# Patient Record
Sex: FEMALE | Race: WHITE | Employment: PART TIME
[De-identification: names, ages, dates, MRNs, and addresses within clinical notes are randomized per-mention and may not be internally consistent; named-entity substitution may affect disease eponyms.]

---

## 2017-01-09 ENCOUNTER — OFFICE VISIT (OUTPATIENT)
Dept: FAMILY MEDICINE CLINIC | Facility: CLINIC | Age: 62
End: 2017-01-09

## 2017-01-09 VITALS
TEMPERATURE: 98.1 F | HEART RATE: 97 BPM | WEIGHT: 261.25 LBS | BODY MASS INDEX: 39.59 KG/M2 | SYSTOLIC BLOOD PRESSURE: 156 MMHG | HEIGHT: 68 IN | DIASTOLIC BLOOD PRESSURE: 93 MMHG | OXYGEN SATURATION: 98 %

## 2017-01-09 DIAGNOSIS — E78.2 MIXED HYPERLIPIDEMIA: Primary | ICD-10-CM

## 2017-01-09 DIAGNOSIS — I10 ESSENTIAL HYPERTENSION: ICD-10-CM

## 2017-01-09 DIAGNOSIS — Z53.20 MEDICATION THERAPY MANAGEMENT RECOMMENDATION REFUSED BY PATIENT: ICD-10-CM

## 2017-01-09 PROCEDURE — 99214 OFFICE O/P EST MOD 30 MIN: CPT | Performed by: NURSE PRACTITIONER

## 2017-01-09 RX ORDER — SPIRONOLACTONE AND HYDROCHLOROTHIAZIDE 25; 25 MG/1; MG/1
1 TABLET ORAL DAILY
Qty: 30 TABLET | Refills: 5 | Status: SHIPPED | OUTPATIENT
Start: 2017-01-09 | End: 2017-07-10 | Stop reason: SDUPTHER

## 2017-01-09 ASSESSMENT — ENCOUNTER SYMPTOMS
DIARRHEA: 0
BLOOD IN STOOL: 0
COUGH: 0
EYE DISCHARGE: 0
SINUS PRESSURE: 0
CHEST TIGHTNESS: 0
RHINORRHEA: 0
ABDOMINAL PAIN: 0
CONSTIPATION: 0
SHORTNESS OF BREATH: 0

## 2017-01-09 ASSESSMENT — PATIENT HEALTH QUESTIONNAIRE - PHQ9
1. LITTLE INTEREST OR PLEASURE IN DOING THINGS: 0
SUM OF ALL RESPONSES TO PHQ9 QUESTIONS 1 & 2: 0
SUM OF ALL RESPONSES TO PHQ QUESTIONS 1-9: 0
2. FEELING DOWN, DEPRESSED OR HOPELESS: 0

## 2017-04-03 ENCOUNTER — OFFICE VISIT (OUTPATIENT)
Dept: FAMILY MEDICINE CLINIC | Age: 62
End: 2017-04-03

## 2017-04-03 VITALS
DIASTOLIC BLOOD PRESSURE: 78 MMHG | RESPIRATION RATE: 20 BRPM | BODY MASS INDEX: 39.56 KG/M2 | HEART RATE: 114 BPM | WEIGHT: 261 LBS | TEMPERATURE: 97.9 F | HEIGHT: 68 IN | OXYGEN SATURATION: 95 % | SYSTOLIC BLOOD PRESSURE: 140 MMHG

## 2017-04-03 DIAGNOSIS — J40 BRONCHITIS: Primary | ICD-10-CM

## 2017-04-03 PROCEDURE — 99214 OFFICE O/P EST MOD 30 MIN: CPT | Performed by: NURSE PRACTITIONER

## 2017-04-03 RX ORDER — PREDNISONE 20 MG/1
20 TABLET ORAL 2 TIMES DAILY
Qty: 10 TABLET | Refills: 0 | Status: SHIPPED | OUTPATIENT
Start: 2017-04-03 | End: 2017-04-08

## 2017-04-03 RX ORDER — AZITHROMYCIN 250 MG/1
TABLET, FILM COATED ORAL
Qty: 1 PACKET | Refills: 0 | Status: SHIPPED | OUTPATIENT
Start: 2017-04-03 | End: 2017-07-10 | Stop reason: ALTCHOICE

## 2017-04-03 ASSESSMENT — ENCOUNTER SYMPTOMS
EYE DISCHARGE: 0
SINUS PRESSURE: 0
BLOOD IN STOOL: 0
CONSTIPATION: 0
RHINORRHEA: 0
SHORTNESS OF BREATH: 0
CHEST TIGHTNESS: 0
ABDOMINAL PAIN: 0
COUGH: 1
DIARRHEA: 0

## 2017-06-12 DIAGNOSIS — M54.41 ACUTE BILATERAL LOW BACK PAIN WITH BILATERAL SCIATICA: ICD-10-CM

## 2017-06-12 DIAGNOSIS — M54.42 ACUTE BILATERAL LOW BACK PAIN WITH BILATERAL SCIATICA: ICD-10-CM

## 2017-06-13 RX ORDER — TIZANIDINE 4 MG/1
TABLET ORAL
Qty: 180 TABLET | Refills: 1 | Status: SHIPPED | OUTPATIENT
Start: 2017-06-13 | End: 2017-07-10 | Stop reason: SDUPTHER

## 2017-07-10 ENCOUNTER — OFFICE VISIT (OUTPATIENT)
Dept: FAMILY MEDICINE CLINIC | Age: 62
End: 2017-07-10

## 2017-07-10 VITALS
WEIGHT: 262 LBS | SYSTOLIC BLOOD PRESSURE: 152 MMHG | DIASTOLIC BLOOD PRESSURE: 86 MMHG | HEART RATE: 88 BPM | RESPIRATION RATE: 18 BRPM | HEIGHT: 68 IN | OXYGEN SATURATION: 97 % | TEMPERATURE: 97.4 F | BODY MASS INDEX: 39.71 KG/M2

## 2017-07-10 DIAGNOSIS — Z76.0 MEDICATION REFILL: ICD-10-CM

## 2017-07-10 DIAGNOSIS — M54.42 ACUTE BILATERAL LOW BACK PAIN WITH BILATERAL SCIATICA: ICD-10-CM

## 2017-07-10 DIAGNOSIS — M54.41 ACUTE BILATERAL LOW BACK PAIN WITH BILATERAL SCIATICA: ICD-10-CM

## 2017-07-10 DIAGNOSIS — I10 ESSENTIAL HYPERTENSION: Primary | ICD-10-CM

## 2017-07-10 DIAGNOSIS — Z53.20 MEDICATION THERAPY MANAGEMENT RECOMMENDATION REFUSED BY PATIENT: ICD-10-CM

## 2017-07-10 PROCEDURE — 99213 OFFICE O/P EST LOW 20 MIN: CPT | Performed by: NURSE PRACTITIONER

## 2017-07-10 RX ORDER — TIZANIDINE 4 MG/1
TABLET ORAL
Qty: 60 TABLET | Refills: 5 | Status: SHIPPED | OUTPATIENT
Start: 2017-07-10 | End: 2018-01-16

## 2017-07-10 RX ORDER — DICYCLOMINE HYDROCHLORIDE 10 MG/1
10 CAPSULE ORAL DAILY PRN
Qty: 60 CAPSULE | Refills: 5 | Status: SHIPPED | OUTPATIENT
Start: 2017-07-10 | End: 2018-02-22 | Stop reason: SDUPTHER

## 2017-07-10 RX ORDER — DICYCLOMINE HYDROCHLORIDE 10 MG/1
10 CAPSULE ORAL 2 TIMES DAILY PRN
COMMUNITY
End: 2017-07-10 | Stop reason: SDUPTHER

## 2017-07-10 RX ORDER — SPIRONOLACTONE AND HYDROCHLOROTHIAZIDE 25; 25 MG/1; MG/1
1 TABLET ORAL DAILY
Qty: 30 TABLET | Refills: 5 | Status: SHIPPED | OUTPATIENT
Start: 2017-07-10 | End: 2018-01-13 | Stop reason: SDUPTHER

## 2017-07-10 ASSESSMENT — ENCOUNTER SYMPTOMS
SHORTNESS OF BREATH: 0
BACK PAIN: 1

## 2018-01-13 DIAGNOSIS — I10 ESSENTIAL HYPERTENSION: ICD-10-CM

## 2018-01-13 DIAGNOSIS — M54.42 ACUTE BILATERAL LOW BACK PAIN WITH BILATERAL SCIATICA: ICD-10-CM

## 2018-01-13 DIAGNOSIS — M54.41 ACUTE BILATERAL LOW BACK PAIN WITH BILATERAL SCIATICA: ICD-10-CM

## 2018-01-16 RX ORDER — SPIRONOLACTONE AND HYDROCHLOROTHIAZIDE 25; 25 MG/1; MG/1
TABLET ORAL
Qty: 30 TABLET | Refills: 5 | Status: SHIPPED | OUTPATIENT
Start: 2018-01-16 | End: 2018-07-20 | Stop reason: SDUPTHER

## 2018-01-16 RX ORDER — TIZANIDINE 4 MG/1
TABLET ORAL
Qty: 180 TABLET | Refills: 1 | Status: SHIPPED | OUTPATIENT
Start: 2018-01-16 | End: 2018-02-22 | Stop reason: SDUPTHER

## 2018-02-22 ENCOUNTER — OFFICE VISIT (OUTPATIENT)
Dept: FAMILY MEDICINE CLINIC | Age: 63
End: 2018-02-22

## 2018-02-22 VITALS
BODY MASS INDEX: 38.49 KG/M2 | SYSTOLIC BLOOD PRESSURE: 138 MMHG | HEIGHT: 68 IN | DIASTOLIC BLOOD PRESSURE: 80 MMHG | HEART RATE: 89 BPM | RESPIRATION RATE: 20 BRPM | TEMPERATURE: 97.2 F | OXYGEN SATURATION: 99 % | WEIGHT: 254 LBS

## 2018-02-22 DIAGNOSIS — I10 ESSENTIAL HYPERTENSION: Primary | ICD-10-CM

## 2018-02-22 DIAGNOSIS — M54.42 ACUTE BILATERAL LOW BACK PAIN WITH BILATERAL SCIATICA: ICD-10-CM

## 2018-02-22 DIAGNOSIS — M54.41 ACUTE BILATERAL LOW BACK PAIN WITH BILATERAL SCIATICA: ICD-10-CM

## 2018-02-22 DIAGNOSIS — Z76.0 MEDICATION REFILL: ICD-10-CM

## 2018-02-22 PROCEDURE — 99213 OFFICE O/P EST LOW 20 MIN: CPT | Performed by: NURSE PRACTITIONER

## 2018-02-22 RX ORDER — TIZANIDINE 4 MG/1
4 TABLET ORAL EVERY 8 HOURS PRN
Qty: 180 TABLET | Refills: 1 | Status: SHIPPED | OUTPATIENT
Start: 2018-02-22 | End: 2018-08-22 | Stop reason: SDUPTHER

## 2018-02-22 RX ORDER — DICYCLOMINE HYDROCHLORIDE 10 MG/1
10 CAPSULE ORAL DAILY PRN
Qty: 60 CAPSULE | Refills: 5 | Status: SHIPPED | OUTPATIENT
Start: 2018-02-22 | End: 2018-08-22 | Stop reason: SDUPTHER

## 2018-02-22 ASSESSMENT — PATIENT HEALTH QUESTIONNAIRE - PHQ9
SUM OF ALL RESPONSES TO PHQ9 QUESTIONS 1 & 2: 0
SUM OF ALL RESPONSES TO PHQ QUESTIONS 1-9: 0
2. FEELING DOWN, DEPRESSED OR HOPELESS: 0
1. LITTLE INTEREST OR PLEASURE IN DOING THINGS: 0

## 2018-02-22 ASSESSMENT — ENCOUNTER SYMPTOMS
BACK PAIN: 1
SHORTNESS OF BREATH: 0
COUGH: 0

## 2018-02-22 NOTE — PROGRESS NOTES
Past Medical History:   Diagnosis Date    Diverticula, colon     Hyperlipidemia     Hypertension       Past Surgical History:   Procedure Laterality Date    CHOLECYSTECTOMY, OPEN  12    HIP SURGERY Right 2007    HYSTERECTOMY      JOINT REPLACEMENT Bilateral 2009    LAMINECTOMY  2013     Family History   Problem Relation Age of Onset    Diabetes Mother     Heart Failure Mother     Diabetes Father     Kidney Disease Father      Social History   Substance Use Topics    Smoking status: Never Smoker    Smokeless tobacco: Never Used    Alcohol use No      Allergies   Allergen Reactions    Morphine Other (See Comments)     Not sure of reaction just told in the hospital never to take it again       Subjective:      Review of Systems   Constitutional: Negative for chills and fever. Respiratory: Negative for cough and shortness of breath. Cardiovascular: Positive for leg swelling (minimal in feet at the end of the day, resolves by am). Negative for chest pain and palpitations. Musculoskeletal: Positive for back pain. Other pertinent ROS in HPI  Objective:     /80   Pulse 89   Temp 97.2 °F (36.2 °C) (Oral)   Resp 20   Ht 5' 8.11\" (1.73 m)   Wt 254 lb (115.2 kg)   SpO2 99%   BMI 38.50 kg/m²    Physical Exam   Constitutional: She is oriented to person, place, and time. She appears well-developed and well-nourished. No distress. HENT:   Head: Normocephalic and atraumatic. Right Ear: External ear normal.   Left Ear: External ear normal.   Nose: Nose normal.   Mouth/Throat: Oropharynx is clear and moist.   Eyes: Conjunctivae and EOM are normal. Pupils are equal, round, and reactive to light. Neck: Trachea normal, normal range of motion and full passive range of motion without pain. No thyroid mass present. Cardiovascular: Normal rate, regular rhythm, S1 normal, S2 normal and normal heart sounds. Exam reveals no distant heart sounds and no friction rub.     Pulmonary/Chest:

## 2018-07-20 DIAGNOSIS — I10 ESSENTIAL HYPERTENSION: ICD-10-CM

## 2018-07-23 RX ORDER — SPIRONOLACTONE AND HYDROCHLOROTHIAZIDE 25; 25 MG/1; MG/1
TABLET ORAL
Qty: 30 TABLET | Refills: 5 | Status: SHIPPED | OUTPATIENT
Start: 2018-07-23 | End: 2019-01-07 | Stop reason: SDUPTHER

## 2018-07-23 NOTE — TELEPHONE ENCOUNTER
Last seen 2/22/18  Next appt 8/22/18    Next Visit Date:  Future Appointments  Date Time Provider Per Mora   8/22/2018 11:00 AM Edith Cancer, APRN - CNP [facility] [address] Maintenance   Topic Date Due    Potassium monitoring  1955    Creatinine monitoring  1955    Hepatitis C screen  1955    HIV screen  09/19/1969    DTaP/Tdap/Td vaccine (1 - Tdap) 09/19/1973    Cervical cancer screen  09/19/1975    Diabetes screen  09/19/1994    Shingles Vaccine (1 of 2 - 2 Dose Series) 09/19/2004    Colon cancer screen colonoscopy  09/19/2004    Breast cancer screen  06/12/2015    Flu vaccine (1) 09/01/2018    Lipid screen  12/07/2021       No results found for: LABA1C          ( goal A1C is < 7)   No results found for: LABMICR  No results found for: LDLCHOLESTEROL, LDLCALC    (goal LDL is <100)   No results found for: AST, ALT, BUN  BP Readings from Last 3 Encounters:   02/22/18 138/80   07/10/17 (!) 152/86   04/03/17 (!) 140/78          (goal 120/80)    All Future Testing planned in [facility]  Lab Frequency Next Occurrence               Patient Active Problem List:     Essential hypertension     Acute bilateral low back pain with bilateral sciatica     Medication therapy management recommendation refused by patient     Mixed hyperlipidemia

## 2018-08-22 ENCOUNTER — OFFICE VISIT (OUTPATIENT)
Dept: FAMILY MEDICINE CLINIC | Age: 63
End: 2018-08-22

## 2018-08-22 VITALS
HEART RATE: 94 BPM | SYSTOLIC BLOOD PRESSURE: 120 MMHG | TEMPERATURE: 97.3 F | OXYGEN SATURATION: 98 % | BODY MASS INDEX: 37.37 KG/M2 | WEIGHT: 246.6 LBS | DIASTOLIC BLOOD PRESSURE: 78 MMHG

## 2018-08-22 DIAGNOSIS — G89.29 CHRONIC BILATERAL LOW BACK PAIN WITHOUT SCIATICA: ICD-10-CM

## 2018-08-22 DIAGNOSIS — M54.41 ACUTE BILATERAL LOW BACK PAIN WITH BILATERAL SCIATICA: ICD-10-CM

## 2018-08-22 DIAGNOSIS — M54.50 CHRONIC BILATERAL LOW BACK PAIN WITHOUT SCIATICA: ICD-10-CM

## 2018-08-22 DIAGNOSIS — Z13.1 SCREENING FOR DIABETES MELLITUS: ICD-10-CM

## 2018-08-22 DIAGNOSIS — M54.42 ACUTE BILATERAL LOW BACK PAIN WITH BILATERAL SCIATICA: ICD-10-CM

## 2018-08-22 DIAGNOSIS — Z76.0 MEDICATION REFILL: ICD-10-CM

## 2018-08-22 DIAGNOSIS — Z13.220 SCREENING, LIPID: ICD-10-CM

## 2018-08-22 DIAGNOSIS — I10 ESSENTIAL HYPERTENSION: Primary | ICD-10-CM

## 2018-08-22 LAB — HBA1C MFR BLD: 5.4 %

## 2018-08-22 PROCEDURE — 83036 HEMOGLOBIN GLYCOSYLATED A1C: CPT | Performed by: NURSE PRACTITIONER

## 2018-08-22 PROCEDURE — 99213 OFFICE O/P EST LOW 20 MIN: CPT | Performed by: NURSE PRACTITIONER

## 2018-08-22 RX ORDER — DICYCLOMINE HYDROCHLORIDE 10 MG/1
10 CAPSULE ORAL DAILY PRN
Qty: 60 CAPSULE | Refills: 5 | Status: SHIPPED | OUTPATIENT
Start: 2018-08-22 | End: 2021-04-23 | Stop reason: SDUPTHER

## 2018-08-22 RX ORDER — AMLODIPINE BESYLATE 5 MG/1
5 TABLET ORAL DAILY
Qty: 30 TABLET | Refills: 5 | Status: SHIPPED | OUTPATIENT
Start: 2018-08-22 | End: 2019-02-07 | Stop reason: SDUPTHER

## 2018-08-22 RX ORDER — MELOXICAM 7.5 MG/1
7.5 TABLET ORAL DAILY
Qty: 30 TABLET | Refills: 3 | Status: SHIPPED | OUTPATIENT
Start: 2018-08-22 | End: 2018-12-03 | Stop reason: SDUPTHER

## 2018-08-22 RX ORDER — TIZANIDINE 4 MG/1
4 TABLET ORAL EVERY 8 HOURS PRN
Qty: 180 TABLET | Refills: 1 | Status: SHIPPED | OUTPATIENT
Start: 2018-08-22 | End: 2018-12-30 | Stop reason: SDUPTHER

## 2018-08-22 NOTE — PROGRESS NOTES
Patient is present for 6 month f/u for HTN  Patient states her BP has been running high    Patient would like to discuss taking meloxicam  Patient states she is taking Advil 600mg daily for her arthritis but would like to try meloxicam    Patient is due for labs    Patient is due for mammogram, pap, and colonoscopy    Pharmacy and medication reviewed with patient    Visit Information    Have you changed or started any medications since your last visit including any over-the-counter medicines, vitamins, or herbal medicines? no   Have you stopped taking any of your medications? Is so, why? -  no  Are you having any side effects from any of your medications? - no    Have you seen any other physician or provider since your last visit?  no   Have you had any other diagnostic tests since your last visit?  no   Have you been seen in the emergency room and/or had an admission in a hospital since we last saw you?  no   Have you had your routine dental cleaning in the past 6 months?  no     Do you have an active MyChart account? If no, what is the barrier?   Yes    Patient Care Team:  RENAN Israel - CNP as PCP - General (Certified Nurse Practitioner)    Medical History Review  Past Medical, Family, and Social History reviewed and does contribute to the patient presenting condition    Health Maintenance   Topic Date Due    Potassium monitoring  1955    Creatinine monitoring  1955    Hepatitis C screen  1955    HIV screen  09/19/1969    DTaP/Tdap/Td vaccine (1 - Tdap) 09/19/1973    Cervical cancer screen  09/19/1975    Diabetes screen  09/19/1994    Shingles Vaccine (1 of 2 - 2 Dose Series) 09/19/2004    Colon cancer screen colonoscopy  09/19/2004    Breast cancer screen  06/12/2015    Flu vaccine (1) 09/01/2018    Lipid screen  12/07/2021

## 2018-08-22 NOTE — PROGRESS NOTES
[address] [place]  [phone]    Ricardo Reeves is a 58 y.o. female who presents today for her  medical conditions/complaints as noted below. Ricardo Reeves is c/o of 6 Month Follow-Up and Hypertension  . HPI:     HPI   Hypertension: Patient here for follow-up of elevated blood pressure. She is exercising (as much as her back pain will let her) and is not adherent to low salt diet. Blood pressure is well controlled at home she reports. Cardiac symptoms none. Patient denies chest pain, chest pressure/discomfort, exertional chest pressure/discomfort, irregular heart beat, lower extremity edema and palpitations. Cardiovascular risk factors: hypertension, obesity (BMI >= 30 kg/m2) and sedentary lifestyle. Use of agents associated with hypertension: none. History of target organ damage: none. Discussed with her that I am concerned about her blood pressure. She states that it is well controlled at home and she is not concerned. She will not get lab work done, she states she will when she has insurance in December. Would like to try meloxicam. She took celebrex for many years but could not afford when she lost her insurance. She is taking ibuprofen regularly. Nursing note reviewed and discussed with patient. Patient's medications, allergies, past medical, surgical, social and family histories were reviewed and updated as appropriate. Current Outpatient Prescriptions on File Prior to Visit   Medication Sig Dispense Refill    spironolactone-hydrochlorothiazide (ALDACTAZIDE) 25-25 MG per tablet take 1 tablet by mouth once daily 30 tablet 5     No current facility-administered medications on file prior to visit.       Past Medical History:   Diagnosis Date    Diverticula, colon     Hyperlipidemia     Hypertension       Past Surgical History:   Procedure Laterality Date    CHOLECYSTECTOMY, OPEN  [place]    HIP SURGERY Right 2007    HYSTERECTOMY      JOINT REPLACEMENT Bilateral 2009    LAMINECTOMY  2013     Family History   Problem Relation Age of Onset    Diabetes Mother     Heart Failure Mother     Diabetes Father     Kidney Disease Father      Social History   Substance Use Topics    Smoking status: Never Smoker    Smokeless tobacco: Never Used    Alcohol use No      Allergies   Allergen Reactions    Morphine Other (See Comments)     Not sure of reaction just told in the hospital never to take it again       Subjective:      Review of Systems   Constitutional: Negative for chills and fever. Respiratory: Negative for cough and shortness of breath. Cardiovascular: Negative for chest pain, palpitations and leg swelling. Other pertinent ROS in HPI  Objective:     /78   Pulse 94   Temp 97.3 °F (36.3 °C) (Oral)   Wt 246 lb 9.6 oz (111.9 kg)   SpO2 98%   BMI 37.37 kg/m²      Wt Readings from Last 3 Encounters:   09/05/18 244 lb (110.7 kg)   08/22/18 246 lb 9.6 oz (111.9 kg)   02/22/18 254 lb (115.2 kg)       Physical Exam   Constitutional: She is oriented to person, place, and time. She appears well-developed and well-nourished. Neck: Normal range of motion. Cardiovascular: Normal rate, regular rhythm and normal heart sounds. Pulmonary/Chest: Effort normal and breath sounds normal.   Neurological: She is alert and oriented to person, place, and time. Psychiatric: She has a normal mood and affect. Assessment/PLAN     1. Essential hypertension  *stable  Continue meds  - Comprehensive Metabolic Panel; Future  - CBC; Future    2. Screening for diabetes mellitus  Discussed diet and exercise  - POCT glycosylated hemoglobin (Hb A1C)    3. Chronic bilateral low back pain without sciatica    - meloxicam (MOBIC) 7.5 MG tablet; Take 1 tablet by mouth daily  Dispense: 30 tablet; Refill: 3    4. Screening, lipid    - Lipid Panel; Future    5.  Acute bilateral low back pain with bilateral sciatica    - tiZANidine (ZANAFLEX) 4 MG tablet; Take 1 tablet by mouth every 8 hours as needed (back spasms)  Dispense: 180 tablet; Refill: 1    6. Medication refill    - dicyclomine (BENTYL) 10 MG capsule; Take 1 capsule by mouth daily as needed (spasms)  Dispense: 60 capsule; Refill: 5      RTO if symptoms worsen or fail to improve  Pt agreeable with plan      Patient given educational materials - see patient instructions. Discussed use, benefit, and side effects of prescribed medications. All patient questions answered. Pt voiced understanding. Reviewed health maintenance. Instructed to continue current medications, diet and exercise. 1.  Nat Long received counseling on the following healthy behaviors: nutrition, exercise and medication adherence  2. Patient given educational materials when available - see patient instructions when applicable  3. Discussed use, benefit, and side effects of prescribed medications. Barriers to medication compliance addressed. All patient questions answered. Pt voiced understanding. 4.  Reviewed prior labs and health maintenance  5. Continue current medications, diet and exercise.     Completed Refills   Requested Prescriptions     Signed Prescriptions Disp Refills    meloxicam (MOBIC) 7.5 MG tablet 30 tablet 3     Sig: Take 1 tablet by mouth daily    tiZANidine (ZANAFLEX) 4 MG tablet 180 tablet 1     Sig: Take 1 tablet by mouth every 8 hours as needed (back spasms)    dicyclomine (BENTYL) 10 MG capsule 60 capsule 5     Sig: Take 1 capsule by mouth daily as needed (spasms)    amLODIPine (NORVASC) 5 MG tablet 30 tablet 5     Sig: Take 1 tablet by mouth daily         Electronically signed by Marrion Runner, CNP on 9/21/2018 at 10:40 AM

## 2018-08-23 ASSESSMENT — ENCOUNTER SYMPTOMS
COUGH: 0
SHORTNESS OF BREATH: 0

## 2018-09-05 ENCOUNTER — OFFICE VISIT (OUTPATIENT)
Dept: FAMILY MEDICINE CLINIC | Age: 63
End: 2018-09-05

## 2018-09-05 VITALS
RESPIRATION RATE: 16 BRPM | DIASTOLIC BLOOD PRESSURE: 74 MMHG | SYSTOLIC BLOOD PRESSURE: 124 MMHG | TEMPERATURE: 98.4 F | HEART RATE: 83 BPM | WEIGHT: 244 LBS | OXYGEN SATURATION: 96 % | BODY MASS INDEX: 36.98 KG/M2

## 2018-09-05 DIAGNOSIS — B96.89 ACUTE BACTERIAL SINUSITIS: Primary | ICD-10-CM

## 2018-09-05 DIAGNOSIS — J01.90 ACUTE BACTERIAL SINUSITIS: Primary | ICD-10-CM

## 2018-09-05 PROCEDURE — 99213 OFFICE O/P EST LOW 20 MIN: CPT | Performed by: INTERNAL MEDICINE

## 2018-09-05 RX ORDER — AMOXICILLIN AND CLAVULANATE POTASSIUM 875; 125 MG/1; MG/1
1 TABLET, FILM COATED ORAL 2 TIMES DAILY
Qty: 14 TABLET | Refills: 0 | Status: SHIPPED | OUTPATIENT
Start: 2018-09-05 | End: 2018-09-12

## 2018-09-05 RX ORDER — TRIAMCINOLONE ACETONIDE 55 UG/1
2 SPRAY, METERED NASAL DAILY
Qty: 1 INHALER | Refills: 3 | Status: SHIPPED | OUTPATIENT
Start: 2018-09-05 | End: 2019-05-20

## 2018-09-05 NOTE — PROGRESS NOTES
Patient is present for a possible sinus infection that onset about 3-4 days ago. When she woke up this AM she felt like \"her head was going to pop off\". She feels drainage down the back of her throat. Pt took tylenol this AM. Patient has pressure in her face. Visit Information    Have you changed or started any medications since your last visit including any over-the-counter medicines, vitamins, or herbal medicines? no   Have you stopped taking any of your medications? Is so, why? -  no  Are you having any side effects from any of your medications? - no    Have you seen any other physician or provider since your last visit?  no   Have you had any other diagnostic tests since your last visit?  no   Have you been seen in the emergency room and/or had an admission in a hospital since we last saw you?  no   Have you had your routine dental cleaning in the past 6 months?  no     Do you have an active MyChart account? If no, what is the barrier?   Yes    Patient Care Team:  RENAN Marte - CNP as PCP - General (Certified Nurse Practitioner)    Medical History Review  Past Medical, Family, and Social History reviewed and does not contribute to the patient presenting condition    Health Maintenance   Topic Date Due    Flu vaccine (1) 09/01/2018    Breast cancer screen  02/22/2019 (Originally 6/12/2015)    Cervical cancer screen  02/22/2019 (Originally 9/19/1975)    Potassium monitoring  02/22/2019 (Originally 1955)    Creatinine monitoring  02/22/2019 (Originally 1955)    Hepatitis C screen  02/22/2019 (Originally 1955)    HIV screen  02/22/2019 (Originally 9/19/1969)    Colon cancer screen colonoscopy  02/22/2019 (Originally 9/19/2004)    DTaP/Tdap/Td vaccine (1 - Tdap) 08/22/2019 (Originally 9/19/1973)    Shingles Vaccine (1 of 2 - 2 Dose Series) 08/22/2019 (Originally 9/19/2004)    Diabetes screen  08/22/2021    Lipid screen  12/07/2021

## 2018-09-17 ENCOUNTER — TELEPHONE (OUTPATIENT)
Dept: FAMILY MEDICINE CLINIC | Age: 63
End: 2018-09-17

## 2018-09-17 RX ORDER — LOPERAMIDE HYDROCHLORIDE 2 MG/1
2 CAPSULE ORAL 3 TIMES DAILY PRN
Qty: 60 CAPSULE | Refills: 0 | Status: SHIPPED | OUTPATIENT
Start: 2018-09-17 | End: 2018-09-27

## 2018-09-17 NOTE — TELEPHONE ENCOUNTER
Pt called again concerned about having diarrhea. Pt was hoping to have some resolution before tomorrow when she has to work. Please advise.

## 2018-09-17 NOTE — TELEPHONE ENCOUNTER
5-10-15 times/day  since last week. was on antibiotic 9/5/18 for sinus, sinus are better. stools are totally liquid. asking for anti diarrhea med to try.

## 2018-12-03 DIAGNOSIS — M54.50 CHRONIC BILATERAL LOW BACK PAIN WITHOUT SCIATICA: ICD-10-CM

## 2018-12-03 DIAGNOSIS — G89.29 CHRONIC BILATERAL LOW BACK PAIN WITHOUT SCIATICA: ICD-10-CM

## 2018-12-03 RX ORDER — MELOXICAM 7.5 MG/1
TABLET ORAL
Qty: 90 TABLET | Refills: 1 | Status: SHIPPED | OUTPATIENT
Start: 2018-12-03 | End: 2019-05-28 | Stop reason: SDUPTHER

## 2018-12-04 ENCOUNTER — TELEPHONE (OUTPATIENT)
Dept: FAMILY MEDICINE CLINIC | Age: 63
End: 2018-12-04

## 2018-12-04 DIAGNOSIS — M54.42 ACUTE BILATERAL LOW BACK PAIN WITH BILATERAL SCIATICA: Primary | ICD-10-CM

## 2018-12-04 DIAGNOSIS — M54.41 ACUTE BILATERAL LOW BACK PAIN WITH BILATERAL SCIATICA: Primary | ICD-10-CM

## 2018-12-04 NOTE — TELEPHONE ENCOUNTER
Patient called and is requesting a new referral for pain management. It's been over two years since she's been seen and the office of Dr. Stephen Ogden at [facility] would like a new referral. Please contact patient at earliest convenience. Thank you.

## 2018-12-30 DIAGNOSIS — M54.41 ACUTE BILATERAL LOW BACK PAIN WITH BILATERAL SCIATICA: ICD-10-CM

## 2018-12-30 DIAGNOSIS — M54.42 ACUTE BILATERAL LOW BACK PAIN WITH BILATERAL SCIATICA: ICD-10-CM

## 2019-01-02 RX ORDER — TIZANIDINE 4 MG/1
TABLET ORAL
Qty: 180 TABLET | Refills: 1 | Status: SHIPPED | OUTPATIENT
Start: 2019-01-02 | End: 2019-05-01 | Stop reason: SDUPTHER

## 2019-01-07 DIAGNOSIS — I10 ESSENTIAL HYPERTENSION: ICD-10-CM

## 2019-01-09 RX ORDER — SPIRONOLACTONE AND HYDROCHLOROTHIAZIDE 25; 25 MG/1; MG/1
TABLET ORAL
Qty: 30 TABLET | Refills: 3 | Status: SHIPPED | OUTPATIENT
Start: 2019-01-09 | End: 2019-05-12 | Stop reason: SDUPTHER

## 2019-02-07 RX ORDER — AMLODIPINE BESYLATE 5 MG/1
TABLET ORAL
Qty: 30 TABLET | Refills: 5 | Status: SHIPPED | OUTPATIENT
Start: 2019-02-07 | End: 2019-05-20

## 2019-04-03 LAB
ALBUMIN SERPL-MCNC: 4.2 G/DL
ALP BLD-CCNC: 67 U/L
ALT SERPL-CCNC: NORMAL U/L
ANION GAP SERPL CALCULATED.3IONS-SCNC: NORMAL MMOL/L
AST SERPL-CCNC: NORMAL U/L
BASOPHILS ABSOLUTE: 39 /ΜL
BASOPHILS RELATIVE PERCENT: 0.6 %
BILIRUB SERPL-MCNC: 0.6 MG/DL (ref 0.1–1.4)
BUN BLDV-MCNC: 15 MG/DL
CALCIUM SERPL-MCNC: 10 MG/DL
CHLORIDE BLD-SCNC: 104 MMOL/L
CHOLESTEROL, TOTAL: 280 MG/DL
CHOLESTEROL/HDL RATIO: 5.4
CO2: 28 MMOL/L
CREAT SERPL-MCNC: 0.7 MG/DL
EOSINOPHILS ABSOLUTE: 91 /ΜL
EOSINOPHILS RELATIVE PERCENT: 1.4 %
GFR CALCULATED: 92
GLUCOSE BLD-MCNC: 90 MG/DL
HCT VFR BLD CALC: 42.8 % (ref 36–46)
HDLC SERPL-MCNC: 52 MG/DL (ref 35–70)
HEMOGLOBIN: 14.7 G/DL (ref 12–16)
LDL CHOLESTEROL CALCULATED: 212 MG/DL (ref 0–160)
LYMPHOCYTES ABSOLUTE: 1372 /ΜL
LYMPHOCYTES RELATIVE PERCENT: 21.1 %
MCH RBC QN AUTO: 29.8 PG
MCHC RBC AUTO-ENTMCNC: 34.3 G/DL
MCV RBC AUTO: 86.6 FL
MONOCYTES ABSOLUTE: 520 /ΜL
MONOCYTES RELATIVE PERCENT: 8 %
NEUTROPHILS ABSOLUTE: 4479 /ΜL
NEUTROPHILS RELATIVE PERCENT: 68.9 %
PLATELET # BLD: 317 K/ΜL
PMV BLD AUTO: 12.5 FL
POTASSIUM SERPL-SCNC: 3.9 MMOL/L
RBC # BLD: 4.94 10^6/ΜL
SODIUM BLD-SCNC: 142 MMOL/L
TOTAL PROTEIN: 7
TRIGL SERPL-MCNC: 62 MG/DL
VLDLC SERPL CALC-MCNC: ABNORMAL MG/DL
WBC # BLD: 6.5 10^3/ML

## 2019-04-08 DIAGNOSIS — Z13.220 SCREENING, LIPID: ICD-10-CM

## 2019-04-08 DIAGNOSIS — I10 ESSENTIAL HYPERTENSION: ICD-10-CM

## 2019-05-01 DIAGNOSIS — M54.41 ACUTE BILATERAL LOW BACK PAIN WITH BILATERAL SCIATICA: ICD-10-CM

## 2019-05-01 DIAGNOSIS — M54.42 ACUTE BILATERAL LOW BACK PAIN WITH BILATERAL SCIATICA: ICD-10-CM

## 2019-05-01 NOTE — TELEPHONE ENCOUNTER
Last visit 8/22/19  Next Visit Date:  Future Appointments   Date Time Provider Per Mora   5/13/2019  1:30 PM RENAN Shelby - CNP [facility] [address] Maintenance   Topic Date Due    Hepatitis C screen  1955    HIV screen  09/19/1969    Cervical cancer screen  09/19/1975    Colon cancer screen colonoscopy  09/19/2004    Breast cancer screen  06/12/2015    DTaP/Tdap/Td vaccine (1 - Tdap) 08/22/2019 (Originally 9/19/1973)    Shingles Vaccine (1 of 2) 08/22/2019 (Originally 9/19/2004)    Flu vaccine (Season Ended) 09/01/2019    Potassium monitoring  04/03/2020    Creatinine monitoring  04/03/2020    Lipid screen  04/03/2024    Pneumococcal 0-64 years Vaccine  Aged Out       Hemoglobin A1C (%)   Date Value   08/22/2018 5.4             ( goal A1C is < 7)   No results found for: LABMICR  LDL Calculated (mg/dL)   Date Value   04/03/2019 212 (A)       (goal LDL is <100)   BUN (mg/dL)   Date Value   04/03/2019 15     BP Readings from Last 3 Encounters:   09/05/18 124/74   08/22/18 120/78   02/22/18 138/80          (goal 120/80)    All Future Testing planned in [facility]  Lab Frequency Next Occurrence               Patient Active Problem List:     Essential hypertension     Acute bilateral low back pain with bilateral sciatica     Medication therapy management recommendation refused by patient     Mixed hyperlipidemia

## 2019-05-03 RX ORDER — TIZANIDINE 4 MG/1
TABLET ORAL
Qty: 180 TABLET | Refills: 1 | Status: SHIPPED | OUTPATIENT
Start: 2019-05-03 | End: 2020-03-16 | Stop reason: SDUPTHER

## 2019-05-12 DIAGNOSIS — I10 ESSENTIAL HYPERTENSION: ICD-10-CM

## 2019-05-13 NOTE — TELEPHONE ENCOUNTER
Last visit: 9/5/2018  Last Med refill: 1/9/2019  Does patient have enough medication for 72 hours: No:     Patient needs appt     Next Visit Date:  Future Appointments   Date Time Provider Per Mora   5/20/2019  1:30 PM Jasper Mires, APRN - CNP [facility] [address] Maintenance   Topic Date Due    Hepatitis C screen  1955    HIV screen  09/19/1969    Cervical cancer screen  09/19/1975    Colon cancer screen colonoscopy  09/19/2004    Breast cancer screen  06/12/2015    DTaP/Tdap/Td vaccine (1 - Tdap) 08/22/2019 (Originally 9/19/1973)    Shingles Vaccine (1 of 2) 08/22/2019 (Originally 9/19/2004)    Flu vaccine (Season Ended) 09/01/2019    Potassium monitoring  04/03/2020    Creatinine monitoring  04/03/2020    Lipid screen  04/03/2024    Pneumococcal 0-64 years Vaccine  Aged Out       Hemoglobin A1C (%)   Date Value   08/22/2018 5.4             ( goal A1C is < 7)   No results found for: LABMICR  LDL Calculated (mg/dL)   Date Value   04/03/2019 212 (A)       (goal LDL is <100)   BUN (mg/dL)   Date Value   04/03/2019 15     BP Readings from Last 3 Encounters:   09/05/18 124/74   08/22/18 120/78   02/22/18 138/80          (goal 120/80)    All Future Testing planned in [facility]  Lab Frequency Next Occurrence               Patient Active Problem List:     Essential hypertension     Acute bilateral low back pain with bilateral sciatica     Medication therapy management recommendation refused by patient     Mixed hyperlipidemia

## 2019-05-14 RX ORDER — SPIRONOLACTONE AND HYDROCHLOROTHIAZIDE 25; 25 MG/1; MG/1
TABLET ORAL
Qty: 30 TABLET | Refills: 3 | Status: SHIPPED | OUTPATIENT
Start: 2019-05-14 | End: 2019-09-15 | Stop reason: SDUPTHER

## 2019-05-20 ENCOUNTER — OFFICE VISIT (OUTPATIENT)
Dept: FAMILY MEDICINE CLINIC | Age: 64
End: 2019-05-20

## 2019-05-20 VITALS
SYSTOLIC BLOOD PRESSURE: 160 MMHG | WEIGHT: 226.6 LBS | HEIGHT: 68 IN | DIASTOLIC BLOOD PRESSURE: 92 MMHG | HEART RATE: 67 BPM | BODY MASS INDEX: 34.34 KG/M2 | OXYGEN SATURATION: 97 %

## 2019-05-20 DIAGNOSIS — I10 ESSENTIAL HYPERTENSION: Primary | ICD-10-CM

## 2019-05-20 PROCEDURE — 99213 OFFICE O/P EST LOW 20 MIN: CPT | Performed by: NURSE PRACTITIONER

## 2019-05-20 RX ORDER — FLUTICASONE PROPIONATE 50 MCG
1 SPRAY, SUSPENSION (ML) NASAL DAILY
COMMUNITY
End: 2019-12-04

## 2019-05-20 ASSESSMENT — ENCOUNTER SYMPTOMS
SHORTNESS OF BREATH: 0
COUGH: 0

## 2019-05-20 ASSESSMENT — PATIENT HEALTH QUESTIONNAIRE - PHQ9
SUM OF ALL RESPONSES TO PHQ QUESTIONS 1-9: 0
2. FEELING DOWN, DEPRESSED OR HOPELESS: 0
SUM OF ALL RESPONSES TO PHQ QUESTIONS 1-9: 0
SUM OF ALL RESPONSES TO PHQ9 QUESTIONS 1 & 2: 0
1. LITTLE INTEREST OR PLEASURE IN DOING THINGS: 0

## 2019-05-20 NOTE — PROGRESS NOTES
[address] [place]  [phone]    Jostin Ziegler is a 58 y.o. female who presents today for her  medical conditions/complaints as noted below. Jostin Ziegler is c/o of Medication Refill and Hypertension    HPI:     HPI   Hypertension: Patient here for follow-up of elevated blood pressure. She is exercising and is adherent to low salt diet. Blood pressure is well controlled at home. Cardiac symptoms none. Patient denies chest pain, chest pressure/discomfort, exertional chest pressure/discomfort, irregular heart beat and lower extremity edema. Cardiovascular risk factors: hypertension and obesity (BMI >= 30 kg/m2). Use of agents associated with hypertension: none. History of target organ damage: none. Pt states she stopped taking her pills. She states her bp is fine at home. She states the amlodipine did nothing for her bp, her last bp in the office (on the amlodipine) was 124/74- today she is 160/92. We have discussed the side effects of uncontrolled htn many times. Wt Readings from Last 3 Encounters:   05/20/19 226 lb 9.6 oz (102.8 kg)   09/05/18 244 lb (110.7 kg)   08/22/18 246 lb 9.6 oz (111.9 kg)     Nursing note reviewed and discussed with patient. Patient's medications, allergies, past medical, surgical, social and family histories were reviewed and updated asappropriate.     Current Outpatient Medications   Medication Sig Dispense Refill    fluticasone (FLONASE) 50 MCG/ACT nasal spray 1 spray by Each Nare route daily      spironolactone-hydrochlorothiazide (ALDACTAZIDE) 25-25 MG per tablet take 1 tablet by mouth once daily 30 tablet 3    tiZANidine (ZANAFLEX) 4 MG tablet take 1 tablet by mouth every 8 hours if needed for BACK SPASMS 180 tablet 1    meloxicam (MOBIC) 7.5 MG tablet take 1 tablet by mouth once daily 90 tablet 1    dicyclomine (BENTYL) 10 MG capsule Take 1 capsule by mouth daily as needed (spasms) 60 capsule 5     No current facility-administered medications for this visit. Past Medical History:   Diagnosis Date    Diverticula, colon     Hyperlipidemia     Hypertension       Past Surgical History:   Procedure Laterality Date    CHOLECYSTECTOMY, OPEN  12    HIP SURGERY Right 2007    HYSTERECTOMY      JOINT REPLACEMENT Bilateral 2009    LAMINECTOMY  2013     Family History   Problem Relation Age of Onset    Diabetes Mother     Heart Failure Mother     Diabetes Father     Kidney Disease Father      Social History     Tobacco Use    Smoking status: Never Smoker    Smokeless tobacco: Never Used   Substance Use Topics    Alcohol use: No      Allergies   Allergen Reactions    Morphine Other (See Comments)     Not sure of reaction just told in the hospital never to take it again       Subjective:      Review of Systems   Constitutional: Negative for chills and fever. Respiratory: Negative for cough and shortness of breath. Cardiovascular: Negative for chest pain, palpitations and leg swelling. Other pertinent ROS in HPI  Objective:     BP (!) 160/92 (Site: Right Upper Arm, Position: Sitting)   Pulse 67   Ht 5' 8\" (1.727 m)   Wt 226 lb 9.6 oz (102.8 kg)   SpO2 97%   BMI 34.45 kg/m²    Physical Exam   Constitutional: She is oriented to person, place, and time. She appears well-developed and well-nourished. She is active. HENT:   Right Ear: External ear normal.   Left Ear: External ear normal.   Nose: Nose normal.   Eyes: EOM are normal.   Neck: Normal range of motion and full passive range of motion without pain. Cardiovascular: Normal rate, regular rhythm, S1 normal, S2 normal and normal heart sounds. Pulmonary/Chest: Effort normal and breath sounds normal. No respiratory distress. Musculoskeletal: Normal range of motion. She exhibits no deformity. Neurological: She is alert and oriented to person, place, and time. Skin: Skin is warm and dry.    Psychiatric: She has a normal mood and affect. Assessment/PLAN     1. Essential hypertension    discussed with jessica that it is ultimately her choice if she wants to take her bp meds or not, when she comes here and her bp is elevated I suggest that she takes them. RTO if symptoms worsen or fail to improve  Pt agreeable with plan      Patient given educational materials - see patientinstructions. Discussed use, benefit, and side effects of prescribed medications. All patient questions answered. Pt voiced understanding. Reviewed health maintenance. Instructed to continue current medications, diet andexercise. 1.  Juwan Reyes received counseling on the following healthy behaviors: nutrition, exercise and medication adherence  2. Patient given educationalmaterials when available - see patient instructions when applicable  3. Discussed use, benefit, and side effects of prescribed medications. Barriersto medication compliance addressed. All patient questions answered. Pt voiced understanding. 4.  Reviewed prior labs and health maintenance  5. Continue current medications, diet and exercise.     CompletedRefills   Requested Prescriptions      No prescriptions requested or ordered in this encounter         Electronically signed by Jeffery Krishnan CNP on 5/20/2019 at 2:00 PM

## 2019-05-28 DIAGNOSIS — G89.29 CHRONIC BILATERAL LOW BACK PAIN WITHOUT SCIATICA: ICD-10-CM

## 2019-05-28 DIAGNOSIS — M54.50 CHRONIC BILATERAL LOW BACK PAIN WITHOUT SCIATICA: ICD-10-CM

## 2019-05-28 RX ORDER — MELOXICAM 7.5 MG/1
TABLET ORAL
Qty: 90 TABLET | Refills: 1 | Status: SHIPPED | OUTPATIENT
Start: 2019-05-28 | End: 2019-12-04

## 2019-07-18 ENCOUNTER — OFFICE VISIT (OUTPATIENT)
Dept: FAMILY MEDICINE CLINIC | Age: 64
End: 2019-07-18

## 2019-07-18 VITALS
OXYGEN SATURATION: 98 % | WEIGHT: 230 LBS | HEART RATE: 86 BPM | SYSTOLIC BLOOD PRESSURE: 140 MMHG | DIASTOLIC BLOOD PRESSURE: 84 MMHG | HEIGHT: 68 IN | BODY MASS INDEX: 34.86 KG/M2

## 2019-07-18 DIAGNOSIS — I10 ESSENTIAL HYPERTENSION: ICD-10-CM

## 2019-07-18 DIAGNOSIS — L03.213 PRESEPTAL CELLULITIS OF LEFT UPPER EYELID: Primary | ICD-10-CM

## 2019-07-18 PROCEDURE — 99213 OFFICE O/P EST LOW 20 MIN: CPT | Performed by: INTERNAL MEDICINE

## 2019-07-18 RX ORDER — CEPHALEXIN 500 MG/1
500 CAPSULE ORAL 3 TIMES DAILY
Qty: 21 CAPSULE | Refills: 0 | Status: SHIPPED | OUTPATIENT
Start: 2019-07-18 | End: 2019-07-25

## 2019-07-18 ASSESSMENT — ENCOUNTER SYMPTOMS
VOMITING: 0
EYE REDNESS: 0
NAUSEA: 0
EYE ITCHING: 0
FOREIGN BODY SENSATION: 0
PHOTOPHOBIA: 0
DOUBLE VISION: 0
EYE DISCHARGE: 1
BLURRED VISION: 0

## 2019-07-18 NOTE — PATIENT INSTRUCTIONS
Patient Education        Cellulitis of the Eye: Care Instructions  Your Care Instructions    Cellulitis of the eye is an infection of the skin and tissues around the eye. It is also called preseptal cellulitis or periorbital cellulitis. It is usually caused by bacteria. This type of infection may happen after a sinus infection or a dental infection. It could also happen after an insect bite or an injury to the face. It most often occurs where there is a break in the skin. Cellulitis of the eye can be very serious. It's important to treat it right away. If you do, it usually goes away without lasting problems. Medicine and home treatment can help you get better. Follow-up care is a key part of your treatment and safety. Be sure to make and go to all appointments, and call your doctor if you are having problems. It's also a good idea to know your test results and keep a list of the medicines you take. How can you care for yourself at home? · Take your antibiotics as directed. Do not stop taking them just because you feel better. You need to take the full course of antibiotics. · Do not wear contact lenses unless your doctor tells you it is okay. · Put your head on pillows, and put a cool, damp cloth on your eye. This can reduce swelling and pain. · If your doctor recommends it, use a warm pack on your eye. · Keep the skin around your eye clean and dry. · Be safe with medicines. Read and follow all instructions on the label. ? If the doctor gave you a prescription medicine for pain, take it as prescribed. ? If you are not taking a prescription pain medicine, ask your doctor if you can take an over-the-counter medicine. To prevent cellulitis  · Wash your hands well after you use the bathroom and before and after you eat. · Do not rub or pick at the skin around your eyes. Cellulitis occurs most often where there is a break in the skin.   · If you get a cut, pimple, or insect bite near your eye, clean the area as soon as you can. This can help prevent an infection. ? Wash the area with cool water and a mild soap, such as [place]. ? Do not use rubbing alcohol, hydrogen peroxide, iodine, or Mercurochrome. These can harm the tissues and slow healing. · Call your doctor if you have a sinus infection with redness or swelling of your eyes. When should you call for help? Call your doctor now or seek immediate medical care if:    · You have new or worse signs of an eye infection, such as:  ? Pus or thick discharge coming from the eye.  ? Redness or swelling around the eye.  ? A fever.     · Your eye seems to be bulging out.     · You seem to be getting sicker.     · You have vision changes.    Watch closely for changes in your health, and be sure to contact your doctor if:    · You do not get better as expected. Where can you learn more? Go to https://[email or web address] [email or web address].[email or web address]. org and sign in to your [employer] account. Enter [phone] in the [employer] box to learn more about \"Cellulitis of the Eye: Care Instructions. \"     If you do not have an account, please click on the \"Sign Up Now\" link. Current as of: July 17, 2018  Content Version: 12.0  © 3617-9462 Healthwise, Incorporated. Care instructions adapted under license by [facility] ([facility]). If you have questions about a medical condition or this instruction, always ask your healthcare professional. [place] [address] any warranty or liability for your use of this information.

## 2019-09-15 DIAGNOSIS — I10 ESSENTIAL HYPERTENSION: ICD-10-CM

## 2019-09-19 RX ORDER — SPIRONOLACTONE AND HYDROCHLOROTHIAZIDE 25; 25 MG/1; MG/1
TABLET ORAL
Qty: 90 TABLET | Refills: 1 | Status: SHIPPED | OUTPATIENT
Start: 2019-09-19 | End: 2019-12-04 | Stop reason: SDUPTHER

## 2019-11-20 ENCOUNTER — OFFICE VISIT (OUTPATIENT)
Dept: ORTHOPEDIC SURGERY | Age: 64
End: 2019-11-20
Payer: MEDICARE

## 2019-11-20 VITALS — BODY MASS INDEX: 33.34 KG/M2 | WEIGHT: 220 LBS | HEIGHT: 68 IN

## 2019-11-20 DIAGNOSIS — M19.019 ARTHRITIS, SHOULDER REGION: Primary | ICD-10-CM

## 2019-11-20 PROCEDURE — 1090F PRES/ABSN URINE INCON ASSESS: CPT | Performed by: ORTHOPAEDIC SURGERY

## 2019-11-20 PROCEDURE — G8427 DOCREV CUR MEDS BY ELIG CLIN: HCPCS | Performed by: ORTHOPAEDIC SURGERY

## 2019-11-20 PROCEDURE — 99213 OFFICE O/P EST LOW 20 MIN: CPT | Performed by: ORTHOPAEDIC SURGERY

## 2019-11-20 PROCEDURE — 3017F COLORECTAL CA SCREEN DOC REV: CPT | Performed by: ORTHOPAEDIC SURGERY

## 2019-11-20 PROCEDURE — G8400 PT W/DXA NO RESULTS DOC: HCPCS | Performed by: ORTHOPAEDIC SURGERY

## 2019-11-20 PROCEDURE — 1036F TOBACCO NON-USER: CPT | Performed by: ORTHOPAEDIC SURGERY

## 2019-11-20 PROCEDURE — G8484 FLU IMMUNIZE NO ADMIN: HCPCS | Performed by: ORTHOPAEDIC SURGERY

## 2019-11-20 PROCEDURE — G8417 CALC BMI ABV UP PARAM F/U: HCPCS | Performed by: ORTHOPAEDIC SURGERY

## 2019-11-20 PROCEDURE — 1123F ACP DISCUSS/DSCN MKR DOCD: CPT | Performed by: ORTHOPAEDIC SURGERY

## 2019-11-20 PROCEDURE — 4040F PNEUMOC VAC/ADMIN/RCVD: CPT | Performed by: ORTHOPAEDIC SURGERY

## 2019-11-20 RX ORDER — CELECOXIB 100 MG/1
100 CAPSULE ORAL DAILY
COMMUNITY
End: 2019-12-04 | Stop reason: SDUPTHER

## 2019-11-20 RX ORDER — ACETAMINOPHEN 325 MG/1
325 TABLET ORAL EVERY 6 HOURS PRN
COMMUNITY

## 2019-11-22 ENCOUNTER — HOSPITAL ENCOUNTER (OUTPATIENT)
Dept: MRI IMAGING | Facility: CLINIC | Age: 64
Discharge: HOME | End: 2019-11-24
Payer: MEDICARE

## 2019-11-22 DIAGNOSIS — M47.817 LUMBOSACRAL SPONDYLOSIS WITHOUT MYELOPATHY: ICD-10-CM

## 2019-11-22 PROCEDURE — 72148 MRI LUMBAR SPINE W/O DYE: CPT

## 2019-12-04 ENCOUNTER — OFFICE VISIT (OUTPATIENT)
Dept: FAMILY MEDICINE CLINIC | Age: 64
End: 2019-12-04
Payer: MEDICARE

## 2019-12-04 ENCOUNTER — PATIENT MESSAGE (OUTPATIENT)
Dept: FAMILY MEDICINE CLINIC | Age: 64
End: 2019-12-04

## 2019-12-04 VITALS
TEMPERATURE: 96.5 F | OXYGEN SATURATION: 96 % | BODY MASS INDEX: 34.3 KG/M2 | SYSTOLIC BLOOD PRESSURE: 142 MMHG | HEART RATE: 76 BPM | WEIGHT: 226.3 LBS | DIASTOLIC BLOOD PRESSURE: 72 MMHG | HEIGHT: 68 IN | RESPIRATION RATE: 20 BRPM

## 2019-12-04 DIAGNOSIS — M48.061 SPINAL STENOSIS OF LUMBAR REGION WITHOUT NEUROGENIC CLAUDICATION: ICD-10-CM

## 2019-12-04 DIAGNOSIS — M48.061 NEURAL FORAMINAL STENOSIS OF LUMBAR SPINE: ICD-10-CM

## 2019-12-04 DIAGNOSIS — Z76.89 ESTABLISHING CARE WITH NEW DOCTOR, ENCOUNTER FOR: Primary | ICD-10-CM

## 2019-12-04 DIAGNOSIS — M19.019 SHOULDER ARTHRITIS: ICD-10-CM

## 2019-12-04 DIAGNOSIS — M48.061 FORAMINAL STENOSIS OF LUMBAR REGION: ICD-10-CM

## 2019-12-04 DIAGNOSIS — M54.50 LUMBAR BACK PAIN: ICD-10-CM

## 2019-12-04 DIAGNOSIS — J30.89 NON-SEASONAL ALLERGIC RHINITIS DUE TO OTHER ALLERGIC TRIGGER: ICD-10-CM

## 2019-12-04 DIAGNOSIS — I10 ESSENTIAL HYPERTENSION: Chronic | ICD-10-CM

## 2019-12-04 DIAGNOSIS — E78.2 MIXED HYPERLIPIDEMIA: ICD-10-CM

## 2019-12-04 DIAGNOSIS — Z53.20 MEDICATION THERAPY MANAGEMENT RECOMMENDATION REFUSED BY PATIENT: ICD-10-CM

## 2019-12-04 DIAGNOSIS — Z12.11 SCREEN FOR COLON CANCER: ICD-10-CM

## 2019-12-04 PROCEDURE — 1123F ACP DISCUSS/DSCN MKR DOCD: CPT | Performed by: NURSE PRACTITIONER

## 2019-12-04 PROCEDURE — G8417 CALC BMI ABV UP PARAM F/U: HCPCS | Performed by: NURSE PRACTITIONER

## 2019-12-04 PROCEDURE — 3017F COLORECTAL CA SCREEN DOC REV: CPT | Performed by: NURSE PRACTITIONER

## 2019-12-04 PROCEDURE — G8400 PT W/DXA NO RESULTS DOC: HCPCS | Performed by: NURSE PRACTITIONER

## 2019-12-04 PROCEDURE — 4040F PNEUMOC VAC/ADMIN/RCVD: CPT | Performed by: NURSE PRACTITIONER

## 2019-12-04 PROCEDURE — G8427 DOCREV CUR MEDS BY ELIG CLIN: HCPCS | Performed by: NURSE PRACTITIONER

## 2019-12-04 PROCEDURE — 1090F PRES/ABSN URINE INCON ASSESS: CPT | Performed by: NURSE PRACTITIONER

## 2019-12-04 PROCEDURE — G8482 FLU IMMUNIZE ORDER/ADMIN: HCPCS | Performed by: NURSE PRACTITIONER

## 2019-12-04 PROCEDURE — 1036F TOBACCO NON-USER: CPT | Performed by: NURSE PRACTITIONER

## 2019-12-04 PROCEDURE — 99204 OFFICE O/P NEW MOD 45 MIN: CPT | Performed by: NURSE PRACTITIONER

## 2019-12-04 RX ORDER — TRIAMCINOLONE ACETONIDE 55 UG/1
2 SPRAY, METERED NASAL DAILY
Qty: 1 INHALER | Refills: 3 | Status: SHIPPED
Start: 2019-12-04

## 2019-12-04 RX ORDER — SPIRONOLACTONE AND HYDROCHLOROTHIAZIDE 25; 25 MG/1; MG/1
1 TABLET ORAL DAILY
Qty: 90 TABLET | Refills: 3 | Status: SHIPPED | OUTPATIENT
Start: 2019-12-04 | End: 2020-07-02 | Stop reason: SDUPTHER

## 2019-12-04 RX ORDER — CELECOXIB 100 MG/1
100 CAPSULE ORAL 2 TIMES DAILY
Qty: 180 CAPSULE | Refills: 1 | Status: SHIPPED | OUTPATIENT
Start: 2019-12-04 | End: 2020-05-11

## 2019-12-04 ASSESSMENT — ENCOUNTER SYMPTOMS
COUGH: 0
EYE REDNESS: 0
ABDOMINAL PAIN: 1
RHINORRHEA: 0
CONSTIPATION: 0
EYE DISCHARGE: 0
DIARRHEA: 0
NAUSEA: 0
EYE ITCHING: 0
SHORTNESS OF BREATH: 0
SORE THROAT: 0

## 2020-01-20 ENCOUNTER — OFFICE VISIT (OUTPATIENT)
Dept: ORTHOPEDIC SURGERY | Age: 65
End: 2020-01-20
Payer: MEDICARE

## 2020-01-20 VITALS — BODY MASS INDEX: 33.34 KG/M2 | HEIGHT: 68 IN | WEIGHT: 220 LBS

## 2020-01-20 PROCEDURE — G8427 DOCREV CUR MEDS BY ELIG CLIN: HCPCS | Performed by: ORTHOPAEDIC SURGERY

## 2020-01-20 PROCEDURE — 1123F ACP DISCUSS/DSCN MKR DOCD: CPT | Performed by: ORTHOPAEDIC SURGERY

## 2020-01-20 PROCEDURE — G8400 PT W/DXA NO RESULTS DOC: HCPCS | Performed by: ORTHOPAEDIC SURGERY

## 2020-01-20 PROCEDURE — G8417 CALC BMI ABV UP PARAM F/U: HCPCS | Performed by: ORTHOPAEDIC SURGERY

## 2020-01-20 PROCEDURE — 1090F PRES/ABSN URINE INCON ASSESS: CPT | Performed by: ORTHOPAEDIC SURGERY

## 2020-01-20 PROCEDURE — 4040F PNEUMOC VAC/ADMIN/RCVD: CPT | Performed by: ORTHOPAEDIC SURGERY

## 2020-01-20 PROCEDURE — G8482 FLU IMMUNIZE ORDER/ADMIN: HCPCS | Performed by: ORTHOPAEDIC SURGERY

## 2020-01-20 PROCEDURE — 3017F COLORECTAL CA SCREEN DOC REV: CPT | Performed by: ORTHOPAEDIC SURGERY

## 2020-01-20 PROCEDURE — 1036F TOBACCO NON-USER: CPT | Performed by: ORTHOPAEDIC SURGERY

## 2020-01-20 PROCEDURE — 99213 OFFICE O/P EST LOW 20 MIN: CPT | Performed by: ORTHOPAEDIC SURGERY

## 2020-01-20 RX ORDER — LIDOCAINE HYDROCHLORIDE 10 MG/ML
4 INJECTION, SOLUTION INFILTRATION; PERINEURAL ONCE
Status: CANCELLED | OUTPATIENT
Start: 2020-01-20 | End: 2020-01-20

## 2020-01-20 RX ORDER — OXYCODONE HYDROCHLORIDE AND ACETAMINOPHEN 5; 325 MG/1; MG/1
1 TABLET ORAL 2 TIMES DAILY
COMMUNITY
Start: 2020-01-19 | End: 2021-11-17 | Stop reason: ALTCHOICE

## 2020-01-20 RX ORDER — TRIAMCINOLONE ACETONIDE 40 MG/ML
40 INJECTION, SUSPENSION INTRA-ARTICULAR; INTRAMUSCULAR ONCE
Status: CANCELLED | OUTPATIENT
Start: 2020-01-20 | End: 2020-01-20

## 2020-01-23 PROBLEM — M19.011 OSTEOARTHRITIS OF RIGHT GLENOHUMERAL JOINT: Status: ACTIVE | Noted: 2020-01-23

## 2020-01-23 NOTE — PROGRESS NOTES
Orthopedic Shoulder Encounter Note     Chief complaint: Right shoulder pain    HPI: Debra Pepe is a 71 y.o.  right-hand dominant female who presents for evaluation of her right shoulder. She has been having pain for several years now. She denies any precipitating trauma or injury. Her pain is progressively gotten worse to the point where she is unable to raise her arm due to combination of pain and stiffness. She is also quite painful at nighttime finding it difficult to sleep. Her pain is worse with any movement or activity. She denies having any associated weakness. Of note she has a history of bilateral total knee arthroplasties and a right total hip arthroplasty. Previous treatment:    NSAIDs: Celebrex    Physical Therapy:  No    Injections: None    Surgeries: None    Review of Systems:     Constitution: no fever or chills   Pain level: 10/10  Musculoskeletal: As noted in the HPI   Neurologic: no neurologic symptoms    Past Medical History  Roberto Dunn  has a past medical history of Arthritis, Diverticula, colon, Hyperlipidemia, Hypertension, and Spinal stenosis. Past Surgical History  Roberto Dunn  has a past surgical history that includes Hysterectomy; hip surgery (Right, 2007); laminectomy (2013); Cholecystectomy, open (1976); and joint replacement (Bilateral, 2009). Current Medications  Current Outpatient Medications   Medication Sig Dispense Refill    celecoxib (CELEBREX) 100 MG capsule Take 1 capsule by mouth 2 times daily 180 capsule 1    triamcinolone (NASACORT ALLERGY 24HR) 55 MCG/ACT nasal inhaler 2 sprays by Each Nostril route daily 1 Inhaler 3    spironolactone-hydrochlorothiazide (ALDACTAZIDE) 25-25 MG per tablet Take 1 tablet by mouth daily 90 tablet 3    Handicap Placard MISC by Does not apply route Expires in 5 years.  12/4/2024 1 each 0    acetaminophen (TYLENOL) 325 MG tablet Take 325 mg by mouth every 6 hours as needed for Pain      tiZANidine (ZANAFLEX) 4 MG tablet adduction  n       Labrum/biceps    y (equivocal)  Erie's    n   n   Biceps sheer    n      y   Speed's/Yergason's   n    y   Tenderness Biceps Groove  n    n   Kelvin's    n         Instability  n   Ant Apprehension   n    n   Post Apprehension   n    n   Ant Load shift    n    n   Post Load shift   n   n   Sulcus     n  n   Generalized Laxity   n  n   Relocation test   n  n   Crank test     n  n   Gray-superior escape  n       Imaging:  X-rays of the right shoulder from 11/20/2019 were made available for review demonstrating complete glenohumeral joint space loss associated with significant osteophytic change and subchondral sclerosis consistent with severe osteoarthritis. Impression/Plan:     Birdie Kiran is a 71 y.o. old female with right shoulder pain and dysfunction due to severe glenohumeral osteoarthritis. I had a discussion with the patient today with regards to the nature and extent of her problem. We did discuss treatment options available to her including conservative and surgical intervention. As noted above she has been treated with prescription anti-inflammatories without significant improvement. We discussed the possibility of a cortisone injection which she declined because of the fact that she states that she has had this in other joints and does not believe it will be successful here. As a result we did discuss the possibility of a shoulder replacement discussing the details of the procedure which she can expect in the postoperative recovery course. Following our discussion she is going to take some time to think things over and will call with regards to her decision and how she would like to proceed.         NA = Not assessed  RTC = Rotator cuff  RCT = Rotator cuff tear  ER = External rotation  IR = Internal rotation  AC = Acromioclavicular  GH = Glenohumeral  n = No  y = Yes

## 2020-02-04 ENCOUNTER — OFFICE VISIT (OUTPATIENT)
Dept: NEUROSURGERY | Age: 65
End: 2020-02-04
Payer: MEDICARE

## 2020-02-04 VITALS
HEIGHT: 68 IN | BODY MASS INDEX: 34.56 KG/M2 | OXYGEN SATURATION: 97 % | HEART RATE: 74 BPM | DIASTOLIC BLOOD PRESSURE: 85 MMHG | WEIGHT: 228 LBS | SYSTOLIC BLOOD PRESSURE: 153 MMHG

## 2020-02-04 PROCEDURE — G8482 FLU IMMUNIZE ORDER/ADMIN: HCPCS | Performed by: NURSE PRACTITIONER

## 2020-02-04 PROCEDURE — 4040F PNEUMOC VAC/ADMIN/RCVD: CPT | Performed by: NURSE PRACTITIONER

## 2020-02-04 PROCEDURE — G8427 DOCREV CUR MEDS BY ELIG CLIN: HCPCS | Performed by: NURSE PRACTITIONER

## 2020-02-04 PROCEDURE — G8417 CALC BMI ABV UP PARAM F/U: HCPCS | Performed by: NURSE PRACTITIONER

## 2020-02-04 PROCEDURE — G8400 PT W/DXA NO RESULTS DOC: HCPCS | Performed by: NURSE PRACTITIONER

## 2020-02-04 PROCEDURE — 3017F COLORECTAL CA SCREEN DOC REV: CPT | Performed by: NURSE PRACTITIONER

## 2020-02-04 PROCEDURE — 1123F ACP DISCUSS/DSCN MKR DOCD: CPT | Performed by: NURSE PRACTITIONER

## 2020-02-04 PROCEDURE — 99214 OFFICE O/P EST MOD 30 MIN: CPT | Performed by: NURSE PRACTITIONER

## 2020-02-04 PROCEDURE — 1036F TOBACCO NON-USER: CPT | Performed by: NURSE PRACTITIONER

## 2020-02-04 PROCEDURE — 1090F PRES/ABSN URINE INCON ASSESS: CPT | Performed by: NURSE PRACTITIONER

## 2020-02-04 RX ORDER — BLACK COHOSH ROOT EXTRACT 80 MG
2 CAPSULE ORAL 2 TIMES DAILY
COMMUNITY

## 2020-02-04 RX ORDER — VITAMIN B COMPLEX
1 TABLET ORAL 2 TIMES DAILY
COMMUNITY

## 2020-02-04 NOTE — PROGRESS NOTES
urgency  Constipation or Urinary retention: No    LIMITATIONS    Pain significantly limiting from a daily standpoint. Assistive devices: walker after surgery  Daily pharmacologic pain control include: narcotics; NSAIDs  Back versus le/20    MANAGEMENT     Prior Surgery: Yes-4-5 years ago, laminectomy  Prior to 1yr ago: In the last year:    Injections: Yes  Improvement: no-made it worse   Physical Therapy: No   Chiropractic Interventions: No    Unclear what injections she has had, she describes what sounds caudal epidurals in the past that were somewhat helpful, most recently lumbar epidural that she states made symptoms worse. History:     Past Medical History:   Diagnosis Date    Arthritis     Diverticula, colon     Hyperlipidemia     Hypertension     Spinal stenosis      Past Surgical History:   Procedure Laterality Date    CHOLECYSTECTOMY, OPEN  1976    HIP SURGERY Right 2007    HYSTERECTOMY      JOINT REPLACEMENT Bilateral 2009    LAMINECTOMY       Family History   Problem Relation Age of Onset    Diabetes Mother     Heart Failure Mother     Diabetes Father     Kidney Disease Father      Current Outpatient Medications on File Prior to Visit   Medication Sig Dispense Refill    diphenhydrAMINE-APAP, sleep, (TYLENOL PM EXTRA STRENGTH PO) Take by mouth nightly as needed      Multiple Vitamins-Minerals (CENTRUM SILVER 50+WOMEN) TABS Take 1 tablet by mouth daily      Specialty Vitamins Products (HAIR NOURISHING SUPPLEMENT) TABS Take 1 tablet by mouth daily      Coenzyme Q10 (COQ10) 100 MG CAPS Take 1 tablet by mouth 2 times daily      Omega 3-6-9 Fatty Acids (OMEGA 3-6-9 COMPLEX) CAPS Take 2 tablets by mouth 2 times daily      oxyCODONE-acetaminophen (PERCOCET) 5-325 MG per tablet Take 1 tablet by mouth as needed.       celecoxib (CELEBREX) 100 MG capsule Take 1 capsule by mouth 2 times daily 180 capsule 1    triamcinolone (NASACORT ALLERGY 24HR) 55 MCG/ACT nasal inhaler 2 sprays by (103.4 kg)   SpO2 97%   BMI 34.67 kg/m²   Estimated body mass index is 34.67 kg/m² as calculated from the following:    Height as of this encounter: 5' 8\" (1.727 m). Weight as of this encounter: 228 lb (103.4 kg). General:  Duane Nunez is a 71y.o. year old female who appears her stated age. HEENT: Normocephalic atraumatic. Neck supple. Chest: regular rate; pulses equal  Abdomen: Soft nontender nondistended. Ext: DP and PT pulses 2+, good cap refill  Neuro    Mentation  Appropriate affect  Registration intact  Orientation intact    Cranial Nerves:   Pupils equal and reactive to light  Extraocular motion intact  Face and shrug symmetric  Tongue midline  No dysarthria  v1-3 sensation symmetric, masseter tone symmetric  Hearing symmetric and intact    Sensation: Mild decreased to distal bilateral lower extremities    Motor  L deltoid 5/5; R deltoid 5/5  L biceps 5/5; R biceps 5/5  L triceps 5/5; R triceps 5/5  L wrist extension 5/5; R wrist extension 5/5  L intrinsics 5/5; R intrinsics 5/5     L iliopsoas 4-/5 , R iliopsoas 4-/5  L quadriceps 4/5; R quadriceps 4/5  L Dorsiflexion 4/5; R dorsiflexion 4/5  L Plantarflexion 4/5; R plantarflexion 4/5  L EHL 4/5; R EHL 4/5    Reflexes  L Brachioradialis 2+/4; R brachioradialis 2+/4  L Biceps 2+/4; R Biceps 2+/4  L Triceps 2+/4; R Triceps 2+/4  L Patellar 2/4: R Patellar 2/4  L Achilles 2/4; R Achilles 2/4    hoffmans L: neg  hoffmans R: neg  Clonus L: neg  Clonus R: neg  Babinski L: neg  Babinski R: neg    JAKOB: Negative  Straight leg raise: Elicits only low back pain    Studies Review:     MRI lumbar spine 11/22/2019:  1.  Degenerative changes in the lumbar spine lead to moderate to severe right   and moderate left neural foraminal narrowing at L2-L3.       2.  Moderate to severe bilateral neural foraminal narrowing at L3-L4 and   L4-L5.       3.  Severe left and moderate to severe right neural foraminal narrowing at   L5-S1.      Assessment and Plan:

## 2020-02-17 ENCOUNTER — OFFICE VISIT (OUTPATIENT)
Dept: ORTHOPEDIC SURGERY | Age: 65
End: 2020-02-17
Payer: MEDICARE

## 2020-02-17 VITALS — BODY MASS INDEX: 34.86 KG/M2 | WEIGHT: 230 LBS | HEIGHT: 68 IN

## 2020-02-17 PROCEDURE — 20610 DRAIN/INJ JOINT/BURSA W/O US: CPT | Performed by: ORTHOPAEDIC SURGERY

## 2020-02-17 RX ORDER — LIDOCAINE HYDROCHLORIDE 10 MG/ML
4 INJECTION, SOLUTION INFILTRATION; PERINEURAL ONCE
Status: COMPLETED | OUTPATIENT
Start: 2020-02-17 | End: 2020-02-17

## 2020-02-17 RX ORDER — TRIAMCINOLONE ACETONIDE 40 MG/ML
40 INJECTION, SUSPENSION INTRA-ARTICULAR; INTRAMUSCULAR ONCE
Status: COMPLETED | OUTPATIENT
Start: 2020-02-17 | End: 2020-02-17

## 2020-02-17 RX ADMIN — LIDOCAINE HYDROCHLORIDE 4 ML: 10 INJECTION, SOLUTION INFILTRATION; PERINEURAL at 12:10

## 2020-02-17 RX ADMIN — TRIAMCINOLONE ACETONIDE 40 MG: 40 INJECTION, SUSPENSION INTRA-ARTICULAR; INTRAMUSCULAR at 12:10

## 2020-03-17 RX ORDER — TIZANIDINE 4 MG/1
TABLET ORAL
Qty: 180 TABLET | Refills: 1 | Status: SHIPPED | OUTPATIENT
Start: 2020-03-17 | End: 2020-07-02 | Stop reason: SDUPTHER

## 2020-04-03 ENCOUNTER — E-VISIT (OUTPATIENT)
Dept: FAMILY MEDICINE CLINIC | Age: 65
End: 2020-04-03
Payer: MEDICARE

## 2020-04-03 PROCEDURE — 99422 OL DIG E/M SVC 11-20 MIN: CPT | Performed by: INTERNAL MEDICINE

## 2020-04-03 RX ORDER — AMOXICILLIN 875 MG/1
875 TABLET, COATED ORAL 2 TIMES DAILY
Qty: 14 TABLET | Refills: 0 | Status: SHIPPED | OUTPATIENT
Start: 2020-04-03 | End: 2020-04-10

## 2020-04-03 ASSESSMENT — LIFESTYLE VARIABLES: SMOKING_STATUS: NO, I'VE NEVER SMOKED

## 2020-07-02 ENCOUNTER — OFFICE VISIT (OUTPATIENT)
Dept: FAMILY MEDICINE CLINIC | Age: 65
End: 2020-07-02
Payer: MEDICARE

## 2020-07-02 VITALS
OXYGEN SATURATION: 97 % | DIASTOLIC BLOOD PRESSURE: 84 MMHG | WEIGHT: 237.8 LBS | TEMPERATURE: 96.9 F | RESPIRATION RATE: 18 BRPM | HEART RATE: 47 BPM | SYSTOLIC BLOOD PRESSURE: 132 MMHG | BODY MASS INDEX: 36.16 KG/M2

## 2020-07-02 PROCEDURE — 4040F PNEUMOC VAC/ADMIN/RCVD: CPT | Performed by: NURSE PRACTITIONER

## 2020-07-02 PROCEDURE — 1036F TOBACCO NON-USER: CPT | Performed by: NURSE PRACTITIONER

## 2020-07-02 PROCEDURE — 3017F COLORECTAL CA SCREEN DOC REV: CPT | Performed by: NURSE PRACTITIONER

## 2020-07-02 PROCEDURE — G8417 CALC BMI ABV UP PARAM F/U: HCPCS | Performed by: NURSE PRACTITIONER

## 2020-07-02 PROCEDURE — G8400 PT W/DXA NO RESULTS DOC: HCPCS | Performed by: NURSE PRACTITIONER

## 2020-07-02 PROCEDURE — 1090F PRES/ABSN URINE INCON ASSESS: CPT | Performed by: NURSE PRACTITIONER

## 2020-07-02 PROCEDURE — G8427 DOCREV CUR MEDS BY ELIG CLIN: HCPCS | Performed by: NURSE PRACTITIONER

## 2020-07-02 PROCEDURE — 1123F ACP DISCUSS/DSCN MKR DOCD: CPT | Performed by: NURSE PRACTITIONER

## 2020-07-02 PROCEDURE — 99215 OFFICE O/P EST HI 40 MIN: CPT | Performed by: NURSE PRACTITIONER

## 2020-07-02 RX ORDER — CELECOXIB 100 MG/1
100 CAPSULE ORAL 2 TIMES DAILY
Qty: 180 CAPSULE | Refills: 1 | Status: SHIPPED | OUTPATIENT
Start: 2020-07-02 | End: 2021-02-10

## 2020-07-02 RX ORDER — TIZANIDINE 4 MG/1
TABLET ORAL
Qty: 180 TABLET | Refills: 1 | Status: SHIPPED | OUTPATIENT
Start: 2020-07-02 | End: 2021-04-23 | Stop reason: SDUPTHER

## 2020-07-02 RX ORDER — SPIRONOLACTONE AND HYDROCHLOROTHIAZIDE 25; 25 MG/1; MG/1
1 TABLET ORAL DAILY
Qty: 90 TABLET | Refills: 1 | Status: SHIPPED | OUTPATIENT
Start: 2020-07-02 | End: 2021-05-20

## 2020-07-02 ASSESSMENT — ENCOUNTER SYMPTOMS
SHORTNESS OF BREATH: 0
DIARRHEA: 0
CHEST TIGHTNESS: 0
TROUBLE SWALLOWING: 0
SINUS PRESSURE: 0
BACK PAIN: 1
NAUSEA: 0
VOMITING: 0
SORE THROAT: 0
ABDOMINAL PAIN: 0
WHEEZING: 0
RHINORRHEA: 0
COUGH: 0
CONSTIPATION: 0

## 2020-07-05 PROBLEM — E66.01 MORBIDLY OBESE (HCC): Status: ACTIVE | Noted: 2020-07-05

## 2020-08-04 ENCOUNTER — NURSE TRIAGE (OUTPATIENT)
Dept: OTHER | Facility: CLINIC | Age: 65
End: 2020-08-04

## 2020-08-04 NOTE — TELEPHONE ENCOUNTER
Reason for Disposition   Shingles rash and spots start appearing other places on body    Answer Assessment - Initial Assessment Questions  1. APPEARANCE of RASH: \"Describe the rash. \"       Blisters in left groin and up the left hip  2. LOCATION: \"Where is the rash located? \"       See item 1  3. ONSET: \"When did the rash start? \"       Started on Saturday  4. ITCHING: \"Does the rash itch? \" If so, ask: \"How bad is the itch? \"  (Scale 1-10; or mild, moderate, severe)      No itching  5. PAIN: \"Does the rash hurt? \" If so, ask: \"How bad is the pain? \"  (Scale 1-10; or mild, moderate, severe)      Burns like fire (5/10)  6. OTHER SYMPTOMS: \"Do you have any other symptoms? \" (e.g., fever)      No other symptoms  7. PREGNANCY: \"Is there any chance you are pregnant? \" \"When was your last menstrual period? \"      no    Protocols used: SHINGLES-ADULT-OH    Received call from [address]. Call soft transferred to [address] to schedule appointment. Please do not reply to the triage nurse through this encounter. Any subsequent communication should be directly with the patient. Patient reports blisters in her left groin that are spreading to her left hip. Patient states that the rash burns \"like fire\". Recommended patient be seen today at a walk in clinic as her provider has no availability. Care advice provided. Warm transfer to service center [facility]) for walk in clinic information.

## 2020-08-05 ENCOUNTER — OFFICE VISIT (OUTPATIENT)
Dept: FAMILY MEDICINE CLINIC | Age: 65
End: 2020-08-05
Payer: MEDICARE

## 2020-08-05 VITALS
OXYGEN SATURATION: 97 % | SYSTOLIC BLOOD PRESSURE: 134 MMHG | HEART RATE: 99 BPM | TEMPERATURE: 97.8 F | HEIGHT: 68 IN | BODY MASS INDEX: 35.61 KG/M2 | DIASTOLIC BLOOD PRESSURE: 84 MMHG | WEIGHT: 235 LBS

## 2020-08-05 PROCEDURE — 1090F PRES/ABSN URINE INCON ASSESS: CPT | Performed by: PHYSICIAN ASSISTANT

## 2020-08-05 PROCEDURE — G8400 PT W/DXA NO RESULTS DOC: HCPCS | Performed by: PHYSICIAN ASSISTANT

## 2020-08-05 PROCEDURE — 1123F ACP DISCUSS/DSCN MKR DOCD: CPT | Performed by: PHYSICIAN ASSISTANT

## 2020-08-05 PROCEDURE — 4040F PNEUMOC VAC/ADMIN/RCVD: CPT | Performed by: PHYSICIAN ASSISTANT

## 2020-08-05 PROCEDURE — 3017F COLORECTAL CA SCREEN DOC REV: CPT | Performed by: PHYSICIAN ASSISTANT

## 2020-08-05 PROCEDURE — G8417 CALC BMI ABV UP PARAM F/U: HCPCS | Performed by: PHYSICIAN ASSISTANT

## 2020-08-05 PROCEDURE — 99213 OFFICE O/P EST LOW 20 MIN: CPT | Performed by: PHYSICIAN ASSISTANT

## 2020-08-05 PROCEDURE — G8427 DOCREV CUR MEDS BY ELIG CLIN: HCPCS | Performed by: PHYSICIAN ASSISTANT

## 2020-08-05 PROCEDURE — 1036F TOBACCO NON-USER: CPT | Performed by: PHYSICIAN ASSISTANT

## 2020-08-05 RX ORDER — GREEN TEA/HOODIA GORDONII 315-12.5MG
1 CAPSULE ORAL DAILY
Qty: 30 TABLET | Refills: 0 | Status: SHIPPED | OUTPATIENT
Start: 2020-08-05 | End: 2020-09-04

## 2020-08-05 RX ORDER — IBUPROFEN 800 MG/1
800 TABLET ORAL EVERY 6 HOURS PRN
Qty: 28 TABLET | Refills: 0 | Status: SHIPPED | OUTPATIENT
Start: 2020-08-05 | End: 2021-04-23 | Stop reason: ALTCHOICE

## 2020-08-05 RX ORDER — ONDANSETRON 4 MG/1
4 TABLET, ORALLY DISINTEGRATING ORAL EVERY 8 HOURS PRN
Qty: 20 TABLET | Refills: 0 | Status: SHIPPED | OUTPATIENT
Start: 2020-08-05 | End: 2021-04-23 | Stop reason: ALTCHOICE

## 2020-08-05 RX ORDER — PREDNISONE 20 MG/1
20 TABLET ORAL 2 TIMES DAILY
Qty: 10 TABLET | Refills: 0 | Status: SHIPPED | OUTPATIENT
Start: 2020-08-05 | End: 2020-08-10

## 2020-08-05 RX ORDER — VALACYCLOVIR HYDROCHLORIDE 1 G/1
1000 TABLET, FILM COATED ORAL 3 TIMES DAILY
Qty: 21 TABLET | Refills: 0 | Status: SHIPPED | OUTPATIENT
Start: 2020-08-05 | End: 2020-08-12

## 2020-08-05 ASSESSMENT — ENCOUNTER SYMPTOMS
DIARRHEA: 0
SHORTNESS OF BREATH: 0
COUGH: 0
VOMITING: 0
SORE THROAT: 0
EYE PAIN: 0
RHINORRHEA: 0
NAIL CHANGES: 0

## 2020-08-05 NOTE — PROGRESS NOTES
Hyperlipidemia     Hypertension     Spinal stenosis          SURGICAL HISTORY     History reviewed.     Past Surgical History:   Procedure Laterality Date    CHOLECYSTECTOMY, OPEN  12    HIP SURGERY Right 2007    HYSTERECTOMY      JOINT REPLACEMENT Bilateral 2009    LAMINECTOMY  2013         CURRENT MEDICATIONS       Current Outpatient Medications   Medication Sig Dispense Refill    Probiotic Acidophilus (FLORANEX) TABS Take 1 tablet by mouth daily 30 tablet 0    ondansetron (ZOFRAN ODT) 4 MG disintegrating tablet Take 1 tablet by mouth every 8 hours as needed for Nausea or Vomiting 20 tablet 0    valACYclovir (VALTREX) 1 g tablet Take 1 tablet by mouth 3 times daily for 7 days 21 tablet 0    predniSONE (DELTASONE) 20 MG tablet Take 1 tablet by mouth 2 times daily for 5 days 10 tablet 0    Capsaicin (ZOSTRIX) 0.035 % CREA cream Apply topically 3 times daily 1 Tube 0    ibuprofen (ADVIL;MOTRIN) 800 MG tablet Take 1 tablet by mouth every 6 hours as needed for Pain 28 tablet 0    NONFORMULARY Take 1 tablet by mouth 2 times daily Viviscal      Tens Unit MISC by Does not apply route      tiZANidine (ZANAFLEX) 4 MG tablet take 1 tablet by mouth every 8 hours if needed for BACK SPASMS 180 tablet 1    celecoxib (CELEBREX) 100 MG capsule Take 1 capsule by mouth 2 times daily 180 capsule 1    spironolactone-hydroCHLOROthiazide (ALDACTAZIDE) 25-25 MG per tablet Take 1 tablet by mouth daily 90 tablet 1    diphenhydrAMINE-APAP, sleep, (TYLENOL PM EXTRA STRENGTH PO) Take by mouth nightly as needed      Multiple Vitamins-Minerals (CENTRUM SILVER 50+WOMEN) TABS Take 1 tablet by mouth daily      Specialty Vitamins Products (HAIR NOURISHING SUPPLEMENT) TABS Take 1 tablet by mouth daily      Coenzyme Q10 (COQ10) 100 MG CAPS Take 1 tablet by mouth 2 times daily      Omega 3-6-9 Fatty Acids (OMEGA 3-6-9 COMPLEX) CAPS Take 2 tablets by mouth 2 times daily      oxyCODONE-acetaminophen (PERCOCET) 5-325 MG per tablet Take 1 tablet by mouth as needed.  triamcinolone (NASACORT ALLERGY 24HR) 55 MCG/ACT nasal inhaler 2 sprays by Each Nostril route daily 1 Inhaler 3    Handicap Placard MISC by Does not apply route Expires in 5 years. 2024 1 each 0    acetaminophen (TYLENOL) 325 MG tablet Take 325 mg by mouth every 6 hours as needed for Pain      dicyclomine (BENTYL) 10 MG capsule Take 1 capsule by mouth daily as needed (spasms) 60 capsule 5     No current facility-administered medications for this visit. ALLERGIES     Adhesive tape and Morphine    FAMILY HISTORY           Problem Relation Age of Onset    Diabetes Mother     Heart Failure Mother     Diabetes Father     Kidney Disease Father      Family Status   Relation Name Status    Mother      Father            SOCIAL HISTORY      reports that she has never smoked. She has never used smokeless tobacco. She reports that she does not drink alcohol or use drugs. PHYSICAL EXAM    (up to 7 for level 4, 8 or more for level 5)     Vitals:    20 1015   BP: 134/84   Site: Left Upper Arm   Position: Sitting   Cuff Size: Medium Adult   Pulse: 99   Temp: 97.8 °F (36.6 °C)   TempSrc: Temporal   SpO2: 97%   Weight: 235 lb (106.6 kg)   Height: 5' 8\" (1.727 m)         Physical Exam  Vitals signs and nursing note reviewed. Constitutional:       Appearance: Normal appearance. HENT:      Head: Normocephalic and atraumatic. Right Ear: External ear normal.      Left Ear: External ear normal.      Nose: Nose normal.      Mouth/Throat:      Mouth: Mucous membranes are moist.   Eyes:      Conjunctiva/sclera: Conjunctivae normal.      Pupils: Pupils are equal, round, and reactive to light. Pulmonary:      Effort: Pulmonary effort is normal.   Abdominal:      Palpations: Abdomen is soft. Skin:     General: Skin is warm and dry. Findings: Rash present.           Neurological:      Mental Status: She is alert and oriented to person, place, and time. DIFFERENTIAL DIAGNOSIS:       Ian Sorensen reviewed the disposition diagnosis with the patient and or their family/guardian. I have answered their questions and given discharge instructions. They voiced understanding of these instructions and did not have anyfurther questions or complaints. PROCEDURES:  No orders of the defined types were placed in this encounter. No results found for this visit on 08/05/20. FINALIMPRESSION      Visit Diagnoses and Associated Orders     Herpes zoster without complication    -  Primary         ORDERS WITHOUT AN ASSOCIATED DIAGNOSIS    Probiotic Acidophilus (FLORANEX) TABS [050281]      ondansetron (ZOFRAN ODT) 4 MG disintegrating tablet [00882]      valACYclovir (VALTREX) 1 g tablet [60297]      predniSONE (DELTASONE) 20 MG tablet [6496]      Capsaicin (ZOSTRIX) 0.035 % CREA cream [88077]      ibuprofen (ADVIL;MOTRIN) 800 MG tablet [3845]              PLAN     Return if symptoms worsen or fail to improve.       DISCHARGEMEDICATIONS:  Orders Placed This Encounter   Medications    Probiotic Acidophilus (FLORANEX) TABS     Sig: Take 1 tablet by mouth daily     Dispense:  30 tablet     Refill:  0    ondansetron (ZOFRAN ODT) 4 MG disintegrating tablet     Sig: Take 1 tablet by mouth every 8 hours as needed for Nausea or Vomiting     Dispense:  20 tablet     Refill:  0    valACYclovir (VALTREX) 1 g tablet     Sig: Take 1 tablet by mouth 3 times daily for 7 days     Dispense:  21 tablet     Refill:  0    predniSONE (DELTASONE) 20 MG tablet     Sig: Take 1 tablet by mouth 2 times daily for 5 days     Dispense:  10 tablet     Refill:  0    Capsaicin (ZOSTRIX) 0.035 % CREA cream     Sig: Apply topically 3 times daily     Dispense:  1 Tube     Refill:  0    ibuprofen (ADVIL;MOTRIN) 800 MG tablet     Sig: Take 1 tablet by mouth every 6 hours as needed for Pain     Dispense:  28 tablet     Refill:  0         Plan:  Based on the clinical exam

## 2020-08-05 NOTE — PATIENT INSTRUCTIONS
Patient Education        Shingles: Care Instructions  Your Care Instructions     Shingles (herpes zoster) causes pain and a blistered rash. The rash can appear anywhere on the body but will be on only one side of the body, the left or right. It will be in a band, a strip, or a small area. The pain can be very severe. Shingles can also cause tingling or itching in the area of the rash. The blisters scab over after a few days and heal in 2 to 4 weeks. Medicines can help you feel better and may help prevent more serious problems caused by shingles. Shingles is caused by the same virus that causes chickenpox. When you have chickenpox, the virus gets into your nerve roots and stays there (becomes dormant) long after you get over the chickenpox. If the virus becomes active again, it can cause shingles. Follow-up care is a key part of your treatment and safety. Be sure to make and go to all appointments, and call your doctor if you are having problems. It's also a good idea to know your test results and keep a list of the medicines you take. How can you care for yourself at home? · Be safe with medicines. Take your medicines exactly as prescribed. Call your doctor if you think you are having a problem with your medicine. Antiviral medicine helps you get better faster. · Try not to scratch or pick at the blisters. They will crust over and fall off on their own if you leave them alone. · Put cool, wet cloths on the area to relieve pain and itching. You can also use calamine lotion. Try not to use so much lotion that it cakes and is hard to get off. · Put cornstarch or baking soda on the sores to help dry them out so they heal faster. · Do not use thick ointment, such as petroleum jelly, on the sores. This will keep them from drying and healing. · To help remove loose crusts, soak them in tap water. This can help decrease oozing, and dry and soothe the skin.   · Take an over-the-counter pain medicine, such as acetaminophen (Tylenol), ibuprofen (Advil, Motrin), or naproxen (Aleve). Read and follow all instructions on the label. · Avoid close contact with people until the blisters have healed. It is very important for you to avoid contact with anyone who has never had chickenpox or the chickenpox vaccine. Pregnant women, young babies, and anyone else who has a hard time fighting infection (such as someone with HIV, diabetes, or cancer) is especially at risk. When should you call for help? Call your doctor now or seek immediate medical care if:  · You have a new or higher fever. · You have a severe headache and a stiff neck. · You lose the ability to think clearly. · The rash spreads to your forehead, nose, eyes, or eyelids. · You have eye pain, or your vision gets worse. · You have new pain in your face, or you cannot move the muscles in your face. · Blisters spread to new parts of your body. Watch closely for changes in your health, and be sure to contact your doctor if:  · The rash has not healed after 2 to 4 weeks. · You still have pain after the rash has healed. Where can you learn more? Go to [email or web address] org and sign in to your [employer] account. Renu Meraz in the [facility] box to learn more about \"Shingles: Care Instructions. \"     If you do not have an account, please click on the \"Sign Up Now\" link. Current as of: February 11, 2020               Content Version: 12.5  © 2006-2020 Healthwise, Incorporated. Care instructions adapted under license by [facility] ([facility]). If you have questions about a medical condition or this instruction, always ask your healthcare professional. [place] [address] any warranty or liability for your use of this information.

## 2020-11-13 ENCOUNTER — E-VISIT (OUTPATIENT)
Dept: PRIMARY CARE CLINIC | Age: 65
End: 2020-11-13
Payer: MEDICARE

## 2020-11-13 PROCEDURE — 99422 OL DIG E/M SVC 11-20 MIN: CPT | Performed by: NURSE PRACTITIONER

## 2020-11-13 RX ORDER — AMOXICILLIN 500 MG/1
500 CAPSULE ORAL 3 TIMES DAILY
Qty: 30 CAPSULE | Refills: 0 | Status: SHIPPED | OUTPATIENT
Start: 2020-11-13 | End: 2020-11-23

## 2020-11-13 ASSESSMENT — LIFESTYLE VARIABLES: SMOKING_STATUS: NO, I'VE NEVER SMOKED

## 2020-11-13 NOTE — PROGRESS NOTES
HPI: per questionnaire. ROS: per questionnaire  PE: n/a  Dx:    Diagnosis Orders   1. Acute bacterial sinusitis       No orders of the defined types were placed in this encounter. 11-20 minutes were spent on the digital evaluation and management of this patient.

## 2020-12-04 ENCOUNTER — NURSE TRIAGE (OUTPATIENT)
Dept: OTHER | Facility: CLINIC | Age: 65
End: 2020-12-04

## 2020-12-04 ENCOUNTER — TELEPHONE (OUTPATIENT)
Dept: FAMILY MEDICINE CLINIC | Age: 65
End: 2020-12-04

## 2020-12-04 NOTE — TELEPHONE ENCOUNTER
I personally think ever given the patient's medication for situation like this. Normally if someone has severe diarrhea we avoid taking anything like Imodium as we wants whatever virus is causing it to exit the body. I personally always prescribe probiotics when I give antibiotics to ensure that there is still good bacteria. Will be there is good bacteria that is also being removed from the antibiotic, then it can have diarrhea. With that being said I strongly encourage patient to increase her water intake. If she is having that severe diarrhea she probably should go to the emergency room and have IV replacement of fluids and have her electrolytes checked because she can be depleted in potassium.

## 2020-12-04 NOTE — TELEPHONE ENCOUNTER
Patient has had severe diarrhea about 40 episodes in the past 24 hours per PT. She says this happens every time she takes antibiotics (which she is currently taking)    Pt declined urg care/walkin and is asking if you can rx what you sent her last time this happened. [facility] in [place] is current pharmacy. Pt did also speak with [facility] Nurse triage whom advised her to be seen today and pt still req that a message be sent to PCP.         Please call pt with direction  Thank you

## 2020-12-04 NOTE — TELEPHONE ENCOUNTER
Patient was given the recommendations. Patient was upset and states that Qing Hawkins will just stay home and die\".

## 2020-12-04 NOTE — TELEPHONE ENCOUNTER
Patient called pre-service center [facility]) [place] with red flag complaint. Brief description of triage: diarrhea    Triage indicates for patient to go to [address] or office with PCP approval. Patient is requesting call back from PCP only. Care advice provided, patient verbalizes understanding; denies any other questions or concerns; instructed to call back for any new or worsening symptoms. Writer provided warm transfer to [facility] for appointment scheduling. Attention Provider: Thank you for allowing me to participate in the care of your patient. The patient was connected to triage in response to information provided to the [facility]. Please do not respond through this encounter as the response is not directed to a shared pool. Reason for Disposition   SEVERE diarrhea (e.g., 7 or more times / day more than normal) and age > 60 years    Answer Assessment - Initial Assessment Questions  1. DIARRHEA SEVERITY: \"How bad is the diarrhea? \" \"How many extra stools have you had in the past 24 hours than normal?\"     - NO DIARRHEA (SCALE 0)    - MILD (SCALE 1-3): Few loose or mushy BMs; increase of 1-3 stools over normal daily number of stools; mild increase in ostomy output. -  MODERATE (SCALE 4-7): Increase of 4-6 stools daily over normal; moderate increase in ostomy output. * SEVERE (SCALE 8-10; OR 'WORST POSSIBLE'): Increase of 7 or more stools daily over normal; moderate increase in ostomy output; incontinence. 40 and watery stool  2. ONSET: \"When did the diarrhea begin? \"       About a week, but last 24 hours is worse    3. BM CONSISTENCY: \"How loose or watery is the diarrhea? \"       Watery    4. VOMITING: \"Are you also vomiting? \" If so, ask: \"How many times in the past 24 hours? \"       No    5. ABDOMINAL PAIN: Cayden Toni you having any abdominal pain? \" If yes: \"What does it feel like? \" (e.g., crampy, dull, intermittent, constant)       Cramping    6.  ABDOMINAL PAIN SEVERITY: If present, ask: \"How bad is the pain? \"  (e.g., Scale 1-10; mild, moderate, or severe)    - MILD (1-3): doesn't interfere with normal activities, abdomen soft and not tender to touch     - MODERATE (4-7): interferes with normal activities or awakens from sleep, tender to touch     - SEVERE (8-10): excruciating pain, doubled over, unable to do any normal activities        Moderate    7. ORAL INTAKE: If vomiting, \"Have you been able to drink liquids? \" \"How much fluids have you had in the past 24 hours? \"      Yes    8. HYDRATION: \"Any signs of dehydration? \" (e.g., dry mouth [not just dry lips], too weak to stand, dizziness, new weight loss) \"When did you last urinate? \"      No    9. EXPOSURE: \"Have you traveled to a foreign country recently? \" \"Have you been exposed to anyone with diarrhea? \" \"Could you have eaten any food that was spoiled? \"      No    10. ANTIBIOTIC USE: \"Are you taking antibiotics now or have you taken antibiotics in the past 2 months? \"        Yes, amoxicillin    11. OTHER SYMPTOMS: \"Do you have any other symptoms? \" (e.g., fever, blood in stool)        No    12. PREGNANCY: \"Is there any chance you are pregnant? \" \"When was your last menstrual period? \"        N/A    Protocols used: [id number]

## 2021-02-07 DIAGNOSIS — M48.061 NEURAL FORAMINAL STENOSIS OF LUMBAR SPINE: ICD-10-CM

## 2021-02-07 DIAGNOSIS — M48.061 FORAMINAL STENOSIS OF LUMBAR REGION: ICD-10-CM

## 2021-02-07 DIAGNOSIS — M48.061 SPINAL STENOSIS OF LUMBAR REGION WITHOUT NEUROGENIC CLAUDICATION: ICD-10-CM

## 2021-02-10 RX ORDER — CELECOXIB 100 MG/1
100 CAPSULE ORAL 2 TIMES DAILY
Qty: 180 CAPSULE | Refills: 1 | Status: SHIPPED | OUTPATIENT
Start: 2021-02-10 | End: 2021-04-23 | Stop reason: SDUPTHER

## 2021-02-10 NOTE — TELEPHONE ENCOUNTER
LOV 7/2/20  RTO 6 months; Given to scheduling for F/U  [facility] 7/2/20    Health Maintenance   Topic Date Due    COVID-19 Vaccine (1 of 2) [birth year]19/1970    Shingles Vaccine (1 of 2) 09/19/2004    Colon cancer screen colonoscopy  09/19/2004    DEXA (modify frequency per FRAX score)  09/19/2009    Breast cancer screen  06/12/2015    Annual Wellness Visit (AWV)  11/10/2019    Potassium monitoring  04/03/2020    Creatinine monitoring  04/03/2020    Flu vaccine (1) 09/01/2020    Pneumococcal 65+ years Vaccine (1 of 1 - PPSV23) 04/13/2021 (Originally 9/19/2019)    Lipid screen  04/03/2024    DTaP/Tdap/Td vaccine (2 - Td) 11/12/2029    Hepatitis A vaccine  Aged Out    Hepatitis B vaccine  Aged Out    Hib vaccine  Aged Out    Meningococcal (ACWY) vaccine  Aged Out    Hepatitis C screen  Discontinued             (applicable per patient's age: Cancer Screenings, Depression Screening, Fall Risk Screening, Immunizations)    Hemoglobin A1C (%)   Date Value   08/22/2018 5.4     LDL Calculated (mg/dL)   Date Value   04/03/2019 212 (A)     BUN (mg/dL)   Date Value   04/03/2019 15      (goal A1C is < 7)   (goal LDL is <100) need 30-50% reduction from baseline     BP Readings from Last 3 Encounters:   08/05/20 134/84   07/02/20 132/84   02/04/20 (!) 153/85    (goal /80)      All Future Testing planned in [facility]:  Lab Frequency Next Occurrence   EMG Once 07/03/2020   Comprehensive Metabolic Panel, Fasting Once 07/02/2020   CBC Once 07/02/2020   Lipid Panel Once 07/02/2020   TSH with Reflex Once 07/02/2020   Urinalysis Once 07/02/2020       Next Visit Date:  No future appointments.          Patient Active Problem List:     Essential hypertension     Medication therapy management recommendation refused by patient     Mixed hyperlipidemia     Spinal stenosis of lumbar region without neurogenic claudication     Shoulder arthritis     Lumbar back pain     Foraminal stenosis of lumbar region     Neural foraminal stenosis of lumbar spine     Osteoarthritis of right glenohumeral joint     Morbidly obese ([address]

## 2021-04-02 ENCOUNTER — TELEPHONE (OUTPATIENT)
Dept: FAMILY MEDICINE CLINIC | Age: 66
End: 2021-04-02

## 2021-04-23 ENCOUNTER — OFFICE VISIT (OUTPATIENT)
Dept: FAMILY MEDICINE CLINIC | Age: 66
End: 2021-04-23
Payer: MEDICARE

## 2021-04-23 ENCOUNTER — HOSPITAL ENCOUNTER (OUTPATIENT)
Age: 66
Setting detail: SPECIMEN
Discharge: HOME | End: 2021-04-23
Payer: MEDICARE

## 2021-04-23 VITALS
TEMPERATURE: 98.1 F | HEART RATE: 81 BPM | RESPIRATION RATE: 18 BRPM | WEIGHT: 242 LBS | OXYGEN SATURATION: 98 % | BODY MASS INDEX: 36.8 KG/M2 | DIASTOLIC BLOOD PRESSURE: 82 MMHG | SYSTOLIC BLOOD PRESSURE: 124 MMHG

## 2021-04-23 DIAGNOSIS — M54.42 ACUTE BILATERAL LOW BACK PAIN WITH BILATERAL SCIATICA: Primary | ICD-10-CM

## 2021-04-23 DIAGNOSIS — E66.01 CLASS 2 SEVERE OBESITY DUE TO EXCESS CALORIES WITH SERIOUS COMORBIDITY AND BODY MASS INDEX (BMI) OF 36.0 TO 36.9 IN ADULT (HCC): ICD-10-CM

## 2021-04-23 DIAGNOSIS — I10 ESSENTIAL HYPERTENSION: ICD-10-CM

## 2021-04-23 DIAGNOSIS — R53.83 FATIGUE, UNSPECIFIED TYPE: ICD-10-CM

## 2021-04-23 DIAGNOSIS — E78.2 MIXED HYPERLIPIDEMIA: ICD-10-CM

## 2021-04-23 DIAGNOSIS — M48.061 SPINAL STENOSIS OF LUMBAR REGION WITHOUT NEUROGENIC CLAUDICATION: ICD-10-CM

## 2021-04-23 DIAGNOSIS — M54.41 ACUTE BILATERAL LOW BACK PAIN WITH BILATERAL SCIATICA: Primary | ICD-10-CM

## 2021-04-23 LAB
ABSOLUTE EOS #: 0.1 K/UL (ref 0–0.44)
ABSOLUTE IMMATURE GRANULOCYTE: 0.05 K/UL (ref 0–0.3)
ABSOLUTE LYMPH #: 1.7 K/UL (ref 1.1–3.7)
ABSOLUTE MONO #: 0.63 K/UL (ref 0.1–1.2)
ALBUMIN SERPL-MCNC: 4.5 G/DL (ref 3.5–5.2)
ALBUMIN/GLOBULIN RATIO: 1.1 (ref 1–2.5)
ALP BLD-CCNC: 77 U/L (ref 35–104)
ALT SERPL-CCNC: 22 U/L (ref 5–33)
ANION GAP SERPL CALCULATED.3IONS-SCNC: 15 MMOL/L (ref 9–17)
AST SERPL-CCNC: 35 U/L
BASOPHILS # BLD: 0 % (ref 0–2)
BASOPHILS ABSOLUTE: 0.03 K/UL (ref 0–0.2)
BILIRUB SERPL-MCNC: 0.59 MG/DL (ref 0.3–1.2)
BILIRUBIN URINE: NEGATIVE
BUN BLDV-MCNC: 19 MG/DL (ref 8–23)
BUN/CREAT BLD: ABNORMAL (ref 9–20)
C-REACTIVE PROTEIN: 7.8 MG/L (ref 0–5)
CALCIUM SERPL-MCNC: 9.8 MG/DL (ref 8.6–10.4)
CHLORIDE BLD-SCNC: 102 MMOL/L (ref 98–107)
CHOLESTEROL/HDL RATIO: 4.4
CHOLESTEROL: 288 MG/DL
CO2: 24 MMOL/L (ref 20–31)
COLOR: YELLOW
COMMENT UA: NORMAL
CREAT SERPL-MCNC: 0.49 MG/DL (ref 0.5–0.9)
DIFFERENTIAL TYPE: ABNORMAL
EOSINOPHILS RELATIVE PERCENT: 1 % (ref 1–4)
FOLATE: >20 NG/ML
GFR AFRICAN AMERICAN: >60 ML/MIN
GFR NON-AFRICAN AMERICAN: >60 ML/MIN
GFR SERPL CREATININE-BSD FRML MDRD: ABNORMAL ML/MIN/{1.73_M2}
GFR SERPL CREATININE-BSD FRML MDRD: ABNORMAL ML/MIN/{1.73_M2}
GLUCOSE FASTING: 96 MG/DL (ref 70–99)
GLUCOSE URINE: NEGATIVE
HCT VFR BLD CALC: 48.3 % (ref 36.3–47.1)
HDLC SERPL-MCNC: 66 MG/DL
HEMOGLOBIN: 15.6 G/DL (ref 11.9–15.1)
IMMATURE GRANULOCYTES: 1 %
KETONES, URINE: NEGATIVE
LDL CHOLESTEROL: 203 MG/DL (ref 0–130)
LEUKOCYTE ESTERASE, URINE: NEGATIVE
LYMPHOCYTES # BLD: 19 % (ref 24–43)
MCH RBC QN AUTO: 30.2 PG (ref 25.2–33.5)
MCHC RBC AUTO-ENTMCNC: 32.3 G/DL (ref 28.4–34.8)
MCV RBC AUTO: 93.6 FL (ref 82.6–102.9)
MONOCYTES # BLD: 7 % (ref 3–12)
NITRITE, URINE: NEGATIVE
NRBC AUTOMATED: 0 PER 100 WBC
PDW BLD-RTO: 13.2 % (ref 11.8–14.4)
PH UA: 6 (ref 5–8)
PLATELET # BLD: 369 K/UL (ref 138–453)
PLATELET ESTIMATE: ABNORMAL
PMV BLD AUTO: 12.7 FL (ref 8.1–13.5)
POTASSIUM SERPL-SCNC: 3.6 MMOL/L (ref 3.7–5.3)
PROTEIN UA: NEGATIVE
RBC # BLD: 5.16 M/UL (ref 3.95–5.11)
RBC # BLD: ABNORMAL 10*6/UL
RHEUMATOID FACTOR: <10 IU/ML
SEDIMENTATION RATE, ERYTHROCYTE: 12 MM (ref 0–30)
SEG NEUTROPHILS: 72 % (ref 36–65)
SEGMENTED NEUTROPHILS ABSOLUTE COUNT: 6.32 K/UL (ref 1.5–8.1)
SODIUM BLD-SCNC: 141 MMOL/L (ref 135–144)
SPECIFIC GRAVITY UA: 1.02 (ref 1–1.03)
TOTAL PROTEIN: 8.5 G/DL (ref 6.4–8.3)
TRIGL SERPL-MCNC: 94 MG/DL
TURBIDITY: CLEAR
URINE HGB: NEGATIVE
UROBILINOGEN, URINE: NORMAL
VITAMIN B-12: 1504 PG/ML (ref 232–1245)
VITAMIN D 25-HYDROXY: 43.8 NG/ML (ref 30–100)
VLDLC SERPL CALC-MCNC: ABNORMAL MG/DL (ref 1–30)
WBC # BLD: 8.8 K/UL (ref 3.5–11.3)
WBC # BLD: ABNORMAL 10*3/UL

## 2021-04-23 PROCEDURE — G8427 DOCREV CUR MEDS BY ELIG CLIN: HCPCS | Performed by: NURSE PRACTITIONER

## 2021-04-23 PROCEDURE — 3017F COLORECTAL CA SCREEN DOC REV: CPT | Performed by: NURSE PRACTITIONER

## 2021-04-23 PROCEDURE — 1090F PRES/ABSN URINE INCON ASSESS: CPT | Performed by: NURSE PRACTITIONER

## 2021-04-23 PROCEDURE — G8400 PT W/DXA NO RESULTS DOC: HCPCS | Performed by: NURSE PRACTITIONER

## 2021-04-23 PROCEDURE — G8417 CALC BMI ABV UP PARAM F/U: HCPCS | Performed by: NURSE PRACTITIONER

## 2021-04-23 PROCEDURE — 4040F PNEUMOC VAC/ADMIN/RCVD: CPT | Performed by: NURSE PRACTITIONER

## 2021-04-23 PROCEDURE — 1036F TOBACCO NON-USER: CPT | Performed by: NURSE PRACTITIONER

## 2021-04-23 PROCEDURE — 99215 OFFICE O/P EST HI 40 MIN: CPT | Performed by: NURSE PRACTITIONER

## 2021-04-23 PROCEDURE — 1123F ACP DISCUSS/DSCN MKR DOCD: CPT | Performed by: NURSE PRACTITIONER

## 2021-04-23 RX ORDER — DICYCLOMINE HYDROCHLORIDE 10 MG/1
10 CAPSULE ORAL DAILY PRN
Qty: 60 CAPSULE | Refills: 5 | Status: SHIPPED | OUTPATIENT
Start: 2021-04-23 | End: 2022-10-10

## 2021-04-23 RX ORDER — TIZANIDINE 4 MG/1
TABLET ORAL
Qty: 180 TABLET | Refills: 1 | Status: SHIPPED | OUTPATIENT
Start: 2021-04-23 | End: 2022-10-10

## 2021-04-23 RX ORDER — CELECOXIB 100 MG/1
100 CAPSULE ORAL 2 TIMES DAILY
Qty: 180 CAPSULE | Refills: 1 | Status: SHIPPED | OUTPATIENT
Start: 2021-04-23 | End: 2021-08-11 | Stop reason: ALTCHOICE

## 2021-04-23 ASSESSMENT — ENCOUNTER SYMPTOMS
VOMITING: 0
SORE THROAT: 0
ABDOMINAL PAIN: 0
SHORTNESS OF BREATH: 0
CHEST TIGHTNESS: 0
CONSTIPATION: 0
DIARRHEA: 0
BACK PAIN: 1
NAUSEA: 0
WHEEZING: 0
SINUS PRESSURE: 0
RHINORRHEA: 0
COUGH: 0
TROUBLE SWALLOWING: 0

## 2021-04-23 ASSESSMENT — PATIENT HEALTH QUESTIONNAIRE - PHQ9
SUM OF ALL RESPONSES TO PHQ QUESTIONS 1-9: 0
1. LITTLE INTEREST OR PLEASURE IN DOING THINGS: 0

## 2021-04-23 NOTE — PATIENT INSTRUCTIONS
Patient Education        pregabalin  Pronunciation:  pre SHAUN a monika  Brand:  Lyrica, Lyrica CR  What is the most important information I should know about pregabalin? Pregabalin can cause a severe allergic reaction. Stop taking this medicine and seek emergency medical help if you have hives or blisters on your skin, trouble breathing, or swelling in your face, mouth, or throat. Some people have thoughts about suicide while taking pregabalin. Stay alert to changes in your mood or symptoms. Report any new or worsening symptoms to your doctor. If you have diabetes or heart problems, call your doctor if you have weight gain or swelling in your hands or feet while taking pregabalin. Do not stop using pregabalin suddenly, even if you feel fine. Stopping suddenly may cause withdrawal symptoms. What is pregabalin? Pregabalin is an anti-epileptic drug, also called an anticonvulsant. It works by slowing down impulses in the brain that cause seizures. Pregabalin also affects chemicals in the brain that send pain signals across the nervous system. Pregabalin is used to treat pain caused by fibromyalgia, or nerve pain in people with diabetes (diabetic neuropathy), herpes zoster (post-herpetic neuralgia), or spinal cord injury. Pregabalin is also used with other medications to treat partial onset seizures in adults and children who are at least 2 month old. Pregabalin may also be used for purposes not listed in this medication guide. What should I discuss with my healthcare provider before taking pregabalin? You should not use pregabalin if you are allergic to it. Tell your doctor if you have ever had:  · lung disease, such as chronic obstructive pulmonary disease (COPD);   · a mood disorder, depression, or suicidal thoughts;  · heart problems (especially congestive heart failure);  · a bleeding disorder, or low levels of platelets in your blood;  · kidney disease (or if you are on dialysis);  · diabetes (unless you worse. Do not stop using pregabalin suddenly, even if you feel fine. Stopping suddenly may cause increased seizures or unpleasant withdrawal symptoms. Follow your doctor's instructions about tapering your dose for at least 1 week before stopping completely. In case of emergency, wear or carry medical identification to let others know you take seizure medication. Store at room temperature away from moisture, heat, and light. What happens if I miss a dose? Take the medicine as soon as you can, but skip the missed dose if it is almost time for your next dose. Do not take two doses at one time. What happens if I overdose? Seek emergency medical attention or call the Poison Help line at 1-916.840.2132. What should I avoid while taking pregabalin? Avoid drinking alcohol. It may increase certain side effects of pregabalin. Avoid driving or hazardous activity until you know how this medicine will affect you. Your reactions could be impaired. What are the possible side effects of pregabalin? Pregabalin can cause a severe allergic reaction. Stop taking this medicine and get emergency medical help if you have: hives or blisters on your skin; difficult breathing; swelling of your face, lips, tongue, or throat. Report any new or worsening symptoms to your doctor, such as: mood or behavior changes, depression, anxiety, panic attacks, trouble sleeping, or if you feel impulsive, irritable, agitated, hostile, aggressive, restless, hyperactive (mentally or physically), or have thoughts about suicide or hurting yourself.   Call your doctor at once if you have:  · weak or shallow breathing;  · blue-colored skin, lips, fingers, and toes;  · confusion, extreme drowsiness or weakness;  · vision problems;  · skin sores (if you have diabetes);  · easy bruising, unusual bleeding;  · swelling in your hands or feet, rapid weight gain (especially if you have diabetes or heart problems); or  · unexplained muscle pain, tenderness, or weakness (especially if you also have fever or don't feel well). Pregabalin can cause life-threatening breathing problems. A person caring for you should seek emergency medical attention if you have slow breathing with long pauses, blue colored lips, or if you are hard to wake up. Breathing problems may be more likely in older adults or in people with COPD. If you have diabetes,  tell your doctor right away if you have any new sores or other skin problems. Common side effects may include:  · dizziness, drowsiness;  · swelling in your hands and feet;  · trouble concentrating;  · increased appetite;  · weight gain;  · dry mouth; or  · blurred vision. This is not a complete list of side effects and others may occur. Call your doctor for medical advice about side effects. You may report side effects to FDA at 5-740-FDA-5452. What other drugs will affect pregabalin? Using pregabalin with other drugs that slow your breathing can cause dangerous side effects or death. Ask your doctor before using opioid medication, a sleeping pill, cold or allergy medicine, a muscle relaxer, or medicine for anxiety or seizures. Tell your doctor about all your other medicines, especially:  · oral diabetes medicine --pioglitazone, rosiglitazone; or  · an ACE inhibitor --benazepril, captopril, enalapril, fosinopril, lisinopril, moexipril, perindopril, quinapril, ramipril, or trandolapril. This list is not complete. Other drugs may affect pregabalin, including prescription and over-the-counter medicines, vitamins, and herbal products. Not all possible drug interactions are listed here. Where can I get more information? Your pharmacist can provide more information about pregabalin. Remember, keep this and all other medicines out of the reach of children, never share your medicines with others, and use this medication only for the indication prescribed.    Every effort has been made to ensure that the information provided by [employer] [facility] is accurate, up-to-date, and complete, but no guarantee is made to that effect. Drug information contained herein may be time sensitive. [facility] information has been compiled for use by healthcare practitioners and consumers in the [place] and therefore [facility] does not warrant that uses outside of the [place] are appropriate, unless specifically indicated otherwise. [facility]'s drug information does not endorse drugs, diagnose patients or recommend therapy. [facility] drug information is an informational resource designed to assist licensed healthcare practitioners in caring for their patients and/or to serve consumers viewing this service as a supplement to, and not a substitute for, the expertise, skill, knowledge and judgment of healthcare practitioners. The absence of a warning for a given drug or drug combination in no way should be construed to indicate that the drug or drug combination is safe, effective or appropriate for any given patient. [facility] does not assume any responsibility for any aspect of healthcare administered with the aid of information [facility] provides. The information contained herein is not intended to cover all possible uses, directions, precautions, warnings, drug interactions, allergic reactions, or adverse effects. If you have questions about the drugs you are taking, check with your doctor, nurse or pharmacist.  Copyright 6440-3151 [address]. Version: 9.01. Revision date: 12/26/2019. Care instructions adapted under license by [facility] ([facility]). If you have questions about a medical condition or this instruction, always ask your healthcare professional. [place] [address] any warranty or liability for your use of this information. Patient Education        duloxetine  Pronunciation:  ketty PENNY 25 e teen  Brand:  Caro Jones  What is the most important information I should know about duloxetine?   Do not take duloxetine within 5 days before or 14 days after you have used an MAO inhibitor, such as isocarboxazid, linezolid, methylene blue injection, phenelzine, rasagiline, selegiline, or tranylcypromine. Some young people have thoughts about suicide when first taking an antidepressant. Stay alert to changes in your mood or symptoms. Report any new or worsening symptoms to your doctor. Do not stop using duloxetine without first talking to your doctor. What is duloxetine? Duloxetine is a selective serotonin and norepinephrine reuptake inhibitor antidepressant (SSNRI). Duloxetine is used to treat major depressive disorder in adults. Duloxetine is also used to treat general anxiety disorder in adults and children at least 9years old. Duloxetine is also used in adults to treat nerve pain caused by diabetes (diabetic neuropathy), or chronic muscle or joint pain (such as low back pain and osteoarthritis pain). Duloxetine is also used to treat fibromyalgia (a chronic pain disorder) in adults and children at least 15years old. Duloxetine may also be used for purposes not listed in this medication guide. What should I discuss with my healthcare provider before taking duloxetine? You should not use duloxetine if you are allergic to it. Do not take duloxetine within 5 days before or 14 days after you have used an MAO inhibitor, such as isocarboxazid, linezolid, methylene blue injection, phenelzine, rasagiline, selegiline, or tranylcypromine. A dangerous drug interaction could occur. Be sure your doctor knows if you also take stimulant medicine, opioid medicine, herbal products, or medicine for depression, mental illness, Parkinson's disease, migraine headaches, serious infections, or prevention of nausea and vomiting. These medicines may interact with duloxetine and cause a serious condition called serotonin syndrome. Duloxetine is not approved for use by anyone younger than 9years old.   Tell your doctor if you have ever had:  · heart problems, high blood pressure;  · liver or kidney disease;  · slow digestion;  · a seizure;  · bleeding problems;  · narrow-angle glaucoma;  · bipolar disorder (manic depression);  · drug addiction or suicidal thoughts; or  · if you drink large amounts of alcohol. Some young people have thoughts about suicide when first taking an antidepressant. Your doctor should check your progress at regular visits. Your family or other caregivers should also be alert to changes in your mood or symptoms. Ask your doctor about taking duloxetine if you are pregnant. Taking this medicine during late pregnancy may cause excessive bleeding during childbirth, or serious medical complications in the baby. However, if not treated during pregnancy, conditions such as depression or fibromyalgia may cause complications in the baby or the mother. Tell your doctor right away if you become pregnant while taking duloxetine. Do not start or stop taking this medicine without your doctor's advice. Your name may be listed on a pregnancy registry to track the effects of duloxetine on the baby. If you are breastfeeding, tell your doctor if you notice drowsiness, feeding problems, and slow weight gain in the nursing baby. How should I take duloxetine? Follow all directions on your prescription label and read all medication guides or instruction sheets. Your doctor may occasionally change your dose. Use the medicine exactly as directed. Taking duloxetine in higher doses or more often than prescribed will not make it more effective, and may increase side effects. Swallow the capsule whole and do not crush, chew, break, or open it. You may take duloxetine with or without food. It may take up to 4 weeks before your symptoms improve. Keep using the medication as directed and tell your doctor if your symptoms do not improve. Your blood pressure will need to be checked often.   Do not stop using duloxetine suddenly, or you could have unpleasant symptoms (such as dizziness, vomiting, agitation, sweating, confusion, numbness, tingling, or electric shock feelings). Ask your doctor how to safely stop using this medicine. Store at room temperature away from moisture and heat. What happens if I miss a dose? Take the medicine as soon as you can, but skip the missed dose if it is almost time for your next dose. Do not take two doses at one time. What happens if I overdose? Seek emergency medical attention or call the Poison Help line at 1-476.852.4238. Overdose symptoms may include vomiting, dizziness or drowsiness, seizures, fast heartbeats, fainting, or coma. What should I avoid while taking duloxetine? Avoid driving or hazardous activity until you know how this medicine will affect you. Your reactions could be impaired. Dizziness or fainting can cause falls, accidents, or severe injuries. Avoid getting up too fast from a sitting or lying position, or you may feel dizzy. Avoid drinking alcohol. It may increase your risk of liver damage, especially if you take Drizalma. What are the possible side effects of duloxetine? Get emergency medical help if you have signs of an allergic reaction (hives, difficult breathing, swelling in your face or throat) or a severe skin reaction (fever, sore throat, burning eyes, skin pain, red or purple skin rash with blistering and peeling). Report any new or worsening symptoms to your doctor, such as: mood or behavior changes, anxiety, panic attacks, trouble sleeping, or if you feel impulsive, irritable, agitated, hostile, aggressive, restless, hyperactive (mentally or physically), more depressed, or have thoughts about suicide or hurting yourself.   Call your doctor at once if you have:  · pounding heartbeats or fluttering in your chest;  · a light-headed feeling, like you might pass out;  · easy bruising, unusual bleeding;  · vision changes;  · painful or difficult urination;  · impotence, sexual problems;  · liver problems --right-sided upper stomach pain, itching, dark urine, jaundice (yellowing of the skin or eyes);  · low levels of sodium in the body --headache, confusion, slurred speech, severe weakness, vomiting, loss of coordination, feeling unsteady; or  · manic episodes --racing thoughts, increased energy, decreased need for sleep, risk-taking behavior, being agitated or talkative. Seek medical attention right away if you have symptoms of serotonin syndrome, such as: agitation, hallucinations, fever, sweating, shivering, fast heart rate, muscle stiffness, twitching, loss of coordination, nausea, vomiting, or diarrhea. Common side effects may include:  · drowsiness;  · nausea, constipation, loss of appetite;  · dry mouth; or  · increased sweating. This is not a complete list of side effects and others may occur. Call your doctor for medical advice about side effects. You may report side effects to FDA at 3-624-FDA-3604. What other drugs will affect duloxetine? Sometimes it is not safe to use certain medications at the same time. Some drugs can affect your blood levels of other drugs you take, which may increase side effects or make the medications less effective. Ask your doctor before taking a nonsteroidal anti-inflammatory drug (NSAID) such as aspirin, ibuprofen (Advil, Motrin), naproxen (Aleve), celecoxib (Celebrex), diclofenac, indomethacin, meloxicam, and others. Using an NSAID with duloxetine may cause you to bruise or bleed easily. Many drugs can affect duloxetine. This includes prescription and over-the-counter medicines, vitamins, and herbal products. Not all possible interactions are listed here. Tell your doctor about all your current medicines and any medicine you start or stop using. Where can I get more information? Your pharmacist can provide more information about duloxetine.   Remember, keep this and all other medicines out of the reach of children, never share your medicines

## 2021-04-23 NOTE — PROGRESS NOTES
female. She presents the office today to establish care with new provider within the office moving forward. Patient utilizes our office every 6 months for routine checkups in regards to her comorbidities. Patient takes her medications as prescribed with no side effects. Patient denies any chest pain, shortness of breath, recent upper respiratory infections, or fevers.     Care team includes:  Pain management. She follows on a monthly basis and is prescribed her Percocet. We provide patient her prescription for her muscle relaxer. Pain management is aware.     Patient has significant history of chronic lumbar lower back pain. Approximately 10 years ago she had a laminectomy. Immediately following surgery she had severe weakness and nerve pain to her bilateral lower extremities. Patient refused to have any additional surgery done to repair any type of damage because during surgery. Patient just recently agreed to do an MRI of the lumbar spine. This was done November 2018. This imaging was reviewed and discussed in further detail with patient. Discussed with patient potentially ordering an EMG, to assess muscle strength and nerve damage. Patient says she will think about it, as it was brought to her attention in the past, however she has never completed due to fear of worsening of her symptoms.     Patient refuses any type of health maintenance in regards to screening for cancers. She states she had a colonoscopy once. At that time she was told that she had a significant area of stricturing with multiple diverticuli noted. Patient is never returned. Patient also has a history of her gallbladder being removed. Since that time patient has Bentyl at home to take as needed for gas pain. Discussed with patient that she easily could have ongoing issues with diverticulitis due to the information given today.   Patient refusing any additional colonoscopies, FIT testing, or Cologuard's to assess for blood in her stool. Review of Systems  Review of Systems   Constitutional: Positive for activity change and fatigue. Negative for appetite change, chills and fever. HENT: Negative for congestion, postnasal drip, rhinorrhea, sinus pressure, sore throat and trouble swallowing. Respiratory: Negative for cough, chest tightness, shortness of breath and wheezing. Cardiovascular: Negative for chest pain, palpitations and leg swelling. Gastrointestinal: Negative for abdominal pain, constipation, diarrhea, nausea and vomiting. Genitourinary: Positive for frequency and urgency. Negative for difficulty urinating, dysuria and hematuria. Mixed incontinence  Wears pad    Musculoskeletal: Positive for arthralgias, back pain, gait problem and myalgias. Chronic lumbar back pain   Skin: Negative for rash. Neurological: Positive for weakness and numbness (Lateral lower extremities). Negative for dizziness, syncope, light-headedness and headaches. Psychiatric/Behavioral: Positive for sleep disturbance. Negative for agitation. The patient is not nervous/anxious. Physical exam   Physical Exam  Vitals signs and nursing note reviewed. Constitutional:       Appearance: Normal appearance. She is well-developed and well-groomed. She is morbidly obese. She is not ill-appearing or toxic-appearing. Cardiovascular:      Rate and Rhythm: Normal rate and regular rhythm. Heart sounds: Normal heart sounds, S1 normal and S2 normal. No murmur. Pulmonary:      Effort: Pulmonary effort is normal. No respiratory distress. Breath sounds: Normal breath sounds and air entry. No wheezing or rales. Musculoskeletal:      Lumbar back: She exhibits decreased range of motion, tenderness, pain and spasm. She exhibits no swelling. Right lower le+ Edema present. Left lower le+ Edema present. Skin:     General: Skin is warm and dry.       Capillary Refill: Capillary refill takes less

## 2021-04-26 LAB — ANTI-NUCLEAR ANTIBODY (ANA): NEGATIVE

## 2021-04-27 ENCOUNTER — PATIENT MESSAGE (OUTPATIENT)
Dept: FAMILY MEDICINE CLINIC | Age: 66
End: 2021-04-27

## 2021-04-27 DIAGNOSIS — M54.42 ACUTE BILATERAL LOW BACK PAIN WITH BILATERAL SCIATICA: Primary | ICD-10-CM

## 2021-04-27 DIAGNOSIS — R79.82 ELEVATED C-REACTIVE PROTEIN (CRP): ICD-10-CM

## 2021-04-27 DIAGNOSIS — M19.011 OSTEOARTHRITIS OF RIGHT GLENOHUMERAL JOINT: Primary | ICD-10-CM

## 2021-04-27 DIAGNOSIS — M54.41 ACUTE BILATERAL LOW BACK PAIN WITH BILATERAL SCIATICA: Primary | ICD-10-CM

## 2021-04-27 LAB
ANTICARDIOLIPIN IGA ANTIBODY: 6.5 APL (ref 0–14)
ANTICARDIOLIPIN IGG ANTIBODY: 4.6 GPL (ref 0–10)
CARDIOLIPIN AB IGM: 1.1 MPL (ref 0–10)
DILUTE RUSSELL VIPER VENOM TIME: NORMAL
INR BLD: 0.9
LUPUS ANTICOAG: NORMAL
PARTIAL THROMBOPLASTIN TIME: 25.8 SEC (ref 20.5–30.5)
PROTHROMBIN TIME: 9.9 SEC (ref 9.1–12.3)

## 2021-04-27 RX ORDER — DULOXETIN HYDROCHLORIDE 30 MG/1
30 CAPSULE, DELAYED RELEASE ORAL DAILY
Qty: 30 CAPSULE | Refills: 2 | Status: SHIPPED | OUTPATIENT
Start: 2021-04-27 | End: 2021-07-07 | Stop reason: SDUPTHER

## 2021-04-27 NOTE — TELEPHONE ENCOUNTER
From: Mickie Coyle  To: RENAN Birch CNP  Sent: 4/27/2021 4:14 PM EDT  Subject: Prescription Question    I've decided to try the nerve pain medicine your grandmother is on. If you would fax over the script to [facility]?

## 2021-05-19 DIAGNOSIS — I10 ESSENTIAL HYPERTENSION: Chronic | ICD-10-CM

## 2021-05-20 NOTE — TELEPHONE ENCOUNTER
hyperlipidemia     Spinal stenosis of lumbar region without neurogenic claudication     Shoulder arthritis     Lumbar back pain     Foraminal stenosis of lumbar region     Neural foraminal stenosis of lumbar spine     Osteoarthritis of right glenohumeral joint     Morbidly obese ([address]

## 2021-05-21 RX ORDER — SPIRONOLACTONE AND HYDROCHLOROTHIAZIDE 25; 25 MG/1; MG/1
1 TABLET ORAL DAILY
Qty: 90 TABLET | Refills: 1 | Status: SHIPPED | OUTPATIENT
Start: 2021-05-21 | End: 2021-11-05

## 2021-07-07 DIAGNOSIS — M54.42 ACUTE BILATERAL LOW BACK PAIN WITH BILATERAL SCIATICA: ICD-10-CM

## 2021-07-07 DIAGNOSIS — M54.41 ACUTE BILATERAL LOW BACK PAIN WITH BILATERAL SCIATICA: ICD-10-CM

## 2021-07-07 RX ORDER — DULOXETIN HYDROCHLORIDE 30 MG/1
30 CAPSULE, DELAYED RELEASE ORAL DAILY
Qty: 30 CAPSULE | Refills: 2 | Status: SHIPPED | OUTPATIENT
Start: 2021-07-07 | End: 2021-07-23 | Stop reason: DRUGHIGH

## 2021-07-07 NOTE — TELEPHONE ENCOUNTER
LOV 4-23-21  LRF 4-27-21    Health Maintenance   Topic Date Due    Colon cancer screen colonoscopy  Never done    Shingles Vaccine (1 of 2) Never done    DEXA (modify frequency per FRAX score)  Never done    Breast cancer screen  06/12/2015    Pneumococcal 65+ years Vaccine (1 of 1 - PPSV23) Never done   [employer] Visit (AWV)  Never done    Flu vaccine (1) 09/01/2021    Potassium monitoring  04/23/2022    Creatinine monitoring  04/23/2022    Lipid screen  04/23/2026    DTaP/Tdap/Td vaccine (2 - Td or Tdap) 11/12/2029    COVID-19 Vaccine  Completed    Hepatitis A vaccine  Aged Out    Hepatitis B vaccine  Aged Out    Hib vaccine  Aged Out    Meningococcal (ACWY) vaccine  Aged Out    Hepatitis C screen  Discontinued             (applicable per patient's age: Cancer Screenings, Depression Screening, Fall Risk Screening, Immunizations)    Hemoglobin A1C (%)   Date Value   08/22/2018 5.4     LDL Cholesterol (mg/dL)   Date Value   04/23/2021 203 (H)     LDL Calculated (mg/dL)   Date Value   04/03/2019 212 (A)     AST (U/L)   Date Value   04/23/2021 35 (H)     ALT (U/L)   Date Value   04/23/2021 22     BUN (mg/dL)   Date Value   04/23/2021 19      (goal A1C is < 7)   (goal LDL is <100) need 30-50% reduction from baseline     BP Readings from Last 3 Encounters:   04/23/21 124/82   08/05/20 134/84   07/02/20 132/84    (goal /80)      All Future Testing planned in [facility]:  Lab Frequency Next Occurrence   CBC With Auto Differential Once 07/27/2021   C-Reactive Protein Once 07/27/2021       Next Visit Date:  Future Appointments   Date Time Provider Per Mora   7/23/2021 10:30 AM RENAN Davies - [facility]   9/1/2021  1:15 PM Clay Lutz MD [facility]            Patient Active Problem List:     Essential hypertension     Medication therapy management recommendation refused by patient     Mixed hyperlipidemia     Spinal stenosis of lumbar region without neurogenic claudication     Shoulder arthritis     Lumbar back pain     Foraminal stenosis of lumbar region     Neural foraminal stenosis of lumbar spine     Osteoarthritis of right glenohumeral joint     Morbidly obese ([address]

## 2021-07-23 ENCOUNTER — OFFICE VISIT (OUTPATIENT)
Dept: FAMILY MEDICINE CLINIC | Age: 66
End: 2021-07-23
Payer: MEDICARE

## 2021-07-23 VITALS
HEART RATE: 74 BPM | TEMPERATURE: 98.1 F | OXYGEN SATURATION: 96 % | SYSTOLIC BLOOD PRESSURE: 138 MMHG | WEIGHT: 249.4 LBS | BODY MASS INDEX: 37.92 KG/M2 | DIASTOLIC BLOOD PRESSURE: 84 MMHG | RESPIRATION RATE: 20 BRPM

## 2021-07-23 DIAGNOSIS — M54.42 ACUTE BILATERAL LOW BACK PAIN WITH BILATERAL SCIATICA: Primary | ICD-10-CM

## 2021-07-23 DIAGNOSIS — M48.061 NEURAL FORAMINAL STENOSIS OF LUMBAR SPINE: ICD-10-CM

## 2021-07-23 DIAGNOSIS — E78.2 MIXED HYPERLIPIDEMIA: ICD-10-CM

## 2021-07-23 DIAGNOSIS — M54.41 ACUTE BILATERAL LOW BACK PAIN WITH BILATERAL SCIATICA: Primary | ICD-10-CM

## 2021-07-23 DIAGNOSIS — I10 ESSENTIAL HYPERTENSION: ICD-10-CM

## 2021-07-23 PROCEDURE — 3017F COLORECTAL CA SCREEN DOC REV: CPT | Performed by: NURSE PRACTITIONER

## 2021-07-23 PROCEDURE — 1090F PRES/ABSN URINE INCON ASSESS: CPT | Performed by: NURSE PRACTITIONER

## 2021-07-23 PROCEDURE — 4040F PNEUMOC VAC/ADMIN/RCVD: CPT | Performed by: NURSE PRACTITIONER

## 2021-07-23 PROCEDURE — 1036F TOBACCO NON-USER: CPT | Performed by: NURSE PRACTITIONER

## 2021-07-23 PROCEDURE — G8400 PT W/DXA NO RESULTS DOC: HCPCS | Performed by: NURSE PRACTITIONER

## 2021-07-23 PROCEDURE — G8427 DOCREV CUR MEDS BY ELIG CLIN: HCPCS | Performed by: NURSE PRACTITIONER

## 2021-07-23 PROCEDURE — G8417 CALC BMI ABV UP PARAM F/U: HCPCS | Performed by: NURSE PRACTITIONER

## 2021-07-23 PROCEDURE — 1123F ACP DISCUSS/DSCN MKR DOCD: CPT | Performed by: NURSE PRACTITIONER

## 2021-07-23 PROCEDURE — 99215 OFFICE O/P EST HI 40 MIN: CPT | Performed by: NURSE PRACTITIONER

## 2021-07-23 RX ORDER — GREEN TEA/HOODIA GORDONII 315-12.5MG
1 CAPSULE ORAL 2 TIMES DAILY
Qty: 60 TABLET | Refills: 0 | Status: SHIPPED | OUTPATIENT
Start: 2021-07-23 | End: 2021-08-22

## 2021-07-23 RX ORDER — DULOXETIN HYDROCHLORIDE 30 MG/1
30 CAPSULE, DELAYED RELEASE ORAL 2 TIMES DAILY
Qty: 60 CAPSULE | Refills: 0 | Status: SHIPPED | OUTPATIENT
Start: 2021-07-23 | End: 2021-08-02 | Stop reason: DRUGHIGH

## 2021-07-23 ASSESSMENT — ENCOUNTER SYMPTOMS
ABDOMINAL PAIN: 0
DIARRHEA: 0
CONSTIPATION: 0
BACK PAIN: 1
SORE THROAT: 0
SINUS PRESSURE: 0
WHEEZING: 0
VOMITING: 0
NAUSEA: 0
CHEST TIGHTNESS: 0
COUGH: 0
TROUBLE SWALLOWING: 0
RHINORRHEA: 0
SHORTNESS OF BREATH: 0

## 2021-07-23 NOTE — PROGRESS NOTES
severe chronic back pain. Also severe numbness and tingling/sciatic pain to bilateral lower extremities. She continues to follow with pain management on a monthly basis. They have discussed with her other options such as surgical intervention, cauterization of nerves, as well as epidural injections. Patient is extremely fearful of any of this occurring due to previous surgical intervention in which it almost led her to be paralyzed. She is going to follow with orthopedic surgeon. Her appointment is scheduled. She is experiencing acute right shoulder pain. In the past patient commented that she tried Mobic and she did not notice any change in her pain. We discussed other options such as Cymbalta. Patient is willing to try as it is known to help with neuropathy pain as well is can also have the benefits of increasing her mood. She does stress to me that she does not feel sad or depressed. Review of Systems  Review of Systems   Constitutional: Positive for activity change and fatigue. Negative for appetite change, chills and fever. HENT: Negative for congestion, postnasal drip, rhinorrhea, sinus pressure, sore throat and trouble swallowing. Respiratory: Negative for cough, chest tightness, shortness of breath and wheezing. Cardiovascular: Negative for chest pain, palpitations and leg swelling. Gastrointestinal: Negative for abdominal pain, constipation, diarrhea, nausea and vomiting. Genitourinary: Positive for frequency and urgency. Negative for difficulty urinating, dysuria and hematuria. Mixed incontinence  Wears pad    Musculoskeletal: Positive for arthralgias, back pain, gait problem and myalgias. Chronic lumbar back pain   Skin: Negative for rash. Neurological: Positive for weakness and numbness (Lateral lower extremities). Negative for dizziness, syncope, light-headedness and headaches. Psychiatric/Behavioral: Positive for sleep disturbance. Negative for agitation. The patient is not nervous/anxious. Physical exam   Physical Exam  Vitals and nursing note reviewed. Constitutional:       Appearance: Normal appearance. She is well-developed and well-groomed. She is morbidly obese. She is not ill-appearing or toxic-appearing. Cardiovascular:      Rate and Rhythm: Normal rate and regular rhythm. Heart sounds: Normal heart sounds, S1 normal and S2 normal. No murmur heard. Pulmonary:      Effort: Pulmonary effort is normal. No respiratory distress. Breath sounds: Normal breath sounds and air entry. No wheezing or rales. Musculoskeletal:      Lumbar back: Spasms and tenderness present. No swelling. Decreased range of motion. Right lower le+ Edema present. Left lower le+ Edema present. Skin:     General: Skin is warm and dry. Capillary Refill: Capillary refill takes less than 2 seconds. Neurological:      Mental Status: She is alert and oriented to person, place, and time. Psychiatric:         Attention and Perception: Attention and perception normal.         Mood and Affect: Mood normal. Affect is flat and tearful. Speech: Speech normal.         Behavior: Behavior normal. Behavior is cooperative. Thought Content: Thought content normal. Thought content does not include suicidal ideation. Thought content does not include suicidal plan. Cognition and Memory: Cognition and memory normal.         Judgment: Judgment normal.           Electronically signed by RENAN Lewis CNP, APRN-CNP on 8/15/2021 at 6:39 PM    Please note that this chart was generated using voice recognition Dragon dictation software. Although every effort was made to ensure the accuracy of this automated transcription, some errors in transcription may have occurred.

## 2021-08-04 ENCOUNTER — PATIENT MESSAGE (OUTPATIENT)
Dept: FAMILY MEDICINE CLINIC | Age: 66
End: 2021-08-04

## 2021-08-04 DIAGNOSIS — M19.011 OSTEOARTHRITIS OF RIGHT GLENOHUMERAL JOINT: Primary | ICD-10-CM

## 2021-08-04 NOTE — TELEPHONE ENCOUNTER
From: Alta Cruz  To: RENAN Stern CNP  Sent: 8/4/2021 2:22 PM EDT  Subject: Non-Urgent Medical Question    I picked up my Voltaren gel and the pharmacist was confused that I would use that while taking Celebrex. She said to double check with you. On that same subject you piqued my interest saying that Mobic prevents inflammation. How would one go about switching over? Also I received a message in my Chart saying Im do for blood work. Didnt we just do blood work?

## 2021-08-11 RX ORDER — MELOXICAM 7.5 MG/1
7.5 TABLET ORAL DAILY
Qty: 30 TABLET | Refills: 1 | Status: SHIPPED | OUTPATIENT
Start: 2021-08-11 | End: 2021-10-13 | Stop reason: ALTCHOICE

## 2021-08-15 RX ORDER — DULOXETIN HYDROCHLORIDE 30 MG/1
30 CAPSULE, DELAYED RELEASE ORAL 2 TIMES DAILY
Qty: 60 CAPSULE | Refills: 0
Start: 2021-08-15 | End: 2021-11-17 | Stop reason: DRUGHIGH

## 2021-08-15 NOTE — ASSESSMENT & PLAN NOTE
Uncontrolled, changes made today: add increase Cymbalta. Patient to let me know when time for refill how symptoms are to base next refill dose.

## 2021-09-01 ENCOUNTER — OFFICE VISIT (OUTPATIENT)
Dept: ORTHOPEDIC SURGERY | Age: 66
End: 2021-09-01
Payer: MEDICARE

## 2021-09-01 VITALS — WEIGHT: 249 LBS | HEIGHT: 68 IN | BODY MASS INDEX: 37.74 KG/M2

## 2021-09-01 DIAGNOSIS — M25.512 LEFT SHOULDER PAIN, UNSPECIFIED CHRONICITY: ICD-10-CM

## 2021-09-01 DIAGNOSIS — M19.011 OSTEOARTHRITIS OF GLENOHUMERAL JOINT, RIGHT: Primary | ICD-10-CM

## 2021-09-01 DIAGNOSIS — R93.6 ABNORMAL FINDINGS ON DIAGNOSTIC IMAGING OF LIMBS: ICD-10-CM

## 2021-09-01 PROCEDURE — 99213 OFFICE O/P EST LOW 20 MIN: CPT | Performed by: ORTHOPAEDIC SURGERY

## 2021-09-01 PROCEDURE — G8417 CALC BMI ABV UP PARAM F/U: HCPCS | Performed by: ORTHOPAEDIC SURGERY

## 2021-09-01 PROCEDURE — 3017F COLORECTAL CA SCREEN DOC REV: CPT | Performed by: ORTHOPAEDIC SURGERY

## 2021-09-01 PROCEDURE — G8400 PT W/DXA NO RESULTS DOC: HCPCS | Performed by: ORTHOPAEDIC SURGERY

## 2021-09-01 PROCEDURE — G8427 DOCREV CUR MEDS BY ELIG CLIN: HCPCS | Performed by: ORTHOPAEDIC SURGERY

## 2021-09-01 PROCEDURE — 1090F PRES/ABSN URINE INCON ASSESS: CPT | Performed by: ORTHOPAEDIC SURGERY

## 2021-09-01 PROCEDURE — 1036F TOBACCO NON-USER: CPT | Performed by: ORTHOPAEDIC SURGERY

## 2021-09-01 PROCEDURE — 4040F PNEUMOC VAC/ADMIN/RCVD: CPT | Performed by: ORTHOPAEDIC SURGERY

## 2021-09-01 PROCEDURE — 1123F ACP DISCUSS/DSCN MKR DOCD: CPT | Performed by: ORTHOPAEDIC SURGERY

## 2021-10-27 ENCOUNTER — NURSE ONLY (OUTPATIENT)
Dept: FAMILY MEDICINE CLINIC | Age: 66
End: 2021-10-27
Payer: MEDICARE

## 2021-10-27 DIAGNOSIS — Z23 NEED FOR INFLUENZA VACCINATION: Primary | ICD-10-CM

## 2021-10-27 PROCEDURE — G0008 ADMIN INFLUENZA VIRUS VAC: HCPCS | Performed by: PEDIATRICS

## 2021-10-27 PROCEDURE — 90694 VACC AIIV4 NO PRSRV 0.5ML IM: CPT | Performed by: PEDIATRICS

## 2021-10-27 NOTE — PROGRESS NOTES
Vaccine Information Sheet, \"Influenza - Inactivated\"  given to Lindsay Obrien, or parent/legal guardian of  Lindsay Obrien and verbalized understanding. Patient responses:    Have you ever had a reaction to a flu vaccine? No  Do you have any current illness? No  Have you ever had [place] Syndrome? No  Do you have a serious allergy to any of the following: Neomycin, Polymyxin, Thimerosal, eggs or egg products? No    Flu vaccine given per order. Please see immunization tab. Risks and benefits explained. Current VIS given.

## 2021-11-05 DIAGNOSIS — I10 ESSENTIAL HYPERTENSION: Chronic | ICD-10-CM

## 2021-11-05 NOTE — TELEPHONE ENCOUNTER
LOV 7/23/21  RTO 3 months; F/U scheduled  [facility] 5/21/21    Health Maintenance   Topic Date Due    Colon cancer screen colonoscopy  Never done    Shingles Vaccine (1 of 2) Never done    DEXA (modify frequency per FRAX score)  Never done    Breast cancer screen  06/12/2015    Pneumococcal 65+ years Vaccine (1 of 1 - PPSV23) Never done   [employer] Visit (AWV)  Never done    COVID-19 Vaccine (3 - Pfizer booster) 09/11/2021    Potassium monitoring  04/23/2022    Creatinine monitoring  04/23/2022    Lipid screen  04/23/2026    DTaP/Tdap/Td vaccine (2 - Td or Tdap) 11/12/2029    Flu vaccine  Completed    Hepatitis A vaccine  Aged Out    Hepatitis B vaccine  Aged Out    Hib vaccine  Aged Out    Meningococcal (ACWY) vaccine  Aged Out    Hepatitis C screen  Discontinued             (applicable per patient's age: Cancer Screenings, Depression Screening, Fall Risk Screening, Immunizations)    Hemoglobin A1C (%)   Date Value   08/22/2018 5.4     LDL Cholesterol (mg/dL)   Date Value   04/23/2021 203 (H)     LDL Calculated (mg/dL)   Date Value   04/03/2019 212 (A)     AST (U/L)   Date Value   04/23/2021 35 (H)     ALT (U/L)   Date Value   04/23/2021 22     BUN (mg/dL)   Date Value   04/23/2021 19      (goal A1C is < 7)   (goal LDL is <100) need 30-50% reduction from baseline     BP Readings from Last 3 Encounters:   07/23/21 138/84   04/23/21 124/82   08/05/20 134/84    (goal /80)      All Future Testing planned in [facility]:  Lab Frequency Next Occurrence   CBC Once 40/17/8776   Basic Metabolic Panel Once 80/46/3310       Next Visit Date:  Future Appointments   Date Time Provider Per Mora   11/17/2021 10:30 AM Amy Aleatha Goltz, APRN - [facility] [place] [facility]            Patient Active Problem List:     Essential hypertension     Medication therapy management recommendation refused by patient     Mixed hyperlipidemia     Spinal stenosis of lumbar region without neurogenic claudication     Shoulder arthritis     Lumbar back pain     Foraminal stenosis of lumbar region     Neural foraminal stenosis of lumbar spine     Osteoarthritis of right glenohumeral joint     Morbidly obese ([address]

## 2021-11-06 RX ORDER — SPIRONOLACTONE AND HYDROCHLOROTHIAZIDE 25; 25 MG/1; MG/1
1 TABLET ORAL DAILY
Qty: 90 TABLET | Refills: 1 | Status: SHIPPED | OUTPATIENT
Start: 2021-11-06 | End: 2022-04-13 | Stop reason: SDUPTHER

## 2021-11-17 ENCOUNTER — TELEMEDICINE (OUTPATIENT)
Dept: FAMILY MEDICINE CLINIC | Age: 66
End: 2021-11-17
Payer: MEDICARE

## 2021-11-17 DIAGNOSIS — M48.061 NEURAL FORAMINAL STENOSIS OF LUMBAR SPINE: ICD-10-CM

## 2021-11-17 DIAGNOSIS — M19.011 OSTEOARTHRITIS OF RIGHT GLENOHUMERAL JOINT: Primary | ICD-10-CM

## 2021-11-17 PROCEDURE — G8427 DOCREV CUR MEDS BY ELIG CLIN: HCPCS | Performed by: NURSE PRACTITIONER

## 2021-11-17 PROCEDURE — 99214 OFFICE O/P EST MOD 30 MIN: CPT | Performed by: NURSE PRACTITIONER

## 2021-11-17 PROCEDURE — 3017F COLORECTAL CA SCREEN DOC REV: CPT | Performed by: NURSE PRACTITIONER

## 2021-11-17 PROCEDURE — G8400 PT W/DXA NO RESULTS DOC: HCPCS | Performed by: NURSE PRACTITIONER

## 2021-11-17 PROCEDURE — 1123F ACP DISCUSS/DSCN MKR DOCD: CPT | Performed by: NURSE PRACTITIONER

## 2021-11-17 PROCEDURE — 1090F PRES/ABSN URINE INCON ASSESS: CPT | Performed by: NURSE PRACTITIONER

## 2021-11-17 PROCEDURE — 4040F PNEUMOC VAC/ADMIN/RCVD: CPT | Performed by: NURSE PRACTITIONER

## 2021-11-17 ASSESSMENT — ENCOUNTER SYMPTOMS: BACK PAIN: 1

## 2021-11-17 NOTE — PROGRESS NOTES
[address]  [phone]    21    TELEHEALTH EVALUATION -- Audio/Visual (During QBSFY-11 public health emergency)  Patient ID: Ewelina Wyatt (:  1955) 78 y.o. female, has requested an audio/video evaluation for the following concern(s):  Established patient    Have you seen any other physician or provider since your last visit? No  Have you had any other diagnostic tests since your last visit? No  Have you been seen in the emergency room and/or had an admission to a hospital since we last saw you? No    Chief Complaint  Ewelina Wyatt presents today for Shoulder Injury  . ASSESSMENT/PLAN  1. Osteoarthritis of right glenohumeral joint  2. Neural foraminal stenosis of lumbar spine     Preoperative appointment scheduled     Return for Call if systems do not improve or get worse, As needed. On this date 2021 I have spent 36+ minutes reviewing previous notes, test results and face to face with the patient discussing the diagnosis and importance of compliance with the treatment plan as well as documenting on the day of the visit. Patient Care Team:  RENAN Miranda CNP as PCP - General (Nurse Practitioner Family)  RENAN Miranda CNP as PCP - [facility] Empaneled Provider  Azul Doshi MD as Consulting Physician (Orthopedic Surgery)    SUBJECTIVE/OBJECTIVE    History of Present illness / Visit Summary   Following with orthopedics, right shoulder surgical intervention planned for 2022. [facility] be completed at [facility]       Impression/Plan:   Ewelina Wyatt is a 76 y.o. old female with severe right shoulder glenohumeral joint osteoarthritis and moderate arthritis in the left shoulder. I once again had a discussion with the patient educating her about this problem and discussed treatment options available to her. This included nonoperative and operative intervention.   As outlined above she has undergone treatment for the right shoulder with cortisone injection as well as prescription strength NSAIDs. At this time she is interested in moving ahead with surgical intervention which would be a total shoulder replacement. This would likely be an anatomic implant but if necessary reverse implant if intraoperatively she is noted to have significant rotator cuff compromise. We discussed in detail what surgery would entail in terms of the procedure, risks and benefit of surgery, expected outcome and postoperative recovery course. Risks as discussed included but weren't limited to risk of infection, wound healing problems, excessive bleeding, vascular injury, temporary and/or permanent nerve injury, stiffness, instability, pain, implant loosening, implant failure, periprosthetic fracture, stress fracture, medical risks including DVT, PE, Stroke, and risk of anesthesia. She demonstrated a good understanding of our discussion and would like to proceed with surgery. We will schedule her for surgery at her convenience and facilitate her getting appropriate preoperative clearance prior to surgery. We will also order a CT scan to facilitate surgical planning. All questions were appropriately answered, but she was encouraged to return or call prior to surgery or any upcoming appointments with additional questions or concerns. With regards to her left shoulder we had a discussion today about a cortisone injection where she feels that its not painful enough to warrant that at this time. Review of Systems  Review of Systems   Constitutional: Positive for fatigue. Negative for activity change, appetite change, chills and fever. HENT: Negative for congestion. Genitourinary: Positive for frequency and urgency. Negative for difficulty urinating, dysuria and hematuria. Mixed incontinence  Wears pad    Musculoskeletal: Positive for arthralgias (bilateral hip, knees, left shoulder ), back pain, gait problem and myalgias. Following with Orthopedic, potential left shoulder surgery 2/2022. Will need clearance prior to scheduling    Neurological: Positive for numbness (Lateral lower extremities). Physical exam   Physical Exam  Vitals and nursing note reviewed. Constitutional:       General: She is not in acute distress. Appearance: Normal appearance. She is well-developed and well-groomed. She is not ill-appearing or toxic-appearing. HENT:      Head: Normocephalic. Mouth/Throat:      Lips: Pink. Pulmonary:      Effort: Pulmonary effort is normal. No respiratory distress. Skin:     Coloration: Skin is not cyanotic, jaundiced or pale. Neurological:      Mental Status: She is alert and oriented to person, place, and time. Psychiatric:         Attention and Perception: Attention and perception normal.         Mood and Affect: Mood and affect normal.         Speech: Speech normal.         Behavior: Behavior normal. Behavior is cooperative. Thought Content: Thought content normal. Thought content does not include suicidal ideation. Thought content does not include suicidal plan. Cognition and Memory: Cognition and memory normal.         Judgment: Judgment normal.       Due to this being a TeleHealth encounter, evaluation of the following organ systems is limited: Vitals/Constitutional/EENT/Resp/CV/GI//MS/Neuro/Skin/Heme-Lymph-Imm. This is a telehealth visit that was performed with the originating site at Patient Location: home and Provider Location of [place], [place]. Verbal consent to participate in video visit was obtained. Pursuant to the emergency declaration under the [facility], 1135 waiver authority and the [employer] and [employer] Act, this Virtual Visit was conducted, with patient's consent, to reduce the patient's risk of exposure to COVID-19 and provide continuity of care for an established/new patient. Services were provided through a video synchronous discussion virtually to substitute for in-person clinic visit. I discussed with the patient the nature of our telehealth visits via interactive/real-time audio/video that:  - I would evaluate the patient and recommend diagnostics and treatments based on my assessment  - Our sessions are not being recorded and that personal health information is protected  - Our team would provide follow up care in person if/when the patient needs it. Services were provided through a video synchronous discussion virtually to substitute for in-person clinic visit. Electronically signed by RENAN Devine CNP, APRN-CNP on 11/28/2021 at 3:05 PM    This note is created with the assistance of a speech-recognition program. While intending to generate a document that actually reflects the content of the visit, no guarantees can be provided that every mistake has been identified and corrected by editing.

## 2022-01-19 ENCOUNTER — OFFICE VISIT (OUTPATIENT)
Dept: FAMILY MEDICINE CLINIC | Age: 67
End: 2022-01-19
Payer: MEDICARE

## 2022-01-19 VITALS
SYSTOLIC BLOOD PRESSURE: 130 MMHG | HEART RATE: 84 BPM | DIASTOLIC BLOOD PRESSURE: 78 MMHG | HEIGHT: 67 IN | OXYGEN SATURATION: 96 % | TEMPERATURE: 98 F | BODY MASS INDEX: 40.81 KG/M2 | WEIGHT: 260 LBS | RESPIRATION RATE: 18 BRPM

## 2022-01-19 DIAGNOSIS — E78.2 MIXED HYPERLIPIDEMIA: ICD-10-CM

## 2022-01-19 DIAGNOSIS — E66.01 OBESITY, CLASS III, BMI 40-49.9 (MORBID OBESITY) (HCC): ICD-10-CM

## 2022-01-19 DIAGNOSIS — I10 ESSENTIAL HYPERTENSION: ICD-10-CM

## 2022-01-19 DIAGNOSIS — M19.011 OSTEOARTHRITIS OF RIGHT GLENOHUMERAL JOINT: ICD-10-CM

## 2022-01-19 DIAGNOSIS — M48.061 SPINAL STENOSIS OF LUMBAR REGION WITHOUT NEUROGENIC CLAUDICATION: ICD-10-CM

## 2022-01-19 DIAGNOSIS — Z01.818 PREOP EXAMINATION: Primary | ICD-10-CM

## 2022-01-19 PROCEDURE — 1090F PRES/ABSN URINE INCON ASSESS: CPT | Performed by: NURSE PRACTITIONER

## 2022-01-19 PROCEDURE — G8484 FLU IMMUNIZE NO ADMIN: HCPCS | Performed by: NURSE PRACTITIONER

## 2022-01-19 PROCEDURE — G8417 CALC BMI ABV UP PARAM F/U: HCPCS | Performed by: NURSE PRACTITIONER

## 2022-01-19 PROCEDURE — 99212 OFFICE O/P EST SF 10 MIN: CPT | Performed by: NURSE PRACTITIONER

## 2022-01-19 PROCEDURE — G8427 DOCREV CUR MEDS BY ELIG CLIN: HCPCS | Performed by: NURSE PRACTITIONER

## 2022-01-19 RX ORDER — OXYCODONE HYDROCHLORIDE AND ACETAMINOPHEN 5; 325 MG/1; MG/1
TABLET ORAL
COMMUNITY
Start: 2021-12-23

## 2022-01-19 ASSESSMENT — ENCOUNTER SYMPTOMS: BACK PAIN: 1

## 2022-01-19 NOTE — Clinical Note
Addendum 2/9/2022 -   Potassium repeated 2/8/2022 after 5 days supplementation.    Potassium 3.9  All other labs appropriate  No signs for urinary infection  EKG NSR  Hospital preoperative exam also completed  She is low risk for cardiovascular complications

## 2022-01-19 NOTE — PROGRESS NOTES
[address]  [phone]    1/19/22     Patient ID  Ritchie Melchor is a 78 y.o. female  Established patient    Chief Complaint  Ritchie Melchor presents today for Pre-op Exam    Have you seen any other physician or provider since your last visit? Yes - Records Obtained Dr. Vero Reaves you had any other diagnostic tests since your last visit? Yes - Records Obtained Preop testing 1 week before  Have you been seen in the emergency room and/or had an admission to a hospital since we last saw you? No    ASSESSMENT/PLAN  1. Preop examination  -     Urinalysis Reflex to Culture; Future  -     CBC; Future  -     Comprehensive Metabolic Panel; Future  -     EKG 12 lead; Future  2. Osteoarthritis of right glenohumeral joint  3. Essential hypertension  4. Mixed hyperlipidemia  5. Spinal stenosis of lumbar region without neurogenic claudication  6. Obesity, Class III, BMI 40-49.9 (morbid obesity) ([facility] [address]     Basic labs, urine and EKG ordered to ensure completed at scheduled preoperative testing   Scheduled friday/28/2022  Can not give clearance until labs and EKG reviewed    Addendum 2/9/2022 -   Potassium repeated 2/8/2022 after 5 days supplementation. Potassium 3.9  All other labs appropriate  No signs for urinary infection  EKG [facility] preoperative exam also completed  She is low risk for cardiovascular complications     Return for As needed, Call if systems do not improve or get worse. Patient Care Team:  RENAN Swan CNP as PCP - General (Nurse Practitioner Family)  RENAN Swan CNP as PCP [facility]  Viviana Delarosa MD as Consulting Physician (Orthopedic Surgery)    SUBJECTIVE/OBJECTIVE  History of Present illness / Visit Summary     Preop prior to visit with Dr. Ananth Bowers for a right shoulder surgery.   She has had multiple surgeries in past  She has had nerve blocks as well as general anesthesia  States becomes very nausous after, never aspirgted  She has not had a blood transfusion. Pending preop labs and 12 lead EKG clearance will be given. Alfredito -   Impression/Plan:   Denise Medina is a 76 y.o. old female with severe right shoulder glenohumeral joint osteoarthritis and moderate arthritis in the left shoulder. I once again had a discussion with the patient educating her about this problem and discussed treatment options available to her. This included nonoperative and operative intervention. As outlined above she has undergone treatment for the right shoulder with cortisone injection as well as prescription strength NSAIDs. At this time she is interested in moving ahead with surgical intervention which would be a total shoulder replacement. This would likely be an anatomic implant but if necessary reverse implant if intraoperatively she is noted to have significant rotator cuff compromise. We discussed in detail what surgery would entail in terms of the procedure, risks and benefit of surgery, expected outcome and postoperative recovery course. Risks as discussed included but weren't limited to risk of infection, wound healing problems, excessive bleeding, vascular injury, temporary and/or permanent nerve injury, stiffness, instability, pain, implant loosening, implant failure, periprosthetic fracture, stress fracture, medical risks including DVT, PE, Stroke, and risk of anesthesia. She demonstrated a good understanding of our discussion and would like to proceed with surgery. We will schedule her for surgery at her convenience and facilitate her getting appropriate preoperative clearance prior to surgery. We will also order a CT scan to facilitate surgical planning. All questions were appropriately answered, but she was encouraged to return or call prior to surgery or any upcoming appointments with additional questions or concerns.   With regards to her left shoulder we had a discussion today about a cortisone injection where she feels that its not painful enough to warrant that at this time. Review of Systems  Review of Systems   Constitutional: Positive for fatigue. Negative for activity change, appetite change, chills and fever. HENT: Negative for congestion. Genitourinary: Positive for frequency and urgency. Negative for difficulty urinating, dysuria and hematuria. Mixed incontinence  Wears pad    Musculoskeletal: Positive for arthralgias (bilateral hip, knees, shoulders), back pain, gait problem and myalgias. Following with Orthopedic, potential left shoulder surgery 2/2022. Will need clearance prior to scheduling    Neurological: Positive for numbness (Lateral lower extremities). Physical exam   Physical Exam  Vitals and nursing note reviewed. Constitutional:       General: She is not in acute distress. Appearance: Normal appearance. She is well-developed and well-groomed. She is obese. She is not ill-appearing or toxic-appearing. Cardiovascular:      Rate and Rhythm: Normal rate and regular rhythm. No extrasystoles are present. Heart sounds: Normal heart sounds, S1 normal and S2 normal. No murmur heard. Pulmonary:      Effort: Pulmonary effort is normal. No prolonged expiration or respiratory distress. Breath sounds: Normal breath sounds and air entry. Musculoskeletal:      Right shoulder: Decreased range of motion. Decreased strength. Comments: Limited ROM right shoulder    Skin:     General: Skin is warm and dry. Coloration: Skin is not ashen, cyanotic, jaundiced or pale. Neurological:      Mental Status: She is alert and oriented to person, place, and time. Motor: Motor function is intact. Gait: Gait is intact.    Psychiatric:         Attention and Perception: Attention and perception normal.         Mood and Affect: Mood and affect normal.         Speech: Speech normal.         Behavior: Behavior normal. Behavior is cooperative. Thought Content: Thought content normal. Thought content does not include suicidal ideation. Thought content does not include suicidal plan. Cognition and Memory: Cognition and memory normal.         Judgment: Judgment normal.           Electronically signed by RENAN Melendez CNP, APRN-CNP on 1/23/2022 at 8:39 PM    Please note that this chart was generated using voice recognition Dragon dictation software. Although every effort was made to ensure the accuracy of this automated transcription, some errors in transcription may have occurred.     Chart review, assessment findings, and documentation completed with assistance of  of Nurse Practitioner program.

## 2022-01-21 DIAGNOSIS — M19.011 OSTEOARTHRITIS OF RIGHT GLENOHUMERAL JOINT: ICD-10-CM

## 2022-01-21 NOTE — TELEPHONE ENCOUNTER
Last visit: 11-17-21  Last Med refill: 8-2-21  Does patient have enough medication for 72 hours: Yes    Next Visit Date:  Future Appointments   Date Time Provider Per Mora   1/28/2022 12:00 PM [facility] PAT RM 2 [facility] PAT [facility]   2/2/2022  1:15 PM Junior Garcia MD [facility]   2/23/2022  1:15 PM MD LIBIA Thornton ORT [facility]   3/23/2022  1:15 PM MD LIBIA Thornton ORT SP [facility]   5/4/2022  1:45 PM Junior Garcia MD [address] Maintenance   Topic Date Due    Colon cancer screen colonoscopy  Never done    Shingles Vaccine (1 of 2) Never done    DEXA (modify frequency per FRAX score)  Never done    Breast cancer screen  06/12/2015    Pneumococcal 65+ years Vaccine (1 of 1 - PPSV23) Never done   [employer] Visit (AWV)  Never done    Depression Screen  04/23/2022    Potassium monitoring  04/23/2022    Creatinine monitoring  04/23/2022    Lipid screen  04/23/2026    DTaP/Tdap/Td vaccine (2 - Td or Tdap) 11/12/2029    Flu vaccine  Completed    COVID-19 Vaccine  Completed    Hepatitis A vaccine  Aged Out    Hepatitis B vaccine  Aged Out    Hib vaccine  Aged Out    Meningococcal (ACWY) vaccine  Aged Out    Hepatitis C screen  Discontinued       Hemoglobin A1C (%)   Date Value   08/22/2018 5.4             ( goal A1C is < 7)   No results found for: LABMICR  LDL Cholesterol (mg/dL)   Date Value   04/23/2021 203 (H)     LDL Calculated (mg/dL)   Date Value   04/03/2019 212 (A)       (goal LDL is <100)   AST (U/L)   Date Value   04/23/2021 35 (H)     ALT (U/L)   Date Value   04/23/2021 22     BUN (mg/dL)   Date Value   04/23/2021 19     BP Readings from Last 3 Encounters:   01/19/22 130/78   07/23/21 138/84   04/23/21 124/82          (goal 120/80)    All Future Testing planned in [facility]  Lab Frequency Next Occurrence   CBC Once 67/51/1629   Basic Metabolic Panel Once 01/24/7561               Patient Active Problem List:     Essential hypertension Medication therapy management recommendation refused by patient     Mixed hyperlipidemia     Spinal stenosis of lumbar region without neurogenic claudication     Shoulder arthritis     Lumbar back pain     Foraminal stenosis of lumbar region     Neural foraminal stenosis of lumbar spine     Osteoarthritis of right glenohumeral joint     Morbidly obese ([address]

## 2022-01-23 RX ORDER — DULOXETIN HYDROCHLORIDE 60 MG/1
60 CAPSULE, DELAYED RELEASE ORAL DAILY
Qty: 90 CAPSULE | Refills: 1 | Status: SHIPPED | OUTPATIENT
Start: 2022-01-23 | End: 2022-07-08

## 2022-01-28 ENCOUNTER — HOSPITAL ENCOUNTER (OUTPATIENT)
Dept: PREADMISSION TESTING | Age: 67
Discharge: HOME | End: 2022-02-01
Payer: MEDICARE

## 2022-01-28 VITALS
RESPIRATION RATE: 16 BRPM | WEIGHT: 260 LBS | HEIGHT: 68 IN | BODY MASS INDEX: 39.4 KG/M2 | OXYGEN SATURATION: 99 % | SYSTOLIC BLOOD PRESSURE: 158 MMHG | DIASTOLIC BLOOD PRESSURE: 90 MMHG | HEART RATE: 72 BPM | TEMPERATURE: 98 F

## 2022-01-28 DIAGNOSIS — Z01.818 PREOP EXAMINATION: ICD-10-CM

## 2022-01-28 LAB
ABSOLUTE EOS #: 0.1 K/UL (ref 0–0.4)
ABSOLUTE IMMATURE GRANULOCYTE: ABNORMAL K/UL (ref 0–0.3)
ABSOLUTE LYMPH #: 1.5 K/UL (ref 1–4.8)
ABSOLUTE MONO #: 0.6 K/UL (ref 0.1–1.3)
ANION GAP SERPL CALCULATED.3IONS-SCNC: 13 MMOL/L (ref 9–17)
BASOPHILS # BLD: 1 % (ref 0–2)
BASOPHILS ABSOLUTE: 0 K/UL (ref 0–0.2)
BILIRUBIN URINE: NEGATIVE
BUN BLDV-MCNC: 24 MG/DL (ref 8–23)
BUN/CREAT BLD: ABNORMAL (ref 9–20)
CALCIUM SERPL-MCNC: 10.1 MG/DL (ref 8.6–10.4)
CHLORIDE BLD-SCNC: 98 MMOL/L (ref 98–107)
CO2: 28 MMOL/L (ref 20–31)
COLOR: YELLOW
COMMENT UA: NORMAL
CREAT SERPL-MCNC: 0.59 MG/DL (ref 0.5–0.9)
DIFFERENTIAL TYPE: ABNORMAL
EOSINOPHILS RELATIVE PERCENT: 1 % (ref 0–4)
GFR AFRICAN AMERICAN: >60 ML/MIN
GFR NON-AFRICAN AMERICAN: >60 ML/MIN
GFR SERPL CREATININE-BSD FRML MDRD: ABNORMAL ML/MIN/{1.73_M2}
GFR SERPL CREATININE-BSD FRML MDRD: ABNORMAL ML/MIN/{1.73_M2}
GLUCOSE BLD-MCNC: 106 MG/DL (ref 70–99)
GLUCOSE URINE: NEGATIVE
HCT VFR BLD CALC: 45.1 % (ref 36–46)
HEMOGLOBIN: 15.4 G/DL (ref 12–16)
IMMATURE GRANULOCYTES: ABNORMAL %
KETONES, URINE: NEGATIVE
LEUKOCYTE ESTERASE, URINE: NEGATIVE
LYMPHOCYTES # BLD: 24 % (ref 24–44)
MCH RBC QN AUTO: 30.4 PG (ref 26–34)
MCHC RBC AUTO-ENTMCNC: 34.1 G/DL (ref 31–37)
MCV RBC AUTO: 89.2 FL (ref 80–100)
MONOCYTES # BLD: 9 % (ref 1–7)
NITRITE, URINE: NEGATIVE
NRBC AUTOMATED: ABNORMAL PER 100 WBC
PDW BLD-RTO: 13.3 % (ref 11.5–14.9)
PH UA: 6 (ref 5–8)
PLATELET # BLD: 311 K/UL (ref 150–450)
PLATELET ESTIMATE: ABNORMAL
PMV BLD AUTO: 8.4 FL (ref 6–12)
POTASSIUM SERPL-SCNC: 3.3 MMOL/L (ref 3.7–5.3)
PROTEIN UA: NEGATIVE
RBC # BLD: 5.06 M/UL (ref 4–5.2)
RBC # BLD: ABNORMAL 10*6/UL
SEG NEUTROPHILS: 65 % (ref 36–66)
SEGMENTED NEUTROPHILS ABSOLUTE COUNT: 4.1 K/UL (ref 1.3–9.1)
SODIUM BLD-SCNC: 139 MMOL/L (ref 135–144)
SPECIFIC GRAVITY UA: 1.01 (ref 1–1.03)
TURBIDITY: CLEAR
URINE HGB: NEGATIVE
UROBILINOGEN, URINE: NORMAL
WBC # BLD: 6.3 K/UL (ref 3.5–11)
WBC # BLD: ABNORMAL 10*3/UL

## 2022-01-28 PROCEDURE — 36415 COLL VENOUS BLD VENIPUNCTURE: CPT

## 2022-01-28 PROCEDURE — 87641 MR-STAPH DNA AMP PROBE: CPT

## 2022-01-28 PROCEDURE — 80048 BASIC METABOLIC PNL TOTAL CA: CPT

## 2022-01-28 PROCEDURE — 81003 URINALYSIS AUTO W/O SCOPE: CPT

## 2022-01-28 PROCEDURE — 85025 COMPLETE CBC W/AUTO DIFF WBC: CPT

## 2022-01-28 PROCEDURE — 93005 ELECTROCARDIOGRAM TRACING: CPT | Performed by: NURSE PRACTITIONER

## 2022-01-28 ASSESSMENT — ENCOUNTER SYMPTOMS
SHORTNESS OF BREATH: 0
BACK PAIN: 1
NAUSEA: 0
TROUBLE SWALLOWING: 0
SORE THROAT: 0
BLOOD IN STOOL: 0
SINUS PAIN: 0
ABDOMINAL PAIN: 0
CONSTIPATION: 0
WHEEZING: 0
SINUS PRESSURE: 0
COUGH: 0
VOMITING: 0
DIARRHEA: 0
RHINORRHEA: 1

## 2022-01-28 NOTE — H&P (VIEW-ONLY)
HISTORY and [facility] [address] [address]       NAME:  Tye Arredondo  MRN: [id number]   YOB: 1955   Date: 1/28/2022   Age: 78 y.o. Gender: female     COMPLAINT AND PRESENT HISTORY:   Tye Arredondo is 78 y.o.,  female, presents for pre-anesthesia/admission testing for SHOULDER TOTAL ARTHROPLASTY- RIGHT per Dr. Froy Tavares. Primary dx: RIGHT GLENOHUMERAL OSTEOARTHRITIS.    HPI:  Patient states years ago she was undergoing back therapy, had to prop up on elbows, shoulders began to hurt- one night she got up to go to the bathroom, and felt like her right shoulder \"exploded\" sometime around [id number]. She states she felt like she was having a heart attack pain was so intense, pain stopped, and then began to have more of a chronic pain. States ROM is severely decreased. Denies any significant injury/trauma to right shoulder. She is right handed. Shoulder Pain   The pain is present in the right shoulder (Pain is into right shoulder joint). This is a chronic problem. There has been no history of extremity trauma. The problem occurs intermittently (Pain w/movement, cannot sleep on this side, even sleeping on opposite side, pulling will cause pain). The problem has been gradually worsening. The quality of the pain is described as aching (\"Deep ache\"). Pain scale: With movement- increases to 10 at times. Associated symptoms include joint locking and a limited range of motion. Pertinent negatives include no fever, joint swelling, numbness or tingling. The symptoms are aggravated by lying down and activity. She has tried rest (Takes Percoset for back pain, injection x1 (did not help ROM, pain did improve)) for the symptoms. Family history does not include gout or rheumatoid arthritis. Her past medical history is significant for osteoarthritis. There is no history of diabetes, gout or rheumatoid arthritis.      Testing completed r/t condition:  X-RAY RIGHT SHOULDER 9-1-21:  Narrative Xrays: 4 views of the right shoulder obtained on 9/1/2021 were    independently reviewed   Indications: Right shoulder pain   Findings: Complete glenohumeral joint space loss associated with a central    pattern of glenoid wear.  Large inferior humeral head osteophyte.  No    obvious fracture, dislocation or notable subluxation noted. Impression: Right shoulder radiographs with severe glenohumeral joint    degeneration as outlined above. Review of additional significant medical hx:  HLD, HTN: Denies recent/current chest pain, SOB, dizziness, leg swelling, headache. She states that she has had palpitations in the past, experiences occasionally- had a Holter monitor in the past- only had on for a few hours d/t adhesive allergy- f/u with cardiology and was told that there was not anything concerning but could see the \"fluttering\" sensation that she had discussed on Holter monitor. States she went to ED in the past d/t heart fluttering and low potassium level was discovered. States palpitations are not often, not sustained- intermittent issue, denies any recent issues w/palpitations. She does admit to drinking a lot of caffeine. She states she does not typically monitor BP at home, when she is in pain w/back- BP increases to around 150/90 when she checks at home. + anxiety, feels like this increases BP. She did already take her anti-HTN medication around 08:00 AM.  Current medications r/t condition: SPIRONOLACTONE-HCTZ  BP Readings from Last 3 Encounters:   01/28/22 (!) 158/90   01/19/22 130/78   07/23/21 138/84     Denies hx of MRSA infection. Denies hx of blood clots. Denies hx of any personal or family hx of complications w/anesthesia EXCEPT PONV W/PATIENT.    PAST MEDICAL HISTORY     Past Medical History:   Diagnosis Date    Arthritis     Chronic sinusitis     Dental crowns present     Diverticula, colon     Foraminal stenosis of lumbar region 12/04/2019    Hyperlipidemia     Hypertension     Lumbar back pain 12/04/2019    Neural foraminal stenosis of lumbar spine 12/04/2019    Osteoarthritis of right glenohumeral joint     Palpitations     PONV (postoperative nausea and vomiting)     Spinal stenosis     Spinal stenosis of lumbar region without neurogenic claudication 12/04/2019       SURGICAL HISTORY       Past Surgical History:   Procedure Laterality Date    CHOLECYSTECTOMY, OPEN  1976    COLONOSCOPY      HIP SURGERY Right 2007    REPLACEMENT    HYSTERECTOMY      JOINT REPLACEMENT Bilateral 2009    bilat knees and rt hip    LAMINECTOMY  2013    NASAL SEPTUM SURGERY      For deviated septum    SKIN BIOPSY      Neg     TONSILLECTOMY AND ADENOIDECTOMY         SOCIAL HISTORY       Social History     Socioeconomic History    Marital status:      Spouse name: None    Number of children: None    Years of education: None    Highest education level: None   Occupational History    None   Tobacco Use    Smoking status: Never Smoker    Smokeless tobacco: Never Used   Vaping Use    Vaping Use: Never used   Substance and Sexual Activity    Alcohol use: No    Drug use: No    Sexual activity: Not Currently   Other Topics Concern    None   Social History Narrative    None     Social Determinants of Health     Financial Resource Strain:     Difficulty of Paying Living Expenses: Not on file   Food Insecurity:     Worried About Running Out of Food in the Last Year: Not on file    Venessa of Food in the Last Year: Not on file   Transportation Needs:     Lack of Transportation (Medical): Not on file    Lack of Transportation (Non-Medical):  Not on file   Physical Activity:     Days of Exercise per Week: Not on file    Minutes of Exercise per Session: Not on file   Stress:     Feeling of Stress : Not on file   Social Connections:     Frequency of Communication with Friends and Family: Not on file    Frequency of Social Gatherings with Friends and Family: Not on file    Attends Roman [employer] Services: Not on file    Active Member of Clubs or Organizations: Not on file    Attends Club or Organization Meetings: Not on file    Marital Status: Not on file   Intimate Partner Violence:     Fear of Current or Ex-Partner: Not on file    Emotionally Abused: Not on file    Physically Abused: Not on file    Sexually Abused: Not on file   Housing Stability:     Unable to Pay for Housing in the Last Year: Not on file    Number of [place] in the Last Year: Not on file    Unstable Housing in the Last Year: Not on file       REVIEW OF SYSTEMS      Allergies   Allergen Reactions    Adhesive Tape Other (See Comments)     Patient states that it's like acid it eats my skin away.  Morphine Other (See Comments)     Not sure of reaction just told in the hospital never to take it again       Current Outpatient Medications on File Prior to Encounter   Medication Sig Dispense Refill    DULoxetine (CYMBALTA) 60 MG extended release capsule Take 1 capsule by mouth daily 90 capsule 1    oxyCODONE-acetaminophen (PERCOCET) 5-325 MG per tablet TAKE 1 TABLET BY MOUTH EVERY 8 TO 12 HOURS AS NEEDED .  DO NOT EXCEED 3 PER 24 HOURS      spironolactone-hydroCHLOROthiazide (ALDACTAZIDE) 25-25 MG per tablet Take 1 tablet by mouth daily 90 tablet 1    celecoxib (CELEBREX) 100 MG capsule Take 1 capsule by mouth 2 times daily 60 capsule 5    diclofenac sodium (VOLTAREN) 1 % GEL Apply 2 g topically 2 times daily 50 g 2    B Complex-Biotin-FA (B-COMPLEX PO) Take 1 tablet by mouth daily      dicyclomine (BENTYL) 10 MG capsule Take 1 capsule by mouth daily as needed (spasms) 60 capsule 5    tiZANidine (ZANAFLEX) 4 MG tablet take 1 tablet by mouth every 8 hours if needed for BACK SPASMS 180 tablet 1    NONFORMULARY Take 1 tablet by mouth 2 times daily Viviscal      Tens Unit MISC by Does not apply route      Multiple Vitamins-Minerals (CENTRUM SILVER 50+WOMEN) TABS Take 1 tablet by mouth daily      Coenzyme Q10 (COQ10) 100 MG CAPS Take 1 tablet by mouth 2 times daily      Omega 3-6-9 Fatty Acids (OMEGA 3-6-9 COMPLEX) CAPS Take 2 tablets by mouth 2 times daily      triamcinolone (NASACORT ALLERGY 24HR) 55 MCG/ACT nasal inhaler 2 sprays by Each Nostril route daily 1 Inhaler 3    Handicap Placard MISC by Does not apply route Expires in 5 years. 12/4/2024 1 each 0    acetaminophen (TYLENOL) 325 MG tablet Take 325 mg by mouth every 6 hours as needed for Pain       No current facility-administered medications on file prior to encounter. Review of Systems   Constitutional: Negative for chills and fever. HENT: Positive for rhinorrhea (Chronic sinusitis, clear rhinorrhea). Negative for congestion, ear pain, sinus pressure, sinus pain, sore throat and trouble swallowing. Respiratory: Negative for cough, shortness of breath and wheezing. Cardiovascular: Positive for palpitations. Negative for chest pain and leg swelling. Gastrointestinal: Negative for abdominal pain, blood in stool, constipation, diarrhea, nausea and vomiting. Genitourinary: Positive for frequency (Chronic). Negative for dysuria. Musculoskeletal: Positive for arthralgias, back pain (States that after back surgery, she could not feel her legs, still notes decreased sensation to b/l LE's, feels like legs are \"jelly\"- she can walk, but slowly) and gait problem. Negative for gout. SEE HPI. Skin: Negative for rash. Neurological: Positive for weakness (B/l LE's). Negative for dizziness, tingling, numbness and headaches. Hematological: Does not bruise/bleed easily. Psychiatric/Behavioral: The patient is nervous/anxious. GENERAL PHYSICAL EXAM     Vitals: BP (!) 158/90 Comment: ERI MANUAL  Pulse 72   Temp 98 °F (36.7 °C) (Temporal)   Resp 16   Ht 5' 7.5\" (1.715 m)   Wt 260 lb (117.9 kg)   SpO2 99%   BMI 40.12 kg/m²               Physical Exam  Vitals reviewed. Constitutional:       General: She is not in acute distress. Appearance: She is well-developed. She is not ill-appearing, toxic-appearing or diaphoretic. HENT:      Head: Normocephalic. Right Ear: External ear normal.      Left Ear: External ear normal.      Nose: Nose normal.      Mouth/Throat:      Dentition: Abnormal dentition (+ dental crowns). Pharynx: No oropharyngeal exudate or posterior oropharyngeal erythema. Tonsils: No tonsillar abscesses. Eyes:      General:         Right eye: No discharge. Left eye: No discharge. Conjunctiva/sclera: Conjunctivae normal.      Pupils: Pupils are equal, round, and reactive to light. Cardiovascular:      Rate and Rhythm: Normal rate and regular rhythm. Pulses: Intact distal pulses. Heart sounds: Normal heart sounds. Pulmonary:      Effort: Pulmonary effort is normal. No accessory muscle usage or respiratory distress. Breath sounds: Normal breath sounds. No decreased breath sounds, wheezing, rhonchi or rales. Abdominal:      General: Bowel sounds are normal. There is no distension. Palpations: Abdomen is soft. There is no mass. Tenderness: There is no abdominal tenderness. There is no guarding or rebound. Musculoskeletal:      Right shoulder: Tenderness (No erythema, no warmth) present. No swelling. Decreased range of motion. Normal pulse. Right hand: Normal. Normal capillary refill. Normal pulse. Right lower leg: No swelling or tenderness. No edema. Left lower leg: No swelling or tenderness. No edema. Comments: Negative Kelly's sign b/l. Lymphadenopathy:      Cervical: No cervical adenopathy. Skin:     General: Skin is warm and dry. Neurological:      Mental Status: She is alert and oriented to person, place, and time. Gait: Gait abnormal (Slow gait). Psychiatric:         Mood and Affect: Mood is anxious.          Behavior: Behavior normal.         LAB REVIEW     Lab Results   Component Value Date     01/28/2022    K 3.3 (L) 01/28/2022    CL 98 01/28/2022    CO2 28 01/28/2022    BUN 24 (H) 01/28/2022    CREATININE 0.59 01/28/2022    GLUCOSE 106 (H) 01/28/2022    CALCIUM 10.1 01/28/2022    PROT 8.5 (H) 04/23/2021    LABALBU 4.5 04/23/2021    BILITOT 0.59 04/23/2021    ALKPHOS 77 04/23/2021    AST 35 (H) 04/23/2021    ALT 22 04/23/2021    LABGLOM >60 01/28/2022    GFRAA >60 01/28/2022     Lab Results   Component Value Date    WBC 6.3 01/28/2022    HGB 15.4 01/28/2022    HCT 45.1 01/28/2022    MCV 89.2 01/28/2022     01/28/2022     MRSA and UACS pending at the signing of this note- see chart for results. PRELIMINARY EKG REVIEW, DATE: 1-28-22   NSR, 70 BPM.    SURGERY / PROVISIONAL DIAGNOSES:      SHOULDER TOTAL ARTHROPLASTY- RIGHT     RIGHT GLENOHUMERAL OSTEOARTHRITIS    Patient Active Problem List    Diagnosis Date Noted    Preop examination 01/28/2022    Morbidly obese ([address] 07/05/2020    Osteoarthritis of right glenohumeral joint 01/23/2020    Spinal stenosis of lumbar region without neurogenic claudication 12/04/2019    Shoulder arthritis 12/04/2019    Lumbar back pain 12/04/2019    Foraminal stenosis of lumbar region 12/04/2019    Neural foraminal stenosis of lumbar spine 12/04/2019    Medication therapy management recommendation refused by patient 01/09/2017    Mixed hyperlipidemia 01/09/2017    Essential hypertension 12/06/2016           CLEARANCE: Medical clearance requested per surgeon, Dr. Wilmer Schmidt. Based on my personal evaluation of patient including review of patient's chart, I agree with medical clearance required for scheduled surgery. Surgery scheduling will notify Dr. Natalie Ferrara office who will be responsible for making sure the clearance is obtained and is in the chart for surgery.     Total time spent on encounter- PAT provider minutes: 31-40 minutes     RENAN Simmons CNP on 1/28/2022 at 3:28 PM

## 2022-01-28 NOTE — H&P
Xrays: 4 views of the right shoulder obtained on 9/1/2021 were    independently reviewed   Indications: Right shoulder pain   Findings: Complete glenohumeral joint space loss associated with a central    pattern of glenoid wear.  Large inferior humeral head osteophyte.  No    obvious fracture, dislocation or notable subluxation noted. Impression: Right shoulder radiographs with severe glenohumeral joint    degeneration as outlined above. Review of additional significant medical hx:  HLD, HTN: Denies recent/current chest pain, SOB, dizziness, leg swelling, headache. She states that she has had palpitations in the past, experiences occasionally- had a Holter monitor in the past- only had on for a few hours d/t adhesive allergy- f/u with cardiology and was told that there was not anything concerning but could see the \"fluttering\" sensation that she had discussed on Holter monitor. States she went to ED in the past d/t heart fluttering and low potassium level was discovered. States palpitations are not often, not sustained- intermittent issue, denies any recent issues w/palpitations. She does admit to drinking a lot of caffeine. She states she does not typically monitor BP at home, when she is in pain w/back- BP increases to around 150/90 when she checks at home. + anxiety, feels like this increases BP. She did already take her anti-HTN medication around 08:00 AM.  Current medications r/t condition: SPIRONOLACTONE-HCTZ  BP Readings from Last 3 Encounters:   01/28/22 (!) 158/90   01/19/22 130/78   07/23/21 138/84     Denies hx of MRSA infection. Denies hx of blood clots. Denies hx of any personal or family hx of complications w/anesthesia EXCEPT PONV W/PATIENT.    PAST MEDICAL HISTORY     Past Medical History:   Diagnosis Date    Arthritis     Chronic sinusitis     Dental crowns present     Diverticula, colon     Foraminal stenosis of lumbar region 12/04/2019    Hyperlipidemia     Hypertension     Lumbar back pain 12/04/2019    Neural foraminal stenosis of lumbar spine 12/04/2019    Osteoarthritis of right glenohumeral joint     Palpitations     PONV (postoperative nausea and vomiting)     Spinal stenosis     Spinal stenosis of lumbar region without neurogenic claudication 12/04/2019       SURGICAL HISTORY       Past Surgical History:   Procedure Laterality Date    CHOLECYSTECTOMY, OPEN  1976    COLONOSCOPY      HIP SURGERY Right 2007    REPLACEMENT    HYSTERECTOMY      JOINT REPLACEMENT Bilateral 2009    bilat knees and rt hip    LAMINECTOMY  2013    NASAL SEPTUM SURGERY      For deviated septum    SKIN BIOPSY      Neg     TONSILLECTOMY AND ADENOIDECTOMY         SOCIAL HISTORY       Social History     Socioeconomic History    Marital status:      Spouse name: None    Number of children: None    Years of education: None    Highest education level: None   Occupational History    None   Tobacco Use    Smoking status: Never Smoker    Smokeless tobacco: Never Used   Vaping Use    Vaping Use: Never used   Substance and Sexual Activity    Alcohol use: No    Drug use: No    Sexual activity: Not Currently   Other Topics Concern    None   Social History Narrative    None     Social Determinants of Health     Financial Resource Strain:     Difficulty of Paying Living Expenses: Not on file   Food Insecurity:     Worried About Running Out of Food in the Last Year: Not on file    Venessa of Food in the Last Year: Not on file   Transportation Needs:     Lack of Transportation (Medical): Not on file    Lack of Transportation (Non-Medical):  Not on file   Physical Activity:     Days of Exercise per Week: Not on file    Minutes of Exercise per Session: Not on file   Stress:     Feeling of Stress : Not on file   Social Connections:     Frequency of Communication with Friends and Family: Not on file    Frequency of Social Gatherings with Friends and Family: Not on file    Attends Muslim Services: Not on file    Active Member of Clubs or Organizations: Not on file    Attends Club or Organization Meetings: Not on file    Marital Status: Not on file   Intimate Partner Violence:     Fear of Current or Ex-Partner: Not on file    Emotionally Abused: Not on file    Physically Abused: Not on file    Sexually Abused: Not on file   Housing Stability:     Unable to Pay for Housing in the Last Year: Not on file    Number of [place] in the Last Year: Not on file    Unstable Housing in the Last Year: Not on file       REVIEW OF SYSTEMS      Allergies   Allergen Reactions    Adhesive Tape Other (See Comments)     Patient states that it's like acid it eats my skin away.  Morphine Other (See Comments)     Not sure of reaction just told in the hospital never to take it again       Current Outpatient Medications on File Prior to Encounter   Medication Sig Dispense Refill    DULoxetine (CYMBALTA) 60 MG extended release capsule Take 1 capsule by mouth daily 90 capsule 1    oxyCODONE-acetaminophen (PERCOCET) 5-325 MG per tablet TAKE 1 TABLET BY MOUTH EVERY 8 TO 12 HOURS AS NEEDED .  DO NOT EXCEED 3 PER 24 HOURS      spironolactone-hydroCHLOROthiazide (ALDACTAZIDE) 25-25 MG per tablet Take 1 tablet by mouth daily 90 tablet 1    celecoxib (CELEBREX) 100 MG capsule Take 1 capsule by mouth 2 times daily 60 capsule 5    diclofenac sodium (VOLTAREN) 1 % GEL Apply 2 g topically 2 times daily 50 g 2    B Complex-Biotin-FA (B-COMPLEX PO) Take 1 tablet by mouth daily      dicyclomine (BENTYL) 10 MG capsule Take 1 capsule by mouth daily as needed (spasms) 60 capsule 5    tiZANidine (ZANAFLEX) 4 MG tablet take 1 tablet by mouth every 8 hours if needed for BACK SPASMS 180 tablet 1    NONFORMULARY Take 1 tablet by mouth 2 times daily Viviscal      Tens Unit MISC by Does not apply route      Multiple Vitamins-Minerals (CENTRUM SILVER 50+WOMEN) TABS Take 1 tablet by mouth daily      Coenzyme Q10 (COQ10) 100 MG CAPS Take 1 tablet by mouth 2 times daily      Omega 3-6-9 Fatty Acids (OMEGA 3-6-9 COMPLEX) CAPS Take 2 tablets by mouth 2 times daily      triamcinolone (NASACORT ALLERGY 24HR) 55 MCG/ACT nasal inhaler 2 sprays by Each Nostril route daily 1 Inhaler 3    Handicap Placard MISC by Does not apply route Expires in 5 years. 12/4/2024 1 each 0    acetaminophen (TYLENOL) 325 MG tablet Take 325 mg by mouth every 6 hours as needed for Pain       No current facility-administered medications on file prior to encounter. Review of Systems   Constitutional: Negative for chills and fever. HENT: Positive for rhinorrhea (Chronic sinusitis, clear rhinorrhea). Negative for congestion, ear pain, sinus pressure, sinus pain, sore throat and trouble swallowing. Respiratory: Negative for cough, shortness of breath and wheezing. Cardiovascular: Positive for palpitations. Negative for chest pain and leg swelling. Gastrointestinal: Negative for abdominal pain, blood in stool, constipation, diarrhea, nausea and vomiting. Genitourinary: Positive for frequency (Chronic). Negative for dysuria. Musculoskeletal: Positive for arthralgias, back pain (States that after back surgery, she could not feel her legs, still notes decreased sensation to b/l LE's, feels like legs are \"jelly\"- she can walk, but slowly) and gait problem. Negative for gout. SEE HPI. Skin: Negative for rash. Neurological: Positive for weakness (B/l LE's). Negative for dizziness, tingling, numbness and headaches. Hematological: Does not bruise/bleed easily. Psychiatric/Behavioral: The patient is nervous/anxious. GENERAL PHYSICAL EXAM     Vitals: BP (!) 158/90 Comment: ERI MANUAL  Pulse 72   Temp 98 °F (36.7 °C) (Temporal)   Resp 16   Ht 5' 7.5\" (1.715 m)   Wt 260 lb (117.9 kg)   SpO2 99%   BMI 40.12 kg/m²               Physical Exam  Vitals reviewed. Constitutional:       General: She is not in acute distress. Appearance: She is well-developed. She is not ill-appearing, toxic-appearing or diaphoretic. HENT:      Head: Normocephalic. Right Ear: External ear normal.      Left Ear: External ear normal.      Nose: Nose normal.      Mouth/Throat:      Dentition: Abnormal dentition (+ dental crowns). Pharynx: No oropharyngeal exudate or posterior oropharyngeal erythema. Tonsils: No tonsillar abscesses. Eyes:      General:         Right eye: No discharge. Left eye: No discharge. Conjunctiva/sclera: Conjunctivae normal.      Pupils: Pupils are equal, round, and reactive to light. Cardiovascular:      Rate and Rhythm: Normal rate and regular rhythm. Pulses: Intact distal pulses. Heart sounds: Normal heart sounds. Pulmonary:      Effort: Pulmonary effort is normal. No accessory muscle usage or respiratory distress. Breath sounds: Normal breath sounds. No decreased breath sounds, wheezing, rhonchi or rales. Abdominal:      General: Bowel sounds are normal. There is no distension. Palpations: Abdomen is soft. There is no mass. Tenderness: There is no abdominal tenderness. There is no guarding or rebound. Musculoskeletal:      Right shoulder: Tenderness (No erythema, no warmth) present. No swelling. Decreased range of motion. Normal pulse. Right hand: Normal. Normal capillary refill. Normal pulse. Right lower leg: No swelling or tenderness. No edema. Left lower leg: No swelling or tenderness. No edema. Comments: Negative Kelly's sign b/l. Lymphadenopathy:      Cervical: No cervical adenopathy. Skin:     General: Skin is warm and dry. Neurological:      Mental Status: She is alert and oriented to person, place, and time. Gait: Gait abnormal (Slow gait). Psychiatric:         Mood and Affect: Mood is anxious.          Behavior: Behavior normal.         LAB REVIEW     Lab Results   Component Value Date     01/28/2022    K 3.3 (L) 01/28/2022    CL 98 01/28/2022    CO2 28 01/28/2022    BUN 24 (H) 01/28/2022    CREATININE 0.59 01/28/2022    GLUCOSE 106 (H) 01/28/2022    CALCIUM 10.1 01/28/2022    PROT 8.5 (H) 04/23/2021    LABALBU 4.5 04/23/2021    BILITOT 0.59 04/23/2021    ALKPHOS 77 04/23/2021    AST 35 (H) 04/23/2021    ALT 22 04/23/2021    LABGLOM >60 01/28/2022    GFRAA >60 01/28/2022     Lab Results   Component Value Date    WBC 6.3 01/28/2022    HGB 15.4 01/28/2022    HCT 45.1 01/28/2022    MCV 89.2 01/28/2022     01/28/2022     MRSA and UACS pending at the signing of this note- see chart for results. PRELIMINARY EKG REVIEW, DATE: 1-28-22   NSR, 70 BPM.    SURGERY / PROVISIONAL DIAGNOSES:      SHOULDER TOTAL ARTHROPLASTY- RIGHT     RIGHT GLENOHUMERAL OSTEOARTHRITIS    Patient Active Problem List    Diagnosis Date Noted    Preop examination 01/28/2022    Morbidly obese ([address] 07/05/2020    Osteoarthritis of right glenohumeral joint 01/23/2020    Spinal stenosis of lumbar region without neurogenic claudication 12/04/2019    Shoulder arthritis 12/04/2019    Lumbar back pain 12/04/2019    Foraminal stenosis of lumbar region 12/04/2019    Neural foraminal stenosis of lumbar spine 12/04/2019    Medication therapy management recommendation refused by patient 01/09/2017    Mixed hyperlipidemia 01/09/2017    Essential hypertension 12/06/2016           CLEARANCE: Medical clearance requested per surgeon, Dr. Norman Yip. Based on my personal evaluation of patient including review of patient's chart, I agree with medical clearance required for scheduled surgery. Surgery scheduling will notify Dr. Desiree Gruber office who will be responsible for making sure the clearance is obtained and is in the chart for surgery.     Total time spent on encounter- PAT provider minutes: 31-40 minutes     RENAN Knutson CNP on 1/28/2022 at 3:28 PM

## 2022-01-29 ENCOUNTER — TELEPHONE (OUTPATIENT)
Dept: FAMILY MEDICINE CLINIC | Age: 67
End: 2022-01-29

## 2022-01-29 DIAGNOSIS — E87.6 HYPOKALEMIA: Primary | ICD-10-CM

## 2022-01-29 LAB
EKG ATRIAL RATE: 70 BPM
EKG P AXIS: 53 DEGREES
EKG P-R INTERVAL: 164 MS
EKG Q-T INTERVAL: 400 MS
EKG QRS DURATION: 90 MS
EKG QTC CALCULATION (BAZETT): 432 MS
EKG R AXIS: 52 DEGREES
EKG T AXIS: 41 DEGREES
EKG VENTRICULAR RATE: 70 BPM
MRSA, DNA, NASAL: NEGATIVE
SPECIMEN DESCRIPTION: NORMAL

## 2022-01-29 PROCEDURE — 93010 ELECTROCARDIOGRAM REPORT: CPT | Performed by: INTERNAL MEDICINE

## 2022-01-29 RX ORDER — POTASSIUM CHLORIDE 750 MG/1
10 TABLET, EXTENDED RELEASE ORAL 2 TIMES DAILY
Qty: 10 TABLET | Refills: 0 | Status: SHIPPED | OUTPATIENT
Start: 2022-01-29 | End: 2022-02-10

## 2022-01-29 NOTE — TELEPHONE ENCOUNTER
Please call patient and let her know that I saw her labs per preoperative testing. Her potassium was lower at 3.3  This will need corrected prior to surgery. I am sending potassium replacement to her pharmacy and will order for repeat potassium to be completed next Friday. I would like her to start this weekend.      Thank you

## 2022-02-02 ENCOUNTER — OFFICE VISIT (OUTPATIENT)
Dept: ORTHOPEDIC SURGERY | Age: 67
End: 2022-02-02

## 2022-02-02 VITALS — HEIGHT: 67 IN | WEIGHT: 260 LBS | BODY MASS INDEX: 40.81 KG/M2

## 2022-02-02 DIAGNOSIS — M19.011 OSTEOARTHRITIS OF GLENOHUMERAL JOINT, RIGHT: Primary | ICD-10-CM

## 2022-02-02 PROCEDURE — 99024 POSTOP FOLLOW-UP VISIT: CPT | Performed by: ORTHOPAEDIC SURGERY

## 2022-02-02 RX ORDER — KETOROLAC TROMETHAMINE 10 MG/1
10 TABLET, FILM COATED ORAL EVERY 8 HOURS PRN
Qty: 9 TABLET | Refills: 0 | Status: SHIPPED | OUTPATIENT
Start: 2022-02-02 | End: 2022-02-05

## 2022-02-02 RX ORDER — DOXYCYCLINE HYCLATE 100 MG
100 TABLET ORAL 2 TIMES DAILY
Qty: 14 TABLET | Refills: 0 | Status: SHIPPED | OUTPATIENT
Start: 2022-02-02 | End: 2022-02-09

## 2022-02-02 RX ORDER — HYDROCODONE BITARTRATE AND ACETAMINOPHEN 5; 325 MG/1; MG/1
1 TABLET ORAL EVERY 4 HOURS PRN
Qty: 42 TABLET | Refills: 0 | Status: SHIPPED | OUTPATIENT
Start: 2022-02-02 | End: 2022-02-09

## 2022-02-02 RX ORDER — ONDANSETRON 4 MG/1
4 TABLET, FILM COATED ORAL DAILY PRN
Qty: 20 TABLET | Refills: 0 | Status: SHIPPED | OUTPATIENT
Start: 2022-02-02

## 2022-02-02 NOTE — PROGRESS NOTES
HPI: Ms. Jose Lee is a 79-year-old here today for preoperative visit regarding her right shoulder. She does have severe glenohumeral joint osteoarthritis and has failed attempts at managing this conservatively and so she is electing to proceed with the right shoulder replacement. We once again discussed the details of the procedure, postoperative recovery course and expected outcome. Evaluation of her shoulder today demonstrates intact skin without warmth, erythema, or notable swelling. She was provided with her prescriptions that she will need for postoperative analgesia and short course of a prescription oral antibiotic. She was provided a sling to protect her shoulder leading up to and following surgery. She has obtained appropriate preoperative medical clearance. We will proceed with surgery as currently scheduled.
rolling walker (5 inch wheels)

## 2022-02-04 RX ORDER — ACETAMINOPHEN 325 MG/1
1000 TABLET ORAL ONCE
Status: CANCELLED | OUTPATIENT
Start: 2022-02-04 | End: 2022-02-04

## 2022-02-04 RX ORDER — SODIUM CHLORIDE 0.9 % (FLUSH) 0.9 %
10 SYRINGE (ML) INJECTION EVERY 12 HOURS SCHEDULED
Status: CANCELLED | OUTPATIENT
Start: 2022-02-04

## 2022-02-04 RX ORDER — SODIUM CHLORIDE 0.9 % (FLUSH) 0.9 %
10 SYRINGE (ML) INJECTION PRN
Status: CANCELLED | OUTPATIENT
Start: 2022-02-04

## 2022-02-04 RX ORDER — SODIUM CHLORIDE 9 MG/ML
25 INJECTION, SOLUTION INTRAVENOUS PRN
Status: CANCELLED | OUTPATIENT
Start: 2022-02-04

## 2022-02-07 ENCOUNTER — HOSPITAL ENCOUNTER (OUTPATIENT)
Dept: CT IMAGING | Facility: CLINIC | Age: 67
Discharge: HOME | End: 2022-02-09
Payer: MEDICARE

## 2022-02-07 DIAGNOSIS — M19.011 OSTEOARTHRITIS OF GLENOHUMERAL JOINT, RIGHT: ICD-10-CM

## 2022-02-07 PROCEDURE — 73200 CT UPPER EXTREMITY W/O DYE: CPT

## 2022-02-08 ENCOUNTER — HOSPITAL ENCOUNTER (OUTPATIENT)
Age: 67
Setting detail: SPECIMEN
Discharge: HOME | End: 2022-02-08

## 2022-02-08 LAB — POTASSIUM SERPL-SCNC: 3.9 MMOL/L (ref 3.7–5.3)

## 2022-02-09 ENCOUNTER — ANESTHESIA EVENT (OUTPATIENT)
Dept: OPERATING ROOM | Age: 67
End: 2022-02-09
Payer: MEDICARE

## 2022-02-10 ENCOUNTER — ANESTHESIA (OUTPATIENT)
Dept: OPERATING ROOM | Age: 67
End: 2022-02-10
Payer: MEDICARE

## 2022-02-10 ENCOUNTER — APPOINTMENT (OUTPATIENT)
Dept: GENERAL RADIOLOGY | Age: 67
End: 2022-02-10
Attending: ORTHOPAEDIC SURGERY
Payer: MEDICARE

## 2022-02-10 ENCOUNTER — HOSPITAL ENCOUNTER (OUTPATIENT)
Age: 67
Setting detail: SURGERY ADMIT
Discharge: HOME | End: 2022-02-10
Attending: ORTHOPAEDIC SURGERY | Admitting: ORTHOPAEDIC SURGERY
Payer: MEDICARE

## 2022-02-10 VITALS
OXYGEN SATURATION: 99 % | WEIGHT: 260 LBS | BODY MASS INDEX: 39.4 KG/M2 | DIASTOLIC BLOOD PRESSURE: 90 MMHG | RESPIRATION RATE: 16 BRPM | HEART RATE: 96 BPM | SYSTOLIC BLOOD PRESSURE: 150 MMHG | TEMPERATURE: 96.9 F | HEIGHT: 68 IN

## 2022-02-10 VITALS — SYSTOLIC BLOOD PRESSURE: 159 MMHG | DIASTOLIC BLOOD PRESSURE: 84 MMHG | OXYGEN SATURATION: 95 % | TEMPERATURE: 97.7 F

## 2022-02-10 PROCEDURE — 6360000002 HC RX W HCPCS

## 2022-02-10 PROCEDURE — 2580000003 HC RX 258: Performed by: ANESTHESIOLOGY

## 2022-02-10 PROCEDURE — 3700000001 HC ADD 15 MINUTES (ANESTHESIA): Performed by: ORTHOPAEDIC SURGERY

## 2022-02-10 PROCEDURE — 3600000004 HC SURGERY LEVEL 4 BASE: Performed by: ORTHOPAEDIC SURGERY

## 2022-02-10 PROCEDURE — 7100000030 HC ASPR PHASE II RECOVERY - FIRST 15 MIN: Performed by: ORTHOPAEDIC SURGERY

## 2022-02-10 PROCEDURE — 6360000002 HC RX W HCPCS: Performed by: ANESTHESIOLOGY

## 2022-02-10 PROCEDURE — 2709999900 HC NON-CHARGEABLE SUPPLY: Performed by: ORTHOPAEDIC SURGERY

## 2022-02-10 PROCEDURE — 7100000011 HC PHASE II RECOVERY - ADDTL 15 MIN: Performed by: ORTHOPAEDIC SURGERY

## 2022-02-10 PROCEDURE — 23472 RECONSTRUCT SHOULDER JOINT: CPT | Performed by: ORTHOPAEDIC SURGERY

## 2022-02-10 PROCEDURE — 7100000001 HC PACU RECOVERY - ADDTL 15 MIN: Performed by: ORTHOPAEDIC SURGERY

## 2022-02-10 PROCEDURE — C1776 JOINT DEVICE (IMPLANTABLE): HCPCS | Performed by: ORTHOPAEDIC SURGERY

## 2022-02-10 PROCEDURE — 6360000002 HC RX W HCPCS: Performed by: ORTHOPAEDIC SURGERY

## 2022-02-10 PROCEDURE — 6370000000 HC RX 637 (ALT 250 FOR IP): Performed by: ANESTHESIOLOGY

## 2022-02-10 PROCEDURE — C1713 ANCHOR/SCREW BN/BN,TIS/BN: HCPCS | Performed by: ORTHOPAEDIC SURGERY

## 2022-02-10 PROCEDURE — 6370000000 HC RX 637 (ALT 250 FOR IP): Performed by: ORTHOPAEDIC SURGERY

## 2022-02-10 PROCEDURE — 7100000031 HC ASPR PHASE II RECOVERY - ADDTL 15 MIN: Performed by: ORTHOPAEDIC SURGERY

## 2022-02-10 PROCEDURE — 7100000010 HC PHASE II RECOVERY - FIRST 15 MIN: Performed by: ORTHOPAEDIC SURGERY

## 2022-02-10 PROCEDURE — 7100000000 HC PACU RECOVERY - FIRST 15 MIN: Performed by: ORTHOPAEDIC SURGERY

## 2022-02-10 PROCEDURE — 3600000014 HC SURGERY LEVEL 4 ADDTL 15MIN: Performed by: ORTHOPAEDIC SURGERY

## 2022-02-10 PROCEDURE — 3700000000 HC ANESTHESIA ATTENDED CARE: Performed by: ORTHOPAEDIC SURGERY

## 2022-02-10 PROCEDURE — 2500000003 HC RX 250 WO HCPCS

## 2022-02-10 PROCEDURE — 73020 X-RAY EXAM OF SHOULDER: CPT

## 2022-02-10 PROCEDURE — 2500000003 HC RX 250 WO HCPCS: Performed by: ANESTHESIOLOGY

## 2022-02-10 PROCEDURE — 64415 NJX AA&/STRD BRCH PLXS IMG: CPT | Performed by: ANESTHESIOLOGY

## 2022-02-10 DEVICE — AR, SMALL SOCKET INSERT, 32MM NEUTRAL EPLUS
Type: IMPLANTABLE DEVICE | Site: SHOULDER | Status: FUNCTIONAL
Brand: DJO SURGICAL

## 2022-02-10 DEVICE — RSP BASEPLATE, 30MM, W/P2 COATING
Type: IMPLANTABLE DEVICE | Site: SHOULDER | Status: FUNCTIONAL
Brand: DJO SURGICAL

## 2022-02-10 DEVICE — SCREW, LOCKING BONE, RSP, 5X14
Type: IMPLANTABLE DEVICE | Site: SHOULDER | Status: FUNCTIONAL
Brand: DJO SURGICAL

## 2022-02-10 DEVICE — IMPLANTABLE DEVICE: Type: IMPLANTABLE DEVICE | Site: SHOULDER | Status: FUNCTIONAL

## 2022-02-10 DEVICE — SCREW, LOCKING BONE, RSP, 5X18
Type: IMPLANTABLE DEVICE | Site: SHOULDER | Status: FUNCTIONAL
Brand: DJO SURGICAL

## 2022-02-10 DEVICE — GLENOID, HEAD W/RETAINING SCREW, RSP, 32MM/NEUTRAL
Type: IMPLANTABLE DEVICE | Site: SHOULDER | Status: FUNCTIONAL
Brand: DJO SURGICAL

## 2022-02-10 DEVICE — CEMENT BNE 20ML 41GM FULL DOSE PMMA W/ TOBRA M VISC RADPQ: Type: IMPLANTABLE DEVICE | Site: SHOULDER | Status: FUNCTIONAL

## 2022-02-10 DEVICE — SCREW, LOCKING BONE, RSP, 5X22
Type: IMPLANTABLE DEVICE | Site: SHOULDER | Status: FUNCTIONAL
Brand: DJO SURGICAL

## 2022-02-10 RX ORDER — SODIUM CHLORIDE 0.9 % (FLUSH) 0.9 %
10 SYRINGE (ML) INJECTION PRN
Status: DISCONTINUED | OUTPATIENT
Start: 2022-02-10 | End: 2022-02-10 | Stop reason: HOSPADM

## 2022-02-10 RX ORDER — ROCURONIUM BROMIDE 10 MG/ML
INJECTION, SOLUTION INTRAVENOUS PRN
Status: DISCONTINUED | OUTPATIENT
Start: 2022-02-10 | End: 2022-02-10 | Stop reason: SDUPTHER

## 2022-02-10 RX ORDER — SODIUM CHLORIDE 9 MG/ML
25 INJECTION, SOLUTION INTRAVENOUS PRN
Status: DISCONTINUED | OUTPATIENT
Start: 2022-02-10 | End: 2022-02-10 | Stop reason: HOSPADM

## 2022-02-10 RX ORDER — SODIUM CHLORIDE 0.9 % (FLUSH) 0.9 %
10 SYRINGE (ML) INJECTION EVERY 12 HOURS SCHEDULED
Status: DISCONTINUED | OUTPATIENT
Start: 2022-02-10 | End: 2022-02-10 | Stop reason: HOSPADM

## 2022-02-10 RX ORDER — TRANEXAMIC ACID 100 MG/ML
INJECTION, SOLUTION INTRAVENOUS PRN
Status: DISCONTINUED | OUTPATIENT
Start: 2022-02-10 | End: 2022-02-10 | Stop reason: SDUPTHER

## 2022-02-10 RX ORDER — DIPHENHYDRAMINE HYDROCHLORIDE 50 MG/ML
12.5 INJECTION INTRAMUSCULAR; INTRAVENOUS
Status: DISCONTINUED | OUTPATIENT
Start: 2022-02-10 | End: 2022-02-10 | Stop reason: HOSPADM

## 2022-02-10 RX ORDER — MEPERIDINE HYDROCHLORIDE 25 MG/ML
12.5 INJECTION INTRAMUSCULAR; INTRAVENOUS; SUBCUTANEOUS EVERY 5 MIN PRN
Status: DISCONTINUED | OUTPATIENT
Start: 2022-02-10 | End: 2022-02-10 | Stop reason: HOSPADM

## 2022-02-10 RX ORDER — ACETAMINOPHEN 500 MG
1000 TABLET ORAL ONCE
Status: COMPLETED | OUTPATIENT
Start: 2022-02-10 | End: 2022-02-10

## 2022-02-10 RX ORDER — DIPHENHYDRAMINE HYDROCHLORIDE 50 MG/ML
INJECTION INTRAMUSCULAR; INTRAVENOUS PRN
Status: DISCONTINUED | OUTPATIENT
Start: 2022-02-10 | End: 2022-02-10 | Stop reason: SDUPTHER

## 2022-02-10 RX ORDER — DEXAMETHASONE SODIUM PHOSPHATE 10 MG/ML
10 INJECTION, SOLUTION INTRAMUSCULAR; INTRAVENOUS ONCE
Status: COMPLETED | OUTPATIENT
Start: 2022-02-10 | End: 2022-02-10

## 2022-02-10 RX ORDER — ONDANSETRON 2 MG/ML
4 INJECTION INTRAMUSCULAR; INTRAVENOUS
Status: DISCONTINUED | OUTPATIENT
Start: 2022-02-10 | End: 2022-02-10 | Stop reason: HOSPADM

## 2022-02-10 RX ORDER — ONDANSETRON 2 MG/ML
INJECTION INTRAMUSCULAR; INTRAVENOUS PRN
Status: DISCONTINUED | OUTPATIENT
Start: 2022-02-10 | End: 2022-02-10 | Stop reason: SDUPTHER

## 2022-02-10 RX ORDER — MIDAZOLAM HYDROCHLORIDE 1 MG/ML
2 INJECTION INTRAMUSCULAR; INTRAVENOUS ONCE
Status: COMPLETED | OUTPATIENT
Start: 2022-02-10 | End: 2022-02-10

## 2022-02-10 RX ORDER — FENTANYL CITRATE 50 UG/ML
25 INJECTION, SOLUTION INTRAMUSCULAR; INTRAVENOUS ONCE
Status: DISCONTINUED | OUTPATIENT
Start: 2022-02-10 | End: 2022-02-10 | Stop reason: HOSPADM

## 2022-02-10 RX ORDER — LIDOCAINE HYDROCHLORIDE 10 MG/ML
INJECTION, SOLUTION EPIDURAL; INFILTRATION; INTRACAUDAL; PERINEURAL PRN
Status: DISCONTINUED | OUTPATIENT
Start: 2022-02-10 | End: 2022-02-10 | Stop reason: SDUPTHER

## 2022-02-10 RX ORDER — MORPHINE SULFATE 2 MG/ML
2 INJECTION, SOLUTION INTRAMUSCULAR; INTRAVENOUS EVERY 5 MIN PRN
Status: DISCONTINUED | OUTPATIENT
Start: 2022-02-10 | End: 2022-02-10 | Stop reason: HOSPADM

## 2022-02-10 RX ORDER — LIDOCAINE HYDROCHLORIDE 10 MG/ML
1 INJECTION, SOLUTION EPIDURAL; INFILTRATION; INTRACAUDAL; PERINEURAL
Status: DISCONTINUED | OUTPATIENT
Start: 2022-02-10 | End: 2022-02-10 | Stop reason: HOSPADM

## 2022-02-10 RX ORDER — MIDAZOLAM HYDROCHLORIDE 1 MG/ML
INJECTION INTRAMUSCULAR; INTRAVENOUS PRN
Status: DISCONTINUED | OUTPATIENT
Start: 2022-02-10 | End: 2022-02-10 | Stop reason: SDUPTHER

## 2022-02-10 RX ORDER — EPHEDRINE SULFATE/0.9% NACL/PF 50 MG/5 ML
SYRINGE (ML) INTRAVENOUS PRN
Status: DISCONTINUED | OUTPATIENT
Start: 2022-02-10 | End: 2022-02-10 | Stop reason: SDUPTHER

## 2022-02-10 RX ORDER — FENTANYL CITRATE 50 UG/ML
INJECTION, SOLUTION INTRAMUSCULAR; INTRAVENOUS PRN
Status: DISCONTINUED | OUTPATIENT
Start: 2022-02-10 | End: 2022-02-10 | Stop reason: SDUPTHER

## 2022-02-10 RX ORDER — SODIUM CHLORIDE, SODIUM LACTATE, POTASSIUM CHLORIDE, CALCIUM CHLORIDE 600; 310; 30; 20 MG/100ML; MG/100ML; MG/100ML; MG/100ML
INJECTION, SOLUTION INTRAVENOUS CONTINUOUS
Status: DISCONTINUED | OUTPATIENT
Start: 2022-02-10 | End: 2022-02-10 | Stop reason: HOSPADM

## 2022-02-10 RX ORDER — BUPIVACAINE HYDROCHLORIDE 5 MG/ML
10 INJECTION, SOLUTION EPIDURAL; INTRACAUDAL ONCE
Status: DISCONTINUED | OUTPATIENT
Start: 2022-02-10 | End: 2022-02-10 | Stop reason: HOSPADM

## 2022-02-10 RX ORDER — PROPOFOL 10 MG/ML
INJECTION, EMULSION INTRAVENOUS PRN
Status: DISCONTINUED | OUTPATIENT
Start: 2022-02-10 | End: 2022-02-10 | Stop reason: SDUPTHER

## 2022-02-10 RX ORDER — LABETALOL HYDROCHLORIDE 5 MG/ML
5 INJECTION, SOLUTION INTRAVENOUS EVERY 10 MIN PRN
Status: DISCONTINUED | OUTPATIENT
Start: 2022-02-10 | End: 2022-02-10 | Stop reason: HOSPADM

## 2022-02-10 RX ORDER — SCOLOPAMINE TRANSDERMAL SYSTEM 1 MG/1
1 PATCH, EXTENDED RELEASE TRANSDERMAL ONCE
Status: DISCONTINUED | OUTPATIENT
Start: 2022-02-10 | End: 2022-02-10 | Stop reason: HOSPADM

## 2022-02-10 RX ADMIN — PROPOFOL 150 MG: 10 INJECTION, EMULSION INTRAVENOUS at 07:13

## 2022-02-10 RX ADMIN — LIDOCAINE HYDROCHLORIDE 50 MG: 10 INJECTION, SOLUTION EPIDURAL; INFILTRATION; INTRACAUDAL; PERINEURAL at 07:13

## 2022-02-10 RX ADMIN — MIDAZOLAM 2 MG: 1 INJECTION INTRAMUSCULAR; INTRAVENOUS at 06:52

## 2022-02-10 RX ADMIN — MIDAZOLAM 2 MG: 1 INJECTION INTRAMUSCULAR; INTRAVENOUS at 07:05

## 2022-02-10 RX ADMIN — ONDANSETRON 4 MG: 2 INJECTION INTRAMUSCULAR; INTRAVENOUS at 07:26

## 2022-02-10 RX ADMIN — FENTANYL CITRATE 25 MCG: 50 INJECTION, SOLUTION INTRAMUSCULAR; INTRAVENOUS at 10:21

## 2022-02-10 RX ADMIN — DEXAMETHASONE SODIUM PHOSPHATE 10 MG: 10 INJECTION INTRAMUSCULAR; INTRAVENOUS at 07:26

## 2022-02-10 RX ADMIN — Medication 10 MG: at 07:55

## 2022-02-10 RX ADMIN — Medication 10 MG: at 07:52

## 2022-02-10 RX ADMIN — FENTANYL CITRATE 25 MCG: 50 INJECTION, SOLUTION INTRAMUSCULAR; INTRAVENOUS at 10:13

## 2022-02-10 RX ADMIN — FENTANYL CITRATE 50 MCG: 50 INJECTION, SOLUTION INTRAMUSCULAR; INTRAVENOUS at 07:13

## 2022-02-10 RX ADMIN — LABETALOL HYDROCHLORIDE 5 MG: 5 INJECTION, SOLUTION INTRAVENOUS at 11:24

## 2022-02-10 RX ADMIN — CEFAZOLIN SODIUM 2000 MG: 10 INJECTION, POWDER, FOR SOLUTION INTRAVENOUS at 07:22

## 2022-02-10 RX ADMIN — Medication 10 MG: at 09:00

## 2022-02-10 RX ADMIN — DIPHENHYDRAMINE HYDROCHLORIDE 12.5 MG: 50 INJECTION INTRAMUSCULAR; INTRAVENOUS at 07:26

## 2022-02-10 RX ADMIN — ACETAMINOPHEN 1000 MG: 500 TABLET ORAL at 06:18

## 2022-02-10 RX ADMIN — TRANEXAMIC ACID 1000 MG: 100 INJECTION, SOLUTION INTRAVENOUS at 07:26

## 2022-02-10 RX ADMIN — SODIUM CHLORIDE, POTASSIUM CHLORIDE, SODIUM LACTATE AND CALCIUM CHLORIDE: 600; 310; 30; 20 INJECTION, SOLUTION INTRAVENOUS at 10:04

## 2022-02-10 RX ADMIN — ROCURONIUM BROMIDE 10 MG: 10 INJECTION, SOLUTION INTRAVENOUS at 09:13

## 2022-02-10 RX ADMIN — SODIUM CHLORIDE, POTASSIUM CHLORIDE, SODIUM LACTATE AND CALCIUM CHLORIDE: 600; 310; 30; 20 INJECTION, SOLUTION INTRAVENOUS at 06:26

## 2022-02-10 RX ADMIN — ROCURONIUM BROMIDE 20 MG: 10 INJECTION, SOLUTION INTRAVENOUS at 07:55

## 2022-02-10 RX ADMIN — ROCURONIUM BROMIDE 50 MG: 10 INJECTION, SOLUTION INTRAVENOUS at 07:13

## 2022-02-10 RX ADMIN — SUGAMMADEX 200 MG: 100 INJECTION, SOLUTION INTRAVENOUS at 09:54

## 2022-02-10 ASSESSMENT — PULMONARY FUNCTION TESTS
PIF_VALUE: 19
PIF_VALUE: 22
PIF_VALUE: 1
PIF_VALUE: 20
PIF_VALUE: 21
PIF_VALUE: 22
PIF_VALUE: 2
PIF_VALUE: 2
PIF_VALUE: 20
PIF_VALUE: 19
PIF_VALUE: 2
PIF_VALUE: 2
PIF_VALUE: 16
PIF_VALUE: 19
PIF_VALUE: 20
PIF_VALUE: 19
PIF_VALUE: 20
PIF_VALUE: 19
PIF_VALUE: 20
PIF_VALUE: 22
PIF_VALUE: 1
PIF_VALUE: 2
PIF_VALUE: 18
PIF_VALUE: 1
PIF_VALUE: 21
PIF_VALUE: 14
PIF_VALUE: 2
PIF_VALUE: 23
PIF_VALUE: 16
PIF_VALUE: 23
PIF_VALUE: 20
PIF_VALUE: 24
PIF_VALUE: 0
PIF_VALUE: 0
PIF_VALUE: 20
PIF_VALUE: 20
PIF_VALUE: 22
PIF_VALUE: 20
PIF_VALUE: 19
PIF_VALUE: 2
PIF_VALUE: 19
PIF_VALUE: 23
PIF_VALUE: 23
PIF_VALUE: 17
PIF_VALUE: 19
PIF_VALUE: 22
PIF_VALUE: 2
PIF_VALUE: 20
PIF_VALUE: 2
PIF_VALUE: 19
PIF_VALUE: 2
PIF_VALUE: 1
PIF_VALUE: 20
PIF_VALUE: 19
PIF_VALUE: 2
PIF_VALUE: 19
PIF_VALUE: 22
PIF_VALUE: 20
PIF_VALUE: 21
PIF_VALUE: 18
PIF_VALUE: 19
PIF_VALUE: 13
PIF_VALUE: 13
PIF_VALUE: 2
PIF_VALUE: 22
PIF_VALUE: 21
PIF_VALUE: 20
PIF_VALUE: 2
PIF_VALUE: 19
PIF_VALUE: 22
PIF_VALUE: 16
PIF_VALUE: 20
PIF_VALUE: 21
PIF_VALUE: 23
PIF_VALUE: 22
PIF_VALUE: 20
PIF_VALUE: 22
PIF_VALUE: 24
PIF_VALUE: 3
PIF_VALUE: 2
PIF_VALUE: 2
PIF_VALUE: 25
PIF_VALUE: 21
PIF_VALUE: 22
PIF_VALUE: 22
PIF_VALUE: 14
PIF_VALUE: 2
PIF_VALUE: 19
PIF_VALUE: 2
PIF_VALUE: 22
PIF_VALUE: 18
PIF_VALUE: 22
PIF_VALUE: 23
PIF_VALUE: 21
PIF_VALUE: 18
PIF_VALUE: 20
PIF_VALUE: 2
PIF_VALUE: 20
PIF_VALUE: 23
PIF_VALUE: 20
PIF_VALUE: 15
PIF_VALUE: 2
PIF_VALUE: 19
PIF_VALUE: 21
PIF_VALUE: 2
PIF_VALUE: 25
PIF_VALUE: 20
PIF_VALUE: 22
PIF_VALUE: 2
PIF_VALUE: 19
PIF_VALUE: 2
PIF_VALUE: 21
PIF_VALUE: 21
PIF_VALUE: 1
PIF_VALUE: 18
PIF_VALUE: 19
PIF_VALUE: 24
PIF_VALUE: 19
PIF_VALUE: 1
PIF_VALUE: 2
PIF_VALUE: 2
PIF_VALUE: 19
PIF_VALUE: 19
PIF_VALUE: 20
PIF_VALUE: 13
PIF_VALUE: 19
PIF_VALUE: 25
PIF_VALUE: 21
PIF_VALUE: 22
PIF_VALUE: 2
PIF_VALUE: 21
PIF_VALUE: 20
PIF_VALUE: 2
PIF_VALUE: 24
PIF_VALUE: 21
PIF_VALUE: 20
PIF_VALUE: 22
PIF_VALUE: 19
PIF_VALUE: 20
PIF_VALUE: 14
PIF_VALUE: 19
PIF_VALUE: 20
PIF_VALUE: 21
PIF_VALUE: 2
PIF_VALUE: 20
PIF_VALUE: 18
PIF_VALUE: 5
PIF_VALUE: 1
PIF_VALUE: 3
PIF_VALUE: 16
PIF_VALUE: 2
PIF_VALUE: 3
PIF_VALUE: 18
PIF_VALUE: 20
PIF_VALUE: 21
PIF_VALUE: 1
PIF_VALUE: 18
PIF_VALUE: 19
PIF_VALUE: 20
PIF_VALUE: 21
PIF_VALUE: 23
PIF_VALUE: 21
PIF_VALUE: 22
PIF_VALUE: 16
PIF_VALUE: 21
PIF_VALUE: 20
PIF_VALUE: 16
PIF_VALUE: 14
PIF_VALUE: 21
PIF_VALUE: 22
PIF_VALUE: 22
PIF_VALUE: 18
PIF_VALUE: 21
PIF_VALUE: 20
PIF_VALUE: 2
PIF_VALUE: 25
PIF_VALUE: 18
PIF_VALUE: 13
PIF_VALUE: 2
PIF_VALUE: 4
PIF_VALUE: 22
PIF_VALUE: 19
PIF_VALUE: 20
PIF_VALUE: 21
PIF_VALUE: 19
PIF_VALUE: 2
PIF_VALUE: 22
PIF_VALUE: 21
PIF_VALUE: 2
PIF_VALUE: 20
PIF_VALUE: 17
PIF_VALUE: 16
PIF_VALUE: 20
PIF_VALUE: 20
PIF_VALUE: 22
PIF_VALUE: 2
PIF_VALUE: 20
PIF_VALUE: 18
PIF_VALUE: 19
PIF_VALUE: 20
PIF_VALUE: 17
PIF_VALUE: 20
PIF_VALUE: 2
PIF_VALUE: 20

## 2022-02-10 ASSESSMENT — ENCOUNTER SYMPTOMS
STRIDOR: 0
SHORTNESS OF BREATH: 0

## 2022-02-10 ASSESSMENT — PAIN SCALES - GENERAL
PAINLEVEL_OUTOF10: 0

## 2022-02-10 ASSESSMENT — LIFESTYLE VARIABLES: SMOKING_STATUS: 0

## 2022-02-10 ASSESSMENT — PAIN - FUNCTIONAL ASSESSMENT: PAIN_FUNCTIONAL_ASSESSMENT: 0-10

## 2022-02-10 NOTE — ANESTHESIA POSTPROCEDURE EVALUATION
POST- ANESTHESIA EVALUATION       Pt Name: Emely Montano  MRN: [id number]  YOB: 1955  Date of evaluation: 2/10/2022  Time:  2:05 PM      BP (!) 150/90   Pulse 96   Temp 96.9 °F (36.1 °C) (Infrared)   Resp 16   Ht 5' 7.5\" (1.715 m)   Wt 260 lb (117.9 kg)   SpO2 99%   BMI 40.12 kg/m²      Consciousness Level  Awake  Cardiopulmonary Status  Stable  Pain Adequately Treated YES  Nausea / Vomiting  NO  Adequate Hydration  YES  Anesthesia Related Complications NONE      Electronically signed by Shen Kc MD on 2/10/2022 at 2:05 PM       Department of Anesthesiology  Postprocedure Note    Patient: Emely Montano  MRN: [id number]  YOB: 1955  Date of evaluation: 2/10/2022  Time:  2:04 PM     Procedure Summary     Date: 02/10/22 Room / Location: [address] [place] [address] Damian [address] / [facility]: [facility]    Anesthesia Start: 5857 Anesthesia Stop: 5038    Procedure: SHOULDER REVERSE TOTAL ARTHROPLASTY (Right Shoulder) Diagnosis:       (RIGHT GLENOHUMERAL OSTEOARTHRITIS)      (PT FULLY VACCINATED)    Surgeons: Jarret Cummings MD Responsible Provider: Shen Kc MD    Anesthesia Type: general ASA Status: 2          Anesthesia Type: general    Jamel Phase I: Jamel Score: 10    Jamel Phase II: Jamel Score: 10    Last vitals: Reviewed and per EMR flowsheets.        Anesthesia Post Evaluation

## 2022-02-10 NOTE — OP NOTE
Operative Report     Date of Procedure: 2/10/2022     Preop Diagnosis:   Right Severe glenohumeral arthritis (M19.011)    Postop Diagnosis:   1. Right Severe glenohumeral arthritis (M19.011)  2. Right Rotator Cuff Tear    Operation:     1. Right Reverse Shoulder Arthroplasty (04653)      Surgeon: Julieta Goddadr MD    Surgical Assistant:  Ana Malcolm     Anesthesia: General with right interscalene nerve block    EBL: 100 cc    Summary of findings:  Severe glenohumeral arthritis. Ossification and tear of the posterosuperior rotator cuff. Central glenoid wear with significant medialization. Implants used:  DJO Altivate Reverse Shoulder    - Uncemented baseplate with four 5.0 mm locking screws screws measuring 22   mm, 18 mm, 14 mm, 14 mm   -32 neutral glenosphere   - Cemented AltiVate size 10 humeral Stem   -32 standard Poly Insert    Indications: Amelia Arteaga is a 78 y.o. old female who presents with severe functionally disabling shoulder pain despite non-operative treatment. Recent radiographs and CT Scan revealed severe glenohumeral arthritis with significant ossification of the posterior/superior rotator cuff. Clinically she had Rotator cuff weakness. Having failed attempts at conservative management and following a discussion with regards to her treatment options including continued non-surgical treatment and operative intervention, she elected to forgo continued attempts at conservative treatment and proceed with surgery by way of a total shoulder arthroplasty possible reverse shoulder replacement. Risks, benefits, and alternatives were fully discussed. Postoperative limitations and limitations of the procedure were discussed in detail. The patient understood risks, alternatives, complications, & post-operative limitations and wishes to proceed. Operative Report: Following appropriate identification of the patient and her operative extremity in the preoperative area, consent was reviewed with patient. Risks, benefits, and alternatives were discussed. All questions were answered. The anesthesia team administered a right interscalene nerve block and the patient was taken back to the OR and transferred onto the operative table. The anesthesia team administered a general anesthetic and established/secured her airway . SCDs/TEDs were placed on both legs. The patient was carefully positioned and secured in the beach-chair position, padding all prominences. The right upper extremity was prepped and draped in the standard sterile fashion. The patient finished a course of pre-operative antibiotics by way of 2 g of IV Ancef. A time out was performed during which verification of surgical site, patient identification, and surgical procedure planned were confirmed by the circulating nurse and anesthetic team.    A 12 cm deltopectoral incision was made. The cephalic vein was identified and protected medially. The subdeltoid, subcoracoid and subacromial spaces were developed and released. The axillary nerve was identified and protected. The biceps was torn and ossified in the middle of the bicipital groove. The biceps tendon was transferred to the pectoralis major tendon using two number 2 ethibond sutures to tenodesis to the upper border of the pectoralis major and then resected the proximal section. The subscapularis relatively thin and was released off of the lesser tuberosity, and the humeral head was atraumatically dislocated. Hypertrophic osteophytes were removed from the proximal humerus. An osteotomy of the remaining humeral head was made in 30 degrees of retroversion using the appropriate jig. The posterosuperior rotator cuff was torn and significantly ossified. My attention was directed to the glenoid. The axillary nerve was identified and protected. The tug test was normal.  A 360 degree froylan-glenoid release was then performed. There was significant central glenoid bone wear.   The position for the centering hole was identified along the inferior half of the glenoid using the anatomic center line and drilled using a 2.5mm drill bit. The depth measured 18 mm. A 6.5mm tap was then inserted which served as a guide for cannulated reamers. Excellent purchase was noted on the tap. Sequential cannulated glenoid reamers were then used to prepare the glenoid, obtaining 90% coverage. To avoid overreaming medially I did not see to ream for the 2 achieve 100% coverage. The tap was removed, and the baseplate was inserted to achieve maximum compression on the glenoid. 4 additional 5 mm locking locking screws were placed after appropriate  holes measuring 22 mm, 18 mm, 14 mm, and 14 mm were drilled using a 4.0 mm drill. The periphery of the baseplate was co-planed and potential impingement points resected. A 32 Neutral glenosphere component was then impacted onto the baseplate morise taper. The morise taper was then tested and ensured to be properly engaged. The retention screw was then placed. Attention was focused back on the proximal humerus. The metaphyseal reamer was used to shape the proximal Humerus. The canal was hand sounded to 8 mm. Four #2 fiberwire sutures were placed through bone tunnels in the lesser tuberosity to repair the subscapularis. Bone graft from the resected humeral head was placed into the graft hole on the implant and into the humeral canal.  A size 10 Altivate humeral stem was then impacted in 30 degrees of retroversion but demonstrates some rotational instability. Consequently I applied some cement to the proximal section of the stem. The implant was then fully seated and held in place while the cement cured. The 32 standard trial liner was inserted, the shoulder reduced and taken through a range of motion demonstrating forward elevation of 140, 40 of ER, and appropriate stability.   The trial was removed, and the final 32 standard polyethylene was impacted on to the stem and

## 2022-02-10 NOTE — BRIEF OP NOTE
Brief Postoperative Note      Patient: Manuela Quijano  YOB: 1955  MRN: [id number]    Date of Procedure: 2/10/2022    Pre-Op Diagnosis: RIGHT GLENOHUMERAL OSTEOARTHRITIS  PT FULLY VACCINATED    Post-Op Diagnosis: Same       Procedure(s):  SHOULDER REVERSE TOTAL ARTHROPLASTY    Surgeon(s):  Annika Navarro MD    Assistant:  Resident: Myrna Petit DO    Anesthesia: General    Estimated Blood Loss (mL): 745 mL    Complications: None    Specimens:   * No specimens in log *    Implants:  Implant Name Type Inv.  Item Serial No.  Lot No. LRB No. Used Action   BASEPLATE ALVIN 30 MM P2 COAT [facility] - [id number]  BASEPLATE ALVIN 30 MM P2 COAT [facility] [id number] Right 1 Implanted   SCREW BNE L14MM DIA5MM TI NONLOCKING FOR ALVIN BASEPLT FIX - [id number]  SCREW BNE L14MM DIA5MM TI NONLOCKING FOR ALVIN BASEPLT FIX  [facility] [id number] Right 1 Implanted   SCREW BNE L14MM DIA5MM TI NONLOCKING FOR ALVIN BASEPLT FIX - [id number]  SCREW BNE L14MM DIA5MM TI NONLOCKING FOR ALVIN BASEPLT FIX  [facility] [id number] Right 1 Implanted   SCREW BNE L18MM DIA5MM TI NONLOCKING FOR ALVIN BASEPLT FIX - [id number]  SCREW BNE L18MM DIA5MM TI NONLOCKING FOR ALVIN BASEPLT FIX  [facility] [id number] Right 1 Implanted   SCREW BNE L22MM DIA5MM TI NONLOCKING FOR ALVIN BASEPLT FIX - [id number]  SCREW BNE L22MM DIA5MM TI NONLOCKING FOR ALVIN BASEPLT FIX  [facility] [id number] Right 1 Implanted   SPHERE ALVIN [id number] NEUT W/ RET SCR [facility] - [id number]  SPHERE ALVIN [id number] NEUT W/ RET SCR [facility] [id number] Right 1 Implanted   STEM HUM H108MM [id number] SM SHELL ALTIVATE REV - [id number]  STEM HUM H108MM [id number] SM SHELL ALTIVATE REV   ORTHOPEDICS [facility] [id number] Right 1 Implanted   INSERT HUM SM SOCKET STD 32 MM SHLDR E+ POLYETH ALTIVATE RVS - [id number]  INSERT HUM SM SOCKET STD 32 MM SHLDR E+ POLYETH [employer] [id number] Right 1 Implanted   CEMENT BNE 20ML 41GM FULL DOSE PMMA W/ Anne Brink VISC RADPQ - [id number]  CEMENT BNE 20ML 41GM FULL DOSE PMMA W/ TOBRA M VISC RADPQ  [employer] HOWM-WD [id number] Right 1 Implanted         Drains: * No LDAs found *    Findings: See operative report    Electronically signed by Tony Turner MD on 2/10/2022 at 10:11 AM

## 2022-02-10 NOTE — INTERVAL H&P NOTE
Update History & Physical    The patient's History and Physical of January 28, 2022 was reviewed with the patient and I examined the patient. There was no change. Pt c/o pain 10/10 if she moves her shoulder. She c/o severe anxiety r/t today. Physical exam remains unchanged. Npo since midnight ,no meds taken  Pt does not wear dentures. Pt denies any hx of MRSA infection  Pt not currently taking any blood thinners or anticoagulants  Pt does have a hx of PONV  Pt denies any acute symptoms of illness at this time including no SOB, CP, fever, URI or UTI symptoms.     Electronically signed by RENAN Dawkins CNP on 2/10/2022 at 5:46 AM

## 2022-02-10 NOTE — ANESTHESIA PRE PROCEDURE
Department of Anesthesiology  Preprocedure Note       Name:  Fabio Gagnon   Age:  78 y.o.  :  1955                                          MRN:  [id number]         Date:  2/10/2022      Surgeon: Bettina Waite):  Issa Gu MD    Procedure: Procedure(s):  SHOULDER TOTAL ARTHROPLASTY    Medications prior to admission:   Prior to Admission medications    Medication Sig Start Date End Date Taking? Authorizing Provider   potassium chloride (KLOR-CON M) 10 MEQ extended release tablet Take 1 tablet by mouth 2 times daily for 5 days 1/29/22 2/10/22 Yes Amy Martinez APRN - CNP   DULoxetine (CYMBALTA) 60 MG extended release capsule Take 1 capsule by mouth daily 22 Yes Amy Martinez APRN - TEN   oxyCODONE-acetaminophen (PERCOCET) 5-325 MG per tablet TAKE 1 TABLET BY MOUTH EVERY 8 TO 12 HOURS AS NEEDED .  DO NOT EXCEED 3 PER 24 HOURS 21  Yes Historical Provider, MD   spironolactone-hydroCHLOROthiazide (ALDACTAZIDE) 25-25 MG per tablet Take 1 tablet by mouth daily 21 Yes Amy Martinez APRN - CNP   celecoxib (CELEBREX) 100 MG capsule Take 1 capsule by mouth 2 times daily 10/13/21 10/13/22 Yes Amy Martinez APRN - CNP   diclofenac sodium (VOLTAREN) 1 % GEL Apply 2 g topically 2 times daily 21  Yes Amy Martinez APRN - CNP   B Complex-Biotin-FA (B-COMPLEX PO) Take 1 tablet by mouth daily   Yes Historical Provider, MD   tiZANidine (ZANAFLEX) 4 MG tablet take 1 tablet by mouth every 8 hours if needed for BACK SPASMS 21 Yes RENAN Singer CNP   NONFORMULARY Take 1 tablet by mouth 2 times daily Viviscal   Yes Historical Provider, MD   Multiple Vitamins-Minerals (CENTRUM SILVER 50+WOMEN) TABS Take 1 tablet by mouth daily   Yes Historical Provider, MD   Coenzyme Q10 (COQ10) 100 MG CAPS Take 1 tablet by mouth 2 times daily   Yes Historical Provider, MD   Omega 3-6-9 Fatty Acids (OMEGA 3-6-9 COMPLEX) CAPS Take 2 tablets by mouth 2 times daily   Yes Historical Provider, MD   triamcinolone (NASACORT ALLERGY 24HR) 55 MCG/ACT nasal inhaler 2 sprays by Each Nostril route daily 12/4/19  Yes RENAN Jimenez CNP   acetaminophen (TYLENOL) 325 MG tablet Take 325 mg by mouth every 6 hours as needed for Pain   Yes Historical Provider, MD   ondansetron (ZOFRAN) 4 MG tablet Take 1 tablet by mouth daily as needed for Nausea or Vomiting 2/2/22   Fransisco Lewis MD   ketorolac (TORADOL) 10 MG tablet Take 1 tablet by mouth every 8 hours as needed for Pain 2/2/22 2/5/22  Fransisco Lewis MD   dicyclomine (BENTYL) 10 MG capsule Take 1 capsule by mouth daily as needed (spasms) 4/23/21   RENAN Rivera CNP   Tens Unit MISC by Does not apply route    Historical Provider, MD   Handicap Placard MISC by Does not apply route Expires in 5 years.  12/4/2024 12/4/19   RENAN Jimenez CNP       Current medications:    Current Facility-Administered Medications   Medication Dose Route Frequency Provider Last Rate Last Admin    lactated ringers infusion   IntraVENous Continuous Sandra Olivares  mL/hr at 02/10/22 0626 New Bag at 02/10/22 0626    sodium chloride flush 0.9 % injection 10 mL  10 mL IntraVENous 2 times per day Sandra Olivares MD        sodium chloride flush 0.9 % injection 10 mL  10 mL IntraVENous PRN Sandra Olivares MD        0.9 % sodium chloride infusion  25 mL IntraVENous PRN Sandra Olivares MD        lidocaine PF 1 % injection 1 mL  1 mL IntraDERmal Once PRN Sandra Olivares MD        bupivacaine (PF) (MARCAINE) 0.5 % injection 50 mg  10 mL Infiltration Once Sandra Olivares MD        bupivacaine liposome (EXPAREL) 1.3 % injection 133 mg  10 mL SubCUTAneous Once Sandra Olivares MD        0.9 % sodium chloride infusion  25 mL IntraVENous PRN Fransisco Lewis MD        ceFAZolin (ANCEF) 2000 mg in dextrose 5 % 50 mL IVPB  2,000 mg IntraVENous On Call to [address], MD        dexamethasone (PF) (DECADRON) injection 10 mg  10 mg IntraVENous Once MD Deyvi Cameron sodium chloride flush 0.9 % injection 10 mL  10 mL IntraVENous 2 times per day Benedicto Sellers MD        sodium chloride flush 0.9 % injection 10 mL  10 mL IntraVENous PRN Benedicto Sellers MD        scopolamine (TRANSDERM-SCOP) transdermal patch 1 patch  1 patch TransDERmal Once Marion Arteaga MD   1 patch at 02/10/22 0122       Allergies: Allergies   Allergen Reactions    Adhesive Tape Other (See Comments)     Patient states that it's like acid it eats my skin away.     Morphine Other (See Comments)     Not sure of reaction just told in the hospital never to take it again       Problem List:    Patient Active Problem List   Diagnosis Code    Essential hypertension I10    Medication therapy management recommendation refused by patient Z53.20    Mixed hyperlipidemia E78.2    Spinal stenosis of lumbar region without neurogenic claudication M48.061    Shoulder arthritis M19.019    Lumbar back pain M54.50    Foraminal stenosis of lumbar region M48.061    Neural foraminal stenosis of lumbar spine M48.061    Osteoarthritis of right glenohumeral joint M19.011    Morbidly obese ([address] E66.01    Preop examination Z01.818       Past Medical History:        Diagnosis Date    Arthritis     Chronic sinusitis     Dental crowns present     Diverticula, colon     Foraminal stenosis of lumbar region 12/04/2019    Hyperlipidemia     Hypertension     Lumbar back pain 12/04/2019    Neural foraminal stenosis of lumbar spine 12/04/2019    Osteoarthritis of right glenohumeral joint     Palpitations     PONV (postoperative nausea and vomiting)     Spinal stenosis     Spinal stenosis of lumbar region without neurogenic claudication 12/04/2019       Past Surgical History:        Procedure Laterality Date    CHOLECYSTECTOMY, OPEN  1976    COLONOSCOPY      HIP SURGERY Right 2007    REPLACEMENT    HYSTERECTOMY      JOINT REPLACEMENT Bilateral 2009    bilat knees and rt hip    LAMINECTOMY  2013    NASAL SEPTUM SURGERY      For deviated septum    SKIN BIOPSY      Neg     TONSILLECTOMY AND ADENOIDECTOMY         Social History:    Social History     Tobacco Use    Smoking status: Never Smoker    Smokeless tobacco: Never Used   Substance Use Topics    Alcohol use: No                                Counseling given: Not Answered      Vital Signs (Current):   Vitals:    02/10/22 0538 02/10/22 0601   BP:  (!) 174/91   Pulse:  88   Resp:  16   Temp:  97.3 °F (36.3 °C)   TempSrc:  Infrared   SpO2:  98%   Weight: 260 lb (117.9 kg)    Height: 5' 7.5\" (1.715 m)                                               BP Readings from Last 3 Encounters:   02/10/22 (!) 174/91   01/28/22 (!) 158/90   01/19/22 130/78       NPO Status: Time of last liquid consumption: 2000                        Time of last solid consumption: 1700                        Date of last liquid consumption: 02/09/22                        Date of last solid food consumption: 02/09/22    BMI:   Wt Readings from Last 3 Encounters:   02/10/22 260 lb (117.9 kg)   02/02/22 260 lb (117.9 kg)   01/28/22 260 lb (117.9 kg)     Body mass index is 40.12 kg/m². CBC:   Lab Results   Component Value Date    WBC 6.3 01/28/2022    RBC 5.06 01/28/2022    HGB 15.4 01/28/2022    HCT 45.1 01/28/2022    MCV 89.2 01/28/2022    RDW 13.3 01/28/2022     01/28/2022     LR    CMP:   Lab Results   Component Value Date     01/28/2022    K 3.9 02/08/2022    CL 98 01/28/2022    CO2 28 01/28/2022    BUN 24 01/28/2022    CREATININE 0.59 01/28/2022    GFRAA >60 01/28/2022    LABGLOM >60 01/28/2022    GLUCOSE 106 01/28/2022    PROT 8.5 04/23/2021    CALCIUM 10.1 01/28/2022    BILITOT 0.59 04/23/2021    ALKPHOS 77 04/23/2021    AST 35 04/23/2021    ALT 22 04/23/2021       POC Tests: No results for input(s): POCGLU, POCNA, POCK, POCCL, POCBUN, POCHEMO, POCHCT in the last 72 hours.     Coags:   Lab Results   Component Value Date    PROTIME 9.9 04/23/2021    INR 0.9 04/23/2021 APTT 25.8 04/23/2021       HCG (If Applicable): No results found for: PREGTESTUR, PREGSERUM, HCG, HCGQUANT     ABGs: No results found for: PHART, PO2ART, KVG3ABG, XAY6JCZ, BEART, M8XEKDWX     Type & Screen (If Applicable):  No results found for: LABABO, LABRH    Drug/Infectious Status (If Applicable):  No results found for: HIV, HEPCAB    COVID-19 Screening (If Applicable): No results found for: COVID19        Anesthesia Evaluation  Patient summary reviewed and Nursing notes reviewed   history of anesthetic complications: PONV. Airway: Mallampati: II  TM distance: >3 FB   Neck ROM: full  Mouth opening: > = 3 FB Dental: normal exam         Pulmonary:Negative Pulmonary ROS and normal exam  breath sounds clear to auscultation      (-) pneumonia, COPD, asthma, shortness of breath, recent URI, sleep apnea, rhonchi, wheezes, rales, stridor, not a current smoker and no decreased breath sounds          Patient did not smoke on day of surgery. Cardiovascular:  Exercise tolerance: good (>4 METS),   (+) hypertension: no interval change,     (-) valvular problems/murmurs, past MI, CAD, CABG/stent, dysrhythmias,  angina,  CHF, orthopnea, PND,  MUELLER, murmur, weak pulses,  friction rub, systolic click, carotid bruit,  JVD, peripheral edema, no pulmonary hypertension and no hyperlipidemia    ECG reviewed  Rhythm: regular  Rate: normal           Beta Blocker:  Not on Beta Blocker         Neuro/Psych:   Negative Neuro/Psych ROS  (+) neuromuscular disease:,    (-) seizures, TIA, CVA, headaches, psychiatric history and depression/anxiety            GI/Hepatic/Renal: Neg GI/Hepatic/Renal ROS       (-) hiatal hernia, GERD, PUD, hepatitis, liver disease, no renal disease, bowel prep and no morbid obesity       Endo/Other: Negative Endo/Other ROS   (+) no malignancy/cancer.     (-) diabetes mellitus, hypothyroidism, hyperthyroidism, blood dyscrasia, arthritis, no electrolyte abnormalities, no malignancy/cancer Abdominal:             Vascular: negative vascular ROS. - PVD, DVT and PE. Other Findings:             Anesthesia Plan      general and regional     ASA 2       Induction: intravenous. MIPS: Postoperative opioids intended and Prophylactic antiemetics administered. Anesthetic plan and risks discussed with patient. Plan discussed with CRNA.                   Nicholas Diaz MD   2/10/2022

## 2022-02-10 NOTE — ANESTHESIA PROCEDURE NOTES
Peripheral Block    Patient location during procedure: pre-op  Start time: 2/10/2022 6:45 AM  End time: 2/10/2022 6:57 AM  Staffing  Performed: anesthesiologist   Anesthesiologist: Sejal Parkinson MD  Preanesthetic Checklist  Completed: patient identified, IV checked, site marked, risks and benefits discussed, surgical consent, monitors and equipment checked, pre-op evaluation, timeout performed, anesthesia consent given, oxygen available and patient being monitored  Peripheral Block  Patient position: supine  Prep: ChloraPrep  Patient monitoring: cardiac monitor, continuous pulse ox, frequent blood pressure checks and IV access  Block type: Brachial plexus  Laterality: right  Injection technique: single-shot  Guidance: ultrasound guided  Local infiltration: lidocaine  Infiltration strength: 1 %  Dose: 3 mL  Interscalene  Provider prep: mask and sterile gloves  Local infiltration: lidocaine  Needle  Needle type: short-bevel   Needle gauge: 22 G  Needle length: 5 cm  Needle localization: ultrasound guidance  Test dose: negative  Assessment  Injection assessment: negative aspiration for heme, no paresthesia on injection and local visualized surrounding nerve on ultrasound  Paresthesia pain: none  Slow fractionated injection: yes  Hemodynamics: stable  Reason for block: post-op pain management and at surgeon's request

## 2022-02-23 ENCOUNTER — OFFICE VISIT (OUTPATIENT)
Dept: ORTHOPEDIC SURGERY | Age: 67
End: 2022-02-23

## 2022-02-23 VITALS — WEIGHT: 260 LBS | BODY MASS INDEX: 40.81 KG/M2 | HEIGHT: 67 IN

## 2022-02-23 DIAGNOSIS — Z96.611 STATUS POST REVERSE TOTAL REPLACEMENT OF RIGHT SHOULDER: Primary | ICD-10-CM

## 2022-02-23 PROCEDURE — 99024 POSTOP FOLLOW-UP VISIT: CPT | Performed by: ORTHOPAEDIC SURGERY

## 2022-02-23 NOTE — PROGRESS NOTES
Procedure: Right Reverse Shoulder Arthroplasty  Date of Procedure: 2/10/22    HPI: Jung Macias is approximately 2 weeks status post the aforementioned procedure. she is doing relatively well clinically. her pain is appropriately controlled. she has been compliant with his sling/abduction pillow and pendulum exercises. she denies having any fevers, chills, sweats, or constitutional symptoms. Physical examination:  Ht 5' 7\" (1.702 m)   Wt 260 lb (117.9 kg)   BMI 40.72 kg/m²   General Appearance: alert, well appearing, and in no distress  Mental Status: alert, oriented to person, place, and time  Evaluation of the right shoulder and upper extremity demonstrates her shoulder incision to be clean, dry, and intact. No wound dehiscence or active drainage is appreciated. There is mild to moderate amount of swelling present. Sensation is grossly intact light touch in all dermatomes and she has a 2+ radial pulse with brisk capillary refill in all her fingers. she can actively flex and extend the wrist and flex, extend, abduct, and adduct all of her fingers. Imaging Studies: 4 x-ray views of the right shoulder completed on 2/23/2022  were independently reviewed demonstrating a reverse shoulder implant to be in acceptable alignment and well fixed without any obvious fracture, dislocation, or subluxation. Impression and plan: Jung Macias is approximately 2 weeks status post a right reverse shoulder arthroplasty. she is doing relatively well clinically at this time. Sutures were taken out today and Steri-Strips and clean dressings applied. she is to continue with daily dressing changes. she was switched from the sling/abduction pillow to a shoulder immobilizer. she will continue with immobilization and pendulum exercises for the next 4 weeks. I'll have her follow up in 4 weeks for re-evaluation, but she was instructed to return or call earlier with any questions or concerns.

## 2022-02-27 PROBLEM — Z01.818 PREOP EXAMINATION: Status: RESOLVED | Noted: 2022-01-28 | Resolved: 2022-02-27

## 2022-03-18 ENCOUNTER — OFFICE VISIT (OUTPATIENT)
Dept: ORTHOPEDIC SURGERY | Age: 67
End: 2022-03-18

## 2022-03-18 VITALS — WEIGHT: 260 LBS | BODY MASS INDEX: 40.81 KG/M2 | HEIGHT: 67 IN

## 2022-03-18 DIAGNOSIS — Z96.611 STATUS POST REVERSE TOTAL REPLACEMENT OF RIGHT SHOULDER: Primary | ICD-10-CM

## 2022-03-18 PROCEDURE — 99024 POSTOP FOLLOW-UP VISIT: CPT | Performed by: ORTHOPAEDIC SURGERY

## 2022-03-18 NOTE — PROGRESS NOTES
Procedure: Right Reverse Shoulder Arthroplasty  Date of Procedure: 2/10/22    HPI: Arvin Wilson is approximately 6 weeks status post the aforementioned procedure. she is doing relatively well clinically. her pain is appropriately controlled and minimal. she has been compliant with her immobilizer and pendulum exercises. she denies having any fevers, chills, or sweats. Physical examination:  Ht 5' 7\" (1.702 m)   Wt 260 lb (117.9 kg)   BMI 40.72 kg/m²   General Appearance: alert, well appearing, and in no distress  Mental Status: alert, oriented to person, place, and time  Evaluation of the right shoulder and upper extremity demonstrates her shoulder incision to be healed appropriately. No wound dehiscence, drainage, warmth or erythema is appreciated. Sensation is grossly intact light touch in all dermatomes and she has a 2+ radial pulse with brisk capillary refill in all her fingers. she can actively flex and extend the wrist and flex, extend, abduct, and adduct all of her fingers. Imaging Studies: 4 x-ray views of the right shoulder completed on 3/18/2022 were independently reviewed demonstrating a reverse shoulder implant to be in acceptable alignment and well fixed without any obvious fracture, dislocation, or subluxation. Impression and plan: Arvin Wilson is approximately 6 weeks status post a right reverse shoulder arthroplasty. she is doing relatively well clinically at this time. She is to discontinue the shoulder immobilizer. I'll have her begin phase 2 shoulder exercises focusing on active assisted flexion and external rotation. she may start to use the arm for light activities of daily living, without any pushing, pulling, or lifting more than one pound. she is to follow up in 6 weeks for re-evaluation, but she was instructed to return or call earlier with any questions or concerns.

## 2022-03-28 ENCOUNTER — TELEPHONE (OUTPATIENT)
Dept: ORTHOPEDIC SURGERY | Age: 67
End: 2022-03-28

## 2022-03-28 DIAGNOSIS — Z96.611 STATUS POST REVERSE TOTAL REPLACEMENT OF RIGHT SHOULDER: Primary | ICD-10-CM

## 2022-03-28 DIAGNOSIS — Z79.2 PROPHYLACTIC ANTIBIOTIC: ICD-10-CM

## 2022-03-28 RX ORDER — AMOXICILLIN 500 MG/1
500 TABLET, FILM COATED ORAL ONCE
Qty: 6 TABLET | Refills: 0 | Status: SHIPPED | OUTPATIENT
Start: 2022-03-28 | End: 2022-03-28

## 2022-03-28 NOTE — TELEPHONE ENCOUNTER
Patient called and stated [address] in [facility] is ok. Please return her call to [phone] if necessary. Thank you.

## 2022-03-28 NOTE — TELEPHONE ENCOUNTER
Recieved call from patient dentist regarding patients need for ATB. Advised her that patient will be given ATB to take day of appt and 2-3 days  After. Office stated that they will fax a form to be filled out and needs to be faxed. Patient  Dentist appt is 4/8/22.   DOS 2/10/22-Rt Tera JOHN for to inform patient of directions and confirm pharmacy

## 2022-04-13 DIAGNOSIS — M48.061 NEURAL FORAMINAL STENOSIS OF LUMBAR SPINE: ICD-10-CM

## 2022-04-13 DIAGNOSIS — I10 ESSENTIAL HYPERTENSION: Chronic | ICD-10-CM

## 2022-04-13 RX ORDER — CELECOXIB 100 MG/1
100 CAPSULE ORAL 2 TIMES DAILY
Qty: 180 CAPSULE | Refills: 1 | Status: SHIPPED | OUTPATIENT
Start: 2022-04-13 | End: 2022-10-10

## 2022-04-13 RX ORDER — SPIRONOLACTONE AND HYDROCHLOROTHIAZIDE 25; 25 MG/1; MG/1
1 TABLET ORAL DAILY
Qty: 90 TABLET | Refills: 1 | Status: SHIPPED | OUTPATIENT
Start: 2022-04-13 | End: 2022-10-10

## 2022-04-13 NOTE — TELEPHONE ENCOUNTER
Last visit: 01/19/22  Last Med refill: aldactazide 11/06/21, celebrex 10/13/21  Does patient have enough medication for 72 hours: yes    Next Visit Date:  Future Appointments   Date Time Provider Per Mora   5/4/2022  1:45 PM Primo Gupta MD [address] Maintenance   Topic Date Due    Colorectal Cancer Screen  Never done    Shingles Vaccine (1 of 2) Never done    DEXA (modify frequency per FRAX score)  Never done    Breast cancer screen  06/12/2015    Pneumococcal 65+ years Vaccine (1 - PCV) Never done    Depression Screen  04/23/2022    Creatinine monitoring  01/28/2023    Potassium monitoring  02/08/2023    Diabetes screen  04/23/2024    Lipid screen  04/23/2026    DTaP/Tdap/Td vaccine (2 - Td or Tdap) 11/12/2029    Flu vaccine  Completed    COVID-19 Vaccine  Completed    Hepatitis A vaccine  Aged Out    Hepatitis B vaccine  Aged Out    Hib vaccine  Aged Out    Meningococcal (ACWY) vaccine  Aged Out    Hepatitis C screen  Discontinued       Hemoglobin A1C (%)   Date Value   08/22/2018 5.4             ( goal A1C is < 7)   No results found for: LABMICR  LDL Cholesterol (mg/dL)   Date Value   04/23/2021 203 (H)     LDL Calculated (mg/dL)   Date Value   04/03/2019 212 (A)       (goal LDL is <100)   AST (U/L)   Date Value   04/23/2021 35 (H)     ALT (U/L)   Date Value   04/23/2021 22     BUN (mg/dL)   Date Value   01/28/2022 24 (H)     BP Readings from Last 3 Encounters:   02/10/22 (!) 159/84   02/10/22 (!) 150/90   01/28/22 (!) 158/90          (goal 120/80)    All Future Testing planned in [facility]  Lab Frequency Next Occurrence   CBC Once 63/89/7750   Basic Metabolic Panel Once 40/16/4508   Urinalysis Reflex to Culture Once 01/28/2022   CBC Once 01/28/2022   Comprehensive Metabolic Panel Once 72/34/3108               Patient Active Problem List:     Essential hypertension     Medication therapy management recommendation refused by patient     Mixed hyperlipidemia

## 2022-05-06 ENCOUNTER — OFFICE VISIT (OUTPATIENT)
Dept: ORTHOPEDIC SURGERY | Age: 67
End: 2022-05-06

## 2022-05-06 VITALS — BODY MASS INDEX: 40.81 KG/M2 | HEIGHT: 67 IN | WEIGHT: 260 LBS

## 2022-05-06 DIAGNOSIS — Z96.611 STATUS POST REVERSE TOTAL REPLACEMENT OF RIGHT SHOULDER: Primary | ICD-10-CM

## 2022-05-06 PROCEDURE — 99024 POSTOP FOLLOW-UP VISIT: CPT | Performed by: ORTHOPAEDIC SURGERY

## 2022-05-06 NOTE — PROGRESS NOTES
Procedure: Right Reverse Shoulder Arthroplasty  Date of Procedure: 2/10/22    HPI: Viola Norton is approximately 3 months status post the aforementioned procedure. she is doing well clinically. she reports having no pain at this time and is quite happy as she is able to reach above her head and also sleep in multiple positions without having any pain. she has been compliant with her home exercise program and continues to deny having any fevers, chills, or sweats. Physical examination:  Ht 5' 7\" (1.702 m)   Wt 260 lb (117.9 kg)   BMI 40.72 kg/m²   General Appearance: alert, well appearing, and in no distress  Mental Status: alert, oriented to person, place, and time  Evaluation of the right shoulder and upper extremity demonstrates her shoulder incision to be healed appropriately. No wound dehiscence, drainage, warmth or erythema is appreciated. Sensation is grossly intact light touch in all dermatomes and she has a 2+ radial pulse with brisk capillary refill in all her fingers. ROM: (Degrees)    Right   A P   Left   A P    Elevation  115    Elevation  130   Abduction  100    Abduction  130    ER   45    ER   55   IR   Buttock   IR   T12   90 abd/ER      90 abd/ER     90 abd/IR      90 abd/IR     Crepitation  No    Crepitation  No      Muscle strength:    Right       Left    Deltoid   5    Deltoid   5  Supraspinatus  5    Supraspinatus  5  ER   5    ER   5  IR   5    IR   5    Special tests    Right   Rotator Cuff    Left    n   Painful arc    n   n   Pain with ER    n    n   Neer's     n    n   Hawkin's    n    n   Drop Arm    n  n   Lift off/Belly Press   n  n   ER Lag    n      she can actively flex and extend the wrist and flex, extend, abduct, and adduct all of her fingers.      Imaging Studies: 4 x-ray views of the right shoulder completed on 5/6/2022 were independently reviewed demonstrating a reverse shoulder implant to be in acceptable alignment and well fixed without any obvious fracture, dislocation, or subluxation. Impression and plan: Ariadna Mukherjee is approximately 3 months status post a right reverse shoulder arthroplasty. she is doing well clinically at this time. I'll have her begin phase 3 shoulder exercises focusing on gentle progressive strengthening as needed. she may continue to use the arm for light activities of daily living and gradually advance as tolerated. she is to follow up in 3 months for re-evaluation, but she was instructed to return or call earlier with any questions or concerns.

## 2022-07-08 DIAGNOSIS — M19.011 OSTEOARTHRITIS OF RIGHT GLENOHUMERAL JOINT: ICD-10-CM

## 2022-07-08 RX ORDER — DULOXETIN HYDROCHLORIDE 60 MG/1
60 CAPSULE, DELAYED RELEASE ORAL DAILY
Qty: 90 CAPSULE | Refills: 1 | Status: SHIPPED | OUTPATIENT
Start: 2022-07-08

## 2022-07-08 NOTE — TELEPHONE ENCOUNTER
Request for Duloxitine 60mg    Last script sent: 04/13/2022  Last OV: 01/19/2022  Next Visit Date:  Future Appointments   Date Time Provider Per Abby   8/5/2022 10:15 AM Julieta Noble MD [address] Maintenance   Topic Date Due    Colorectal Cancer Screen  Never done    Shingles vaccine (1 of 2) Never done    DEXA (modify frequency per FRAX score)  Never done    Breast cancer screen  06/12/2015    Pneumococcal 65+ years Vaccine (1 - PCV) Never done    Depression Screen  04/23/2022    Flu vaccine (1) 09/01/2022    Diabetes screen  04/23/2024    Lipids  04/23/2026    DTaP/Tdap/Td vaccine (2 - Td or Tdap) 11/12/2029    COVID-19 Vaccine  Completed    Hepatitis A vaccine  Aged Out    Hepatitis B vaccine  Aged Out    Hib vaccine  Aged Out    Meningococcal (ACWY) vaccine  Aged Out    Hepatitis C screen  Discontinued       Hemoglobin A1C (%)   Date Value   08/22/2018 5.4             ( goal A1C is < 7)   No results found for: LABMICR  LDL Cholesterol (mg/dL)   Date Value   04/23/2021 203 (H)     LDL Calculated (mg/dL)   Date Value   04/03/2019 212 (A)       (goal LDL is <100)   AST (U/L)   Date Value   04/23/2021 35 (H)     ALT (U/L)   Date Value   04/23/2021 22     BUN (mg/dL)   Date Value   01/28/2022 24 (H)     BP Readings from Last 3 Encounters:   02/10/22 (!) 159/84   02/10/22 (!) 150/90   01/28/22 (!) 158/90          (goal 120/80)    All Future Testing planned in [facility]  Lab Frequency Next Occurrence   CBC Once 28/11/3650   Basic Metabolic Panel Once 23/52/9218   Comprehensive Metabolic Panel Once 26/81/6614         Patient Active Problem List:     Essential hypertension     Medication therapy management recommendation refused by patient     Mixed hyperlipidemia     Spinal stenosis of lumbar region without neurogenic claudication     Shoulder arthritis     Lumbar back pain     Foraminal stenosis of lumbar region     Neural foraminal stenosis of lumbar spine parent

## 2022-08-05 ENCOUNTER — OFFICE VISIT (OUTPATIENT)
Dept: ORTHOPEDIC SURGERY | Age: 67
End: 2022-08-05
Payer: MEDICARE

## 2022-08-05 VITALS — RESPIRATION RATE: 14 BRPM | HEIGHT: 67 IN | WEIGHT: 260 LBS | BODY MASS INDEX: 40.81 KG/M2

## 2022-08-05 DIAGNOSIS — Z96.611 STATUS POST REVERSE TOTAL REPLACEMENT OF RIGHT SHOULDER: Primary | ICD-10-CM

## 2022-08-05 PROCEDURE — 3017F COLORECTAL CA SCREEN DOC REV: CPT | Performed by: ORTHOPAEDIC SURGERY

## 2022-08-05 PROCEDURE — G8427 DOCREV CUR MEDS BY ELIG CLIN: HCPCS | Performed by: ORTHOPAEDIC SURGERY

## 2022-08-05 PROCEDURE — 1123F ACP DISCUSS/DSCN MKR DOCD: CPT | Performed by: ORTHOPAEDIC SURGERY

## 2022-08-05 PROCEDURE — 1090F PRES/ABSN URINE INCON ASSESS: CPT | Performed by: ORTHOPAEDIC SURGERY

## 2022-08-05 PROCEDURE — 1036F TOBACCO NON-USER: CPT | Performed by: ORTHOPAEDIC SURGERY

## 2022-08-05 PROCEDURE — 99212 OFFICE O/P EST SF 10 MIN: CPT | Performed by: ORTHOPAEDIC SURGERY

## 2022-08-05 PROCEDURE — G8417 CALC BMI ABV UP PARAM F/U: HCPCS | Performed by: ORTHOPAEDIC SURGERY

## 2022-08-05 PROCEDURE — G8400 PT W/DXA NO RESULTS DOC: HCPCS | Performed by: ORTHOPAEDIC SURGERY

## 2022-08-05 NOTE — PROGRESS NOTES
Procedure: Right Reverse Shoulder Arthroplasty  Date of Procedure: 2/10/22    HPI: Mary Rothman is approximately 6 months status post the aforementioned procedure. She continues to do well at this time. She states that she really has not done any of her exercises but states that she has been very busy and active and she has been able to do everything that she wants to. She denies having any pain in her shoulder and is overall happy with the shoulder. Physical examination:  Resp 14   Ht 5' 7\" (1.702 m)   Wt 260 lb (117.9 kg)   BMI 40.72 kg/m²   General Appearance: alert, well appearing, and in no distress  Mental Status: alert, oriented to person, place, and time  Evaluation of the right shoulder and upper extremity demonstrates her shoulder incision to be healed appropriately. No wound dehiscence, drainage, warmth or erythema is appreciated. Sensation is grossly intact light touch in all dermatomes and she has a 2+ radial pulse with brisk capillary refill in all her fingers. ROM: (Degrees)    Right   A P   Left   A P    Elevation  115 120   Elevation  120   Abduction  115 120   Abduction  110    ER   30 30   ER   45   IR   Buttock   IR   T12   90 abd/ER      90 abd/ER     90 abd/IR      90 abd/IR     Crepitation  No    Crepitation  No      Muscle strength:    Right       Left    Deltoid   5    Deltoid   5  Supraspinatus  3    Supraspinatus  3  ER   5    ER   5  IR   5    IR   5    Special tests    Right   Rotator Cuff    Left    n   Painful arc    n   n   Pain with ER    n    n   Neer's     n    n   Hawkin's    n    n   Drop Arm    n  n   Lift off/Belly Press   n  n   ER Lag    n      she can actively flex and extend the wrist and flex, extend, abduct, and adduct all of her fingers.      Imaging Studies: 4 x-ray views of the right shoulder completed on 8/5/2022 were independently reviewed demonstrating a reverse shoulder implant to be in acceptable alignment and well fixed without any obvious fracture, dislocation, or subluxation. Impression and plan: Paulino Matias is approximately 6 months status post a right reverse shoulder arthroplasty. she is doing well clinically at this time. Again she is happy with her shoulder. I did encourage her to start doing her exercises as I believe it would be beneficial in improving her overall function. She can continue to use this arm as she feels comfortable and I will see her back for reevaluation in 6 months. She may return or call earlier with any questions or concerns.

## 2022-10-09 DIAGNOSIS — M48.061 NEURAL FORAMINAL STENOSIS OF LUMBAR SPINE: ICD-10-CM

## 2022-10-09 DIAGNOSIS — M54.41 ACUTE BILATERAL LOW BACK PAIN WITH BILATERAL SCIATICA: ICD-10-CM

## 2022-10-09 DIAGNOSIS — M54.42 ACUTE BILATERAL LOW BACK PAIN WITH BILATERAL SCIATICA: ICD-10-CM

## 2022-10-10 RX ORDER — CELECOXIB 100 MG/1
100 CAPSULE ORAL 2 TIMES DAILY
Qty: 60 CAPSULE | Refills: 0 | Status: SHIPPED | OUTPATIENT
Start: 2022-10-10

## 2022-10-10 RX ORDER — TIZANIDINE 4 MG/1
TABLET ORAL
Qty: 90 TABLET | Refills: 0 | Status: SHIPPED | OUTPATIENT
Start: 2022-10-10

## 2022-10-10 RX ORDER — DICYCLOMINE HYDROCHLORIDE 10 MG/1
10 CAPSULE ORAL DAILY
Qty: 30 CAPSULE | Refills: 0 | Status: SHIPPED | OUTPATIENT
Start: 2022-10-10

## 2022-10-10 NOTE — TELEPHONE ENCOUNTER
Last visit: 11/17/21  Last Med refill: 4/23/21 bentyl, tizanidine 4/13/22celebrex,  Does patient have enough medication for 72 hours: Yes    Next Visit Date:  Future Appointments   Date Time Provider Per Mora   2/1/2023 11:15 AM Everett Prieto MD [address] Maintenance   Topic Date Due    Colorectal Cancer Screen  Never done    Shingles vaccine (1 of 2) Never done    DEXA (modify frequency per FRAX score)  Never done    Breast cancer screen  06/12/2015    Pneumococcal 65+ years Vaccine (1 - PCV) Never done    COVID-19 Vaccine (4 - Booster for Pfizer series) 03/12/2022    Depression Screen  04/23/2022    Flu vaccine (1) 08/01/2022    Diabetes screen  04/23/2024    Lipids  04/23/2026    DTaP/Tdap/Td vaccine (2 - Td or Tdap) 11/12/2029    Hepatitis A vaccine  Aged Out    Hib vaccine  Aged Out    Meningococcal (ACWY) vaccine  Aged Out    Hepatitis C screen  Discontinued       Hemoglobin A1C (%)   Date Value   08/22/2018 5.4             ( goal A1C is < 7)   No results found for: LABMICR  LDL Cholesterol (mg/dL)   Date Value   04/23/2021 203 (H)     LDL Calculated (mg/dL)   Date Value   04/03/2019 212 (A)       (goal LDL is <100)   AST (U/L)   Date Value   04/23/2021 35 (H)     ALT (U/L)   Date Value   04/23/2021 22     BUN (mg/dL)   Date Value   01/28/2022 24 (H)     BP Readings from Last 3 Encounters:   02/10/22 (!) 159/84   02/10/22 (!) 150/90   01/28/22 (!) 158/90          (goal 120/80)    All Future Testing planned in [facility]  Lab Frequency Next Occurrence   Comprehensive Metabolic Panel Once 92/14/2894               Patient Active Problem List:     Essential hypertension     Medication therapy management recommendation refused by patient     Mixed hyperlipidemia     Spinal stenosis of lumbar region without neurogenic claudication     Shoulder arthritis     Lumbar back pain     Foraminal stenosis of lumbar region     Neural foraminal stenosis of lumbar spine     Osteoarthritis of right glenohumeral joint     Morbidly obese ([address]

## 2022-11-09 ENCOUNTER — PATIENT MESSAGE (OUTPATIENT)
Dept: FAMILY MEDICINE CLINIC | Age: 67
End: 2022-11-09

## 2022-11-09 DIAGNOSIS — I10 ESSENTIAL HYPERTENSION: Chronic | ICD-10-CM

## 2022-11-09 DIAGNOSIS — M48.061 NEURAL FORAMINAL STENOSIS OF LUMBAR SPINE: ICD-10-CM

## 2022-11-09 RX ORDER — SPIRONOLACTONE AND HYDROCHLOROTHIAZIDE 25; 25 MG/1; MG/1
TABLET ORAL
Qty: 90 TABLET | Refills: 1 | Status: SHIPPED | OUTPATIENT
Start: 2022-11-09

## 2022-11-09 NOTE — TELEPHONE ENCOUNTER
Last visit: 1/19/22  Last Med refill: 4/13/22  Does patient have enough medication for 72 hours: Yes    Next Visit Date:  Future Appointments   Date Time Provider Per Mora   2/1/2023 11:15 AM Kyle Denis MD [address] Maintenance   Topic Date Due    Colorectal Cancer Screen  Never done    Shingles vaccine (1 of 2) Never done    DEXA (modify frequency per FRAX score)  Never done    Breast cancer screen  06/12/2015    Pneumococcal 65+ years Vaccine (1 - PCV) Never done    COVID-19 Vaccine (4 - Booster for Pfizer series) 01/07/2022    Depression Screen  04/23/2022    Flu vaccine (1) 08/01/2022    Diabetes screen  04/23/2024    Lipids  04/23/2026    DTaP/Tdap/Td vaccine (2 - Td or Tdap) 11/12/2029    Hepatitis A vaccine  Aged Out    Hib vaccine  Aged Out    Meningococcal (ACWY) vaccine  Aged Out    Hepatitis C screen  Discontinued       Hemoglobin A1C (%)   Date Value   08/22/2018 5.4             ( goal A1C is < 7)   No results found for: LABMICR  LDL Cholesterol (mg/dL)   Date Value   04/23/2021 203 (H)     LDL Calculated (mg/dL)   Date Value   04/03/2019 212 (A)       (goal LDL is <100)   AST (U/L)   Date Value   04/23/2021 35 (H)     ALT (U/L)   Date Value   04/23/2021 22     BUN (mg/dL)   Date Value   01/28/2022 24 (H)     BP Readings from Last 3 Encounters:   02/10/22 (!) 159/84   02/10/22 (!) 150/90   01/28/22 (!) 158/90          (goal 120/80)    All Future Testing planned in [facility]  Lab Frequency Next Occurrence   Comprehensive Metabolic Panel Once 57/49/1854               Patient Active Problem List:     Essential hypertension     Medication therapy management recommendation refused by patient     Mixed hyperlipidemia     Spinal stenosis of lumbar region without neurogenic claudication     Shoulder arthritis     Lumbar back pain     Foraminal stenosis of lumbar region     Neural foraminal stenosis of lumbar spine     Osteoarthritis of right glenohumeral joint     Morbidly

## 2022-11-10 RX ORDER — CELECOXIB 100 MG/1
100 CAPSULE ORAL 2 TIMES DAILY
Qty: 180 CAPSULE | Refills: 3 | Status: SHIPPED | OUTPATIENT
Start: 2022-11-10 | End: 2023-11-10

## 2022-11-10 RX ORDER — SPIRONOLACTONE AND HYDROCHLOROTHIAZIDE 25; 25 MG/1; MG/1
TABLET ORAL
Qty: 90 TABLET | Refills: 1 | OUTPATIENT
Start: 2022-11-10

## 2022-11-10 NOTE — TELEPHONE ENCOUNTER
From: Jose Antonio Caputo  Sent: 11/9/2022 12:39 PM EST  To: [facility] Clinical Support Pool  Subject: medications    I have an appt for Turesday 11/15 but will run out of my BP meds and Celebrex. Are you willing to send my scripts in? If you do please send them to [facility] for 90 day supply. Let me know.  TY

## 2022-11-10 NOTE — TELEPHONE ENCOUNTER
Orders Pended for editing and approval.    The Spironolactone-hydrochlorothiazide was sent 11/9/2022 to [address] [place] [address] in [facility]. The correct pharmacy has been selected with pended orders.

## 2022-11-15 ENCOUNTER — OFFICE VISIT (OUTPATIENT)
Dept: FAMILY MEDICINE CLINIC | Age: 67
End: 2022-11-15
Payer: MEDICARE

## 2022-11-15 VITALS
WEIGHT: 270 LBS | HEART RATE: 80 BPM | SYSTOLIC BLOOD PRESSURE: 144 MMHG | DIASTOLIC BLOOD PRESSURE: 88 MMHG | OXYGEN SATURATION: 97 % | RESPIRATION RATE: 18 BRPM | TEMPERATURE: 97.2 F | BODY MASS INDEX: 42.29 KG/M2

## 2022-11-15 DIAGNOSIS — Z12.12 SCREENING FOR COLORECTAL CANCER: ICD-10-CM

## 2022-11-15 DIAGNOSIS — Z78.0 ASYMPTOMATIC MENOPAUSAL STATE: ICD-10-CM

## 2022-11-15 DIAGNOSIS — M48.061 SPINAL STENOSIS OF LUMBAR REGION WITHOUT NEUROGENIC CLAUDICATION: ICD-10-CM

## 2022-11-15 DIAGNOSIS — E87.6 HYPOKALEMIA: ICD-10-CM

## 2022-11-15 DIAGNOSIS — Z00.00 INITIAL MEDICARE ANNUAL WELLNESS VISIT: Primary | ICD-10-CM

## 2022-11-15 DIAGNOSIS — Z12.11 SCREENING FOR COLORECTAL CANCER: ICD-10-CM

## 2022-11-15 DIAGNOSIS — E78.2 MIXED HYPERLIPIDEMIA: ICD-10-CM

## 2022-11-15 DIAGNOSIS — Z12.31 ENCOUNTER FOR SCREENING MAMMOGRAM FOR BREAST CANCER: ICD-10-CM

## 2022-11-15 DIAGNOSIS — I10 ESSENTIAL HYPERTENSION: ICD-10-CM

## 2022-11-15 DIAGNOSIS — Z23 NEED FOR VACCINATION: ICD-10-CM

## 2022-11-15 DIAGNOSIS — E66.01 CLASS 3 SEVERE OBESITY DUE TO EXCESS CALORIES WITHOUT SERIOUS COMORBIDITY WITH BODY MASS INDEX (BMI) OF 40.0 TO 44.9 IN ADULT (HCC): ICD-10-CM

## 2022-11-15 PROBLEM — M51.369 DEGENERATION OF LUMBAR INTERVERTEBRAL DISC: Status: ACTIVE | Noted: 2022-11-15

## 2022-11-15 PROBLEM — M51.36 DEGENERATION OF LUMBAR INTERVERTEBRAL DISC: Status: ACTIVE | Noted: 2022-11-15

## 2022-11-15 PROCEDURE — G8417 CALC BMI ABV UP PARAM F/U: HCPCS | Performed by: NURSE PRACTITIONER

## 2022-11-15 PROCEDURE — G0008 ADMIN INFLUENZA VIRUS VAC: HCPCS | Performed by: NURSE PRACTITIONER

## 2022-11-15 PROCEDURE — 90694 VACC AIIV4 NO PRSRV 0.5ML IM: CPT | Performed by: NURSE PRACTITIONER

## 2022-11-15 PROCEDURE — 1036F TOBACCO NON-USER: CPT | Performed by: NURSE PRACTITIONER

## 2022-11-15 PROCEDURE — G8427 DOCREV CUR MEDS BY ELIG CLIN: HCPCS | Performed by: NURSE PRACTITIONER

## 2022-11-15 PROCEDURE — 99214 OFFICE O/P EST MOD 30 MIN: CPT | Performed by: NURSE PRACTITIONER

## 2022-11-15 PROCEDURE — 3078F DIAST BP <80 MM HG: CPT | Performed by: NURSE PRACTITIONER

## 2022-11-15 PROCEDURE — G8484 FLU IMMUNIZE NO ADMIN: HCPCS | Performed by: NURSE PRACTITIONER

## 2022-11-15 PROCEDURE — 1123F ACP DISCUSS/DSCN MKR DOCD: CPT | Performed by: NURSE PRACTITIONER

## 2022-11-15 PROCEDURE — 3017F COLORECTAL CA SCREEN DOC REV: CPT | Performed by: NURSE PRACTITIONER

## 2022-11-15 PROCEDURE — 3074F SYST BP LT 130 MM HG: CPT | Performed by: NURSE PRACTITIONER

## 2022-11-15 PROCEDURE — G0438 PPPS, INITIAL VISIT: HCPCS | Performed by: NURSE PRACTITIONER

## 2022-11-15 PROCEDURE — G8400 PT W/DXA NO RESULTS DOC: HCPCS | Performed by: NURSE PRACTITIONER

## 2022-11-15 PROCEDURE — 1090F PRES/ABSN URINE INCON ASSESS: CPT | Performed by: NURSE PRACTITIONER

## 2022-11-15 RX ORDER — DICYCLOMINE HYDROCHLORIDE 10 MG/1
10 CAPSULE ORAL DAILY
Qty: 30 CAPSULE | Refills: 11 | Status: SHIPPED | OUTPATIENT
Start: 2022-11-15 | End: 2023-11-15

## 2022-11-15 RX ORDER — DULOXETIN HYDROCHLORIDE 30 MG/1
30 CAPSULE, DELAYED RELEASE ORAL NIGHTLY
Qty: 90 CAPSULE | Refills: 0 | Status: SHIPPED | OUTPATIENT
Start: 2022-11-15

## 2022-11-15 ASSESSMENT — LIFESTYLE VARIABLES
HOW OFTEN DO YOU HAVE A DRINK CONTAINING ALCOHOL: NEVER
HOW MANY STANDARD DRINKS CONTAINING ALCOHOL DO YOU HAVE ON A TYPICAL DAY: PATIENT DOES NOT DRINK

## 2022-11-15 ASSESSMENT — PATIENT HEALTH QUESTIONNAIRE - PHQ9
SUM OF ALL RESPONSES TO PHQ9 QUESTIONS 1 & 2: 0
SUM OF ALL RESPONSES TO PHQ QUESTIONS 1-9: 0
SUM OF ALL RESPONSES TO PHQ QUESTIONS 1-9: 0
2. FEELING DOWN, DEPRESSED OR HOPELESS: 0
SUM OF ALL RESPONSES TO PHQ QUESTIONS 1-9: 0
1. LITTLE INTEREST OR PLEASURE IN DOING THINGS: 0
SUM OF ALL RESPONSES TO PHQ QUESTIONS 1-9: 0

## 2022-11-15 ASSESSMENT — ENCOUNTER SYMPTOMS: BACK PAIN: 1

## 2022-11-15 NOTE — PROGRESS NOTES
overdose. Last PDMP Abby Brown as Reviewed:  Review User Review Instant Review Result   Juan M Gastelum 12/4/2019 11:12 AM     Reviewed PDMP [1]     Last Controlled Substance Monitoring Documentation      [address] Office Visit from 11/15/2022 in [address] [place] [address]   Periodic Controlled Substance Monitoring Obtaining appropriate analgesic effect of treatment. filed at 11/29/2022 1980   Chronic Pain > 50 MEDD Re-evaluated the status of the patient's underlying condition causing pain.  filed at 11/29/2022 [place] and ACP:  General  In general, how would you say your health is?: Fair  In the past 7 days, have you experienced any of the following: New or Increased Pain, New or Increased Fatigue, Loneliness, Social Isolation, Stress or Anger?: No  Do you get the social and emotional support that you need?: Yes  Do you have a Living Will?: (!) No    Advance Directives       Power of [facility] Will ACP-Advance Directive ACP-Power of     Not on File Not on File Not on File Not on File          General Health Risk Interventions:  No Living Will: information provided     Health Habits/Nutrition:  Physical Activity: Insufficiently Active    Days of Exercise per Week: 2 days    Minutes of Exercise per Session: 30 min     Have you lost any weight without trying in the past 3 months?: No     Have you seen the dentist within the past year?: Yes  Health Habits/Nutrition Interventions:  Inadequate physical activity:  patient is not ready to increase his/her physical activity level at this time     Safety:  Do you have working smoke detectors?: Yes  Do you have any tripping hazards - loose or unsecured carpets or rugs?: No  Do you have any tripping hazards - clutter in doorways, halls, or stairs?: No  Do you have either shower bars, grab bars, non-slip mats or non-slip surfaces in your shower or bathtub?: (!) No  Do all of your stairways have a railing or banister?: Not Applicable  Do you always fasten your seatbelt when you are in a car?: Yes         Objective   Vitals:    11/15/22 0938 11/15/22 1051   BP: (!) 150/86 (!) 144/88   Site: Left Upper Arm Left Upper Arm   Position: Sitting Sitting   Cuff Size: Medium Adult Medium Adult   Pulse: 80    Resp: 18    Temp: 97.2 °F (36.2 °C)    TempSrc: Temporal    SpO2: 97%    Weight: 270 lb (122.5 kg)       Body mass index is 42.29 kg/m². Allergies   Allergen Reactions    Adhesive Tape Other (See Comments)     Patient states that it's like acid it eats my skin away. Morphine Other (See Comments)     Not sure of reaction just told in the hospital never to take it again     Prior to Visit Medications    Medication Sig Taking?  Authorizing Provider   Menthol, Topical Analgesic, (ZIMS MAX-FREEZE EX) Apply topically in the morning and at bedtime Yes Historical Provider, MD   dicyclomine (BENTYL) 10 MG capsule Take 1 capsule by mouth daily *patient needs an appointment for additional refills* Yes RENAN Wilcox CNP   DULoxetine (CYMBALTA) 30 MG extended release capsule Take 1 capsule by mouth at bedtime Yes RENAN Wilcox CNP   celecoxib (CELEBREX) 100 MG capsule Take 1 capsule by mouth 2 times daily *patient needs an appointment for additional refills* Yes RENAN Wilcox CNP   spironolactone-hydroCHLOROthiazide (ALDACTAZIDE) 25-25 MG per tablet Take 1 tablet by mouth once daily Yes RENAN Wilcox CNP   tiZANidine (ZANAFLEX) 4 MG tablet TAKE 1 TABLET BY MOUTH EVERY 8 HOURS AS NEEDED FOR BACK SPASMS *patient needs an appointment for additional refills* Yes RENAN Wilcox CNP   DULoxetine (CYMBALTA) 60 MG extended release capsule Take 1 capsule by mouth daily Yes RENAN Wilcox CNP   potassium chloride (KLOR-CON M) 10 MEQ extended release tablet Take 1 tablet by mouth 2 times daily for 5 days Yes RENAN Wilcox CNP   oxyCODONE-acetaminophen (PERCOCET) 5-325 MG per tablet TAKE 1 TABLET BY MOUTH EVERY 8 TO 12 HOURS AS NEEDED . DO NOT EXCEED 3 PER 24 HOURS Yes Historical Provider, MD   B Complex-Biotin-FA (B-COMPLEX PO) Take 1 tablet by mouth daily Yes Historical Provider, MD   NONFORMULARY Take 1 tablet by mouth 2 times daily Viviscal Yes Historical Provider, MD   Tens Unit [address] [facility] by Does not apply route Yes Historical Provider, MD   Multiple Vitamins-Minerals (CENTRUM SILVER 50+WOMEN) TABS Take 1 tablet by mouth daily Yes Historical Provider, MD   Coenzyme Q10 (COQ10) 100 MG CAPS Take 1 tablet by mouth 2 times daily Yes Historical Provider, MD   Omega 3-6-9 Fatty Acids (OMEGA 3-6-9 COMPLEX) CAPS Take 2 tablets by mouth 2 times daily Yes Historical Provider, MD   triamcinolone (NASACORT ALLERGY 24HR) 55 MCG/ACT nasal inhaler 2 sprays by Each Nostril route daily Yes RENAN Brown CNP   Handicap Placard MISC by Does not apply route Expires in 5 years.  12/4/2024 Yes RENAN Brown CNP   acetaminophen (TYLENOL) 325 MG tablet Take 325 mg by mouth every 6 hours as needed for Pain Yes Historical Provider, MD Ji (Including outside providers/suppliers regularly involved in providing care):   Patient Care Team:  RENAN Rodríguez CNP as PCP - General (Nurse Practitioner Family)  RENAN Rodríguez CNP as PCP - [facility] Empaneled Provider  Jaspreet Ho MD as Consulting Physician (Orthopedic Surgery)     Reviewed and updated this visit:  Tobacco  Allergies  Meds  Problems  Med Hx  Surg Hx  Soc Hx  Fam Hx

## 2022-11-15 NOTE — PROGRESS NOTES
[address]  [phone]    11/15/22     Patient ID  Jacey Khan is a 75 y.o. female  Established patient    Chief Complaint  Jacey Khan presents today for Medicare AWV, Hypertension, Cholesterol Problem, and Back Pain      ASSESSMENT/PLAN  1. Initial Medicare annual wellness visit  2. Asymptomatic menopausal state  -     DEXA Bone Density Axial Skeleton; Future  3. Encounter for screening mammogram for breast cancer  -     DAVIAN Digital Screen Bilateral; Future  4. Screening for colorectal cancer  -     FIT-DNA (Cologuard)  5. Essential hypertension  -     CBC with Auto Differential; Future  -     TSH with Reflex; Future  -     Urinalysis with Reflex to Culture; Future  -     PTH, Intact; Future  6. Mixed hyperlipidemia  -     Lipid Panel; Future  7. Spinal stenosis of lumbar region without neurogenic claudication  8. Hypokalemia  -     Comprehensive Metabolic Panel, Fasting; Future  9. Class 3 severe obesity due to excess calories without serious comorbidity with body mass index (BMI) of 40.0 to 44.9 in adult (HCC)  -     CBC with Auto Differential; Future  -     TSH with Reflex; Future  -     Urinalysis with Reflex to Culture; Future  10. Need for vaccination  -     Influenza, FLUAD, (age 72 y+), IM, Preservative Free, 0.5 mL     No changes in pharmaology. Labs and HM ordered       Return in 6 months (on 5/15/2023) for Medicare Annual Wellness Visit in 1 year.       Patient Care Team:  RENAN Coulter CNP as PCP - General (Nurse Practitioner Family)  RENAN Coulter CNP as PCP [facility]  Alen Guerrier MD as Consulting Physician (Orthopedic Surgery)    SUBJECTIVE/OBJECTIVE  History of Present illness / Visit Summary   Patient presents today for follow up with AWV   Reviewed health maintenance and any recent labs/imaging      8/5/2022 ortho -   Impression and plan: Jacey Khan is approximately 6 months status post a right reverse shoulder arthroplasty. she is doing well clinically at this time. Again she is happy with her shoulder. I did encourage her to start doing her exercises as I believe it would be beneficial in improving her overall function. She can continue to use this arm as she feels comfortable and I will see her back for reevaluation in 6 months. She may return or call earlier with any questions or concerns. Review of Systems  Review of Systems   Constitutional:  Positive for fatigue. Negative for activity change, appetite change, chills and fever. HENT:  Negative for congestion and dental problem. Current dental exams    Eyes:         Current eye exams    Respiratory:  Negative for cough, chest tightness and shortness of breath. Cardiovascular:  Negative for chest pain, palpitations and leg swelling. Gastrointestinal:  Negative for abdominal distention, abdominal pain, blood in stool, constipation, diarrhea and nausea. Genitourinary:  Positive for frequency and urgency. Negative for difficulty urinating, dysuria and hematuria. Mixed incontinence  Wears pad    Musculoskeletal:  Positive for arthralgias (bilateral hip, knees, shoulders), back pain, gait problem and myalgias. Following with Orthopedic and pain management    Neurological:  Positive for weakness and numbness (BLE). Negative for dizziness, light-headedness and headaches. Psychiatric/Behavioral:  Negative for agitation, decreased concentration, dysphoric mood, self-injury, sleep disturbance and suicidal ideas. The patient is not nervous/anxious. Physical exam   Physical Exam  Vitals and nursing note reviewed. Constitutional:       General: She is not in acute distress. Appearance: Normal appearance. She is well-developed and well-groomed. She is morbidly obese. She is not ill-appearing or toxic-appearing. Cardiovascular:      Rate and Rhythm: Normal rate and regular rhythm.  No extrasystoles are present. Heart sounds: Normal heart sounds, S1 normal and S2 normal. No murmur heard. Pulmonary:      Effort: Pulmonary effort is normal. No prolonged expiration or respiratory distress. Breath sounds: Normal breath sounds and air entry. Musculoskeletal:      Thoracic back: Decreased range of motion. Lumbar back: Spasms and tenderness present. Decreased range of motion. Right lower leg: No edema. Left lower leg: No edema. Skin:     General: Skin is warm and dry. Coloration: Skin is not ashen, cyanotic, jaundiced or pale. Neurological:      Mental Status: She is alert and oriented to person, place, and time. Motor: Motor function is intact. Gait: Gait is intact. Psychiatric:         Attention and Perception: Attention and perception normal.         Mood and Affect: Mood and affect normal.         Speech: Speech normal.         Behavior: Behavior normal. Behavior is cooperative. Thought Content: Thought content normal. Thought content does not include suicidal ideation. Thought content does not include suicidal plan. Cognition and Memory: Cognition and memory normal.         Judgment: Judgment normal.         Electronically signed by RENAN Ramirez CNP, APRN-CNP on 11/29/2022 at 7:23 AM    Please note that this chart was generated using voice recognition Dragon dictation software. Although every effort was made to ensure the accuracy of this automated transcription, some errors in transcription may have occurred.

## 2022-11-15 NOTE — PATIENT INSTRUCTIONS
Personalized Preventive Plan for Waldemar Hudson - 11/15/2022  Medicare offers a range of preventive health benefits. Some of the tests and screenings are paid in full while other may be subject to a deductible, co-insurance, and/or copay. Some of these benefits include a comprehensive review of your medical history including lifestyle, illnesses that may run in your family, and various assessments and screenings as appropriate. After reviewing your medical record and screening and assessments performed today your provider may have ordered immunizations, labs, imaging, and/or referrals for you. A list of these orders (if applicable) as well as your Preventive Care list are included within your After Visit Summary for your review. Other Preventive Recommendations:    A preventive eye exam performed by an eye specialist is recommended every 1-2 years to screen for glaucoma; cataracts, macular degeneration, and other eye disorders. A preventive dental visit is recommended every 6 months. Try to get at least 150 minutes of exercise per week or 10,000 steps per day on a pedometer . Order or download the FREE \"Exercise & Physical Activity: Your Everyday Guide\" from [employer]. Call 6-977.696.9393 or search [employer] online. You need 1366-6416 mg of calcium and 9390-9564 IU of vitamin D per day. It is possible to meet your calcium requirement with diet alone, but a vitamin D supplement is usually necessary to meet this goal.  When exposed to the sun, use a sunscreen that protects against both UVA and UVB radiation with an SPF of 30 or greater. Reapply every 2 to 3 hours or after sweating, drying off with a towel, or swimming. Always wear a seat belt when traveling in a car. Always wear a helmet when riding a bicycle or motorcycle.

## 2022-11-29 ASSESSMENT — ENCOUNTER SYMPTOMS
NAUSEA: 0
BLOOD IN STOOL: 0
COUGH: 0
SHORTNESS OF BREATH: 0
CHEST TIGHTNESS: 0
DIARRHEA: 0
CONSTIPATION: 0
ABDOMINAL PAIN: 0
ABDOMINAL DISTENTION: 0

## 2023-01-15 DIAGNOSIS — M54.42 ACUTE BILATERAL LOW BACK PAIN WITH BILATERAL SCIATICA: Primary | ICD-10-CM

## 2023-01-15 DIAGNOSIS — M19.011 OSTEOARTHRITIS OF RIGHT GLENOHUMERAL JOINT: ICD-10-CM

## 2023-01-15 DIAGNOSIS — M54.41 ACUTE BILATERAL LOW BACK PAIN WITH BILATERAL SCIATICA: Primary | ICD-10-CM

## 2023-01-16 RX ORDER — DULOXETIN HYDROCHLORIDE 60 MG/1
60 CAPSULE, DELAYED RELEASE ORAL DAILY
Qty: 90 CAPSULE | Refills: 1 | Status: SHIPPED | OUTPATIENT
Start: 2023-01-16 | End: 2023-07-15

## 2023-01-16 RX ORDER — DULOXETIN HYDROCHLORIDE 30 MG/1
30 CAPSULE, DELAYED RELEASE ORAL NIGHTLY
Qty: 90 CAPSULE | Refills: 1 | Status: SHIPPED | OUTPATIENT
Start: 2023-01-16 | End: 2023-07-15

## 2023-01-16 NOTE — TELEPHONE ENCOUNTER
LOV 11/15/22  RTO F/U scheduled  LRF 11/15/22 and 7/8/22    Health Maintenance   Topic Date Due    Colorectal Cancer Screen  Never done    Shingles vaccine (1 of 2) Never done    DEXA (modify frequency per FRAX score)  Never done    Breast cancer screen  06/12/2015    Pneumococcal 65+ years Vaccine (1 - PCV) Never done    COVID-19 Vaccine (4 - Booster for Pfizer series) 01/07/2022    Depression Screen  11/15/2023    Diabetes screen  04/23/2024    Lipids  04/23/2026    DTaP/Tdap/Td vaccine (2 - Td or Tdap) 11/12/2029    Flu vaccine  Completed    Hepatitis A vaccine  Aged Out    Hib vaccine  Aged Out    Meningococcal (ACWY) vaccine  Aged Out    Hepatitis C screen  Discontinued             (applicable per patient's age: Cancer Screenings, Depression Screening, Fall Risk Screening, Immunizations)    Hemoglobin A1C (%)   Date Value   08/22/2018 5.4     LDL Cholesterol (mg/dL)   Date Value   04/23/2021 203 (H)     LDL Calculated (mg/dL)   Date Value   04/03/2019 212 (A)     AST (U/L)   Date Value   04/23/2021 35 (H)     ALT (U/L)   Date Value   04/23/2021 22     BUN (mg/dL)   Date Value   01/28/2022 24 (H)      (goal A1C is < 7)   (goal LDL is <100) need 30-50% reduction from baseline     BP Readings from Last 3 Encounters:   11/15/22 (!) 144/88   02/10/22 (!) 159/84   02/10/22 (!) 150/90    (goal /80)      All Future Testing planned in [facility]:  Lab Frequency Next Occurrence   Comprehensive Metabolic Panel Once 67/68/6249   DAVIAN Digital Screen Bilateral Once 11/15/2022   DEXA Bone Density Axial Skeleton Once 12/15/2022   CBC with Auto Differential Once 11/15/2022   Comprehensive Metabolic Panel, Fasting Once 11/15/2022   Lipid Panel Once 11/15/2022   TSH with Reflex Once 11/15/2022   Urinalysis with Reflex to Culture Once 11/15/2022   PTH, Intact Once 11/15/2022       Next Visit Date:  Future Appointments   Date Time Provider Per Mora   2/1/2023 11:15 AM Antwan Stanford MD [facility]   5/17/2023 10:30 AM Amy Mitchell, RENAN - CNP [facility]            Patient Active Problem List:     Essential hypertension     Medication therapy management recommendation refused by patient     Mixed hyperlipidemia     Spinal stenosis of lumbar region without neurogenic claudication     Shoulder arthritis     Lumbar back pain     Foraminal stenosis of lumbar region     Neural foraminal stenosis of lumbar spine     Osteoarthritis of right glenohumeral joint     Morbidly obese (HCC)     Degeneration of lumbar intervertebral disc

## 2023-01-25 ENCOUNTER — E-VISIT (OUTPATIENT)
Dept: PRIMARY CARE CLINIC | Age: 68
End: 2023-01-25
Payer: MEDICARE

## 2023-01-25 DIAGNOSIS — B96.89 ACUTE BACTERIAL SINUSITIS: Primary | ICD-10-CM

## 2023-01-25 DIAGNOSIS — R06.2 WHEEZING: ICD-10-CM

## 2023-01-25 DIAGNOSIS — J01.90 ACUTE BACTERIAL SINUSITIS: Primary | ICD-10-CM

## 2023-01-25 PROCEDURE — 99421 OL DIG E/M SVC 5-10 MIN: CPT | Performed by: NURSE PRACTITIONER

## 2023-01-25 ASSESSMENT — LIFESTYLE VARIABLES: SMOKING_STATUS: NO, I'VE NEVER SMOKED

## 2023-01-26 RX ORDER — AMOXICILLIN AND CLAVULANATE POTASSIUM 875; 125 MG/1; MG/1
1 TABLET, FILM COATED ORAL 2 TIMES DAILY
Qty: 20 TABLET | Refills: 0 | Status: SHIPPED | OUTPATIENT
Start: 2023-01-26 | End: 2023-02-05

## 2023-01-26 RX ORDER — PREDNISONE 20 MG/1
20 TABLET ORAL 2 TIMES DAILY
Qty: 10 TABLET | Refills: 0 | Status: SHIPPED | OUTPATIENT
Start: 2023-01-26 | End: 2023-01-31

## 2023-03-08 ENCOUNTER — OFFICE VISIT (OUTPATIENT)
Dept: ORTHOPEDIC SURGERY | Age: 68
End: 2023-03-08

## 2023-03-08 VITALS — BODY MASS INDEX: 42.38 KG/M2 | HEIGHT: 67 IN | WEIGHT: 270 LBS | RESPIRATION RATE: 14 BRPM

## 2023-03-08 DIAGNOSIS — Z96.611 STATUS POST REVERSE TOTAL REPLACEMENT OF RIGHT SHOULDER: Primary | ICD-10-CM

## 2023-03-08 NOTE — PROGRESS NOTES
Procedure: Right Reverse Shoulder Arthroplasty  Date of Procedure: 2/10/22    HPI: Caryle Muster is approximately 1 year status post the aforementioned procedure. She is doing very well and really having no pain or problems with her shoulder. She states that at times she even forgets what side she had surgery on. Physical examination:  Resp 14   Ht 5' 7\" (1.702 m)   Wt 270 lb (122.5 kg)   BMI 42.29 kg/m²   General Appearance: alert, well appearing, and in no distress  Mental Status: alert, oriented to person, place, and time  Evaluation of the right shoulder and upper extremity demonstrates her shoulder incision to be healed appropriately. No wound dehiscence, drainage, warmth or erythema is appreciated. Sensation is grossly intact light touch in all dermatomes and she has a 2+ radial pulse with brisk capillary refill in all her fingers. ROM: (Degrees)    Right   A P   Left   A P    Elevation  130    Elevation  120   Abduction  120    Abduction  110    ER   40    ER   45   IR   Buttock   IR   T12   90 abd/ER      90 abd/ER     90 abd/IR      90 abd/IR     Crepitation  No    Crepitation  No      Muscle strength:    Right       Left    Deltoid   5    Deltoid   5  Supraspinatus  5    Supraspinatus  3  ER   5    ER   5  IR   5    IR   5    Special tests    Right   Rotator Cuff    Left    n   Painful arc    n   n   Pain with ER    n    n   Neer's     n    n   Hawkin's    n    n   Drop Arm    n  n   Lift off/Belly Press   n  n   ER Lag    n      she can actively flex and extend the wrist and flex, extend, abduct, and adduct all of her fingers. Imaging Studies: 4 x-ray views of the right shoulder completed on 3/8/2023 were independently reviewed demonstrating a reverse shoulder implant to be in acceptable alignment and well fixed without any obvious fracture, dislocation, or subluxation.     Impression and plan: Caryle Muster is approximately 1 year status post a right reverse shoulder arthroplasty. she is doing very well and is overall very happy with the results of her surgery. At this time she was encouraged to keep up with her home exercise program and activity as she feels comfortable. I will see her back in my clinic in a year but she may return or call earlier with questions or concerns.

## 2023-05-03 ENCOUNTER — HOSPITAL ENCOUNTER (OUTPATIENT)
Age: 68
Setting detail: SPECIMEN
Discharge: HOME | End: 2023-05-03

## 2023-05-03 DIAGNOSIS — E66.01 CLASS 3 SEVERE OBESITY DUE TO EXCESS CALORIES WITHOUT SERIOUS COMORBIDITY WITH BODY MASS INDEX (BMI) OF 40.0 TO 44.9 IN ADULT (HCC): ICD-10-CM

## 2023-05-03 DIAGNOSIS — I10 ESSENTIAL HYPERTENSION: ICD-10-CM

## 2023-05-03 LAB
ABSOLUTE EOS #: 0.09 K/UL (ref 0–0.44)
ABSOLUTE IMMATURE GRANULOCYTE: 0.03 K/UL (ref 0–0.3)
ABSOLUTE LYMPH #: 1.59 K/UL (ref 1.1–3.7)
ABSOLUTE MONO #: 0.69 K/UL (ref 0.1–1.2)
BASOPHILS # BLD: 0 % (ref 0–2)
BASOPHILS ABSOLUTE: 0.03 K/UL (ref 0–0.2)
EOSINOPHILS RELATIVE PERCENT: 1 % (ref 1–4)
HCT VFR BLD AUTO: 46.3 % (ref 36.3–47.1)
HGB BLD-MCNC: 15.3 G/DL (ref 11.9–15.1)
IMMATURE GRANULOCYTES: 0 %
LYMPHOCYTES # BLD: 21 % (ref 24–43)
MCH RBC QN AUTO: 30.5 PG (ref 25.2–33.5)
MCHC RBC AUTO-ENTMCNC: 33 G/DL (ref 28.4–34.8)
MCV RBC AUTO: 92.2 FL (ref 82.6–102.9)
MONOCYTES # BLD: 9 % (ref 3–12)
NRBC AUTOMATED: 0 PER 100 WBC
PDW BLD-RTO: 13.1 % (ref 11.8–14.4)
PLATELET # BLD AUTO: 336 K/UL (ref 138–453)
PMV BLD AUTO: 12.4 FL (ref 8.1–13.5)
RBC # BLD: 5.02 M/UL (ref 3.95–5.11)
SEG NEUTROPHILS: 69 % (ref 36–65)
SEGMENTED NEUTROPHILS ABSOLUTE COUNT: 5.18 K/UL (ref 1.5–8.1)
WBC # BLD AUTO: 7.6 K/UL (ref 3.5–11.3)

## 2023-05-04 LAB
ALBUMIN SERPL-MCNC: 4.1 G/DL (ref 3.5–5.2)
ALBUMIN/GLOBULIN RATIO: 1.2 (ref 1–2.5)
ALP SERPL-CCNC: 97 U/L (ref 35–104)
ALT SERPL-CCNC: 23 U/L (ref 5–33)
ANION GAP SERPL CALCULATED.3IONS-SCNC: 20 MMOL/L (ref 9–17)
AST SERPL-CCNC: 32 U/L
BILIRUB SERPL-MCNC: 0.6 MG/DL (ref 0.3–1.2)
BUN SERPL-MCNC: 24 MG/DL (ref 8–23)
CALCIUM SERPL-MCNC: 10 MG/DL (ref 8.6–10.4)
CHLORIDE SERPL-SCNC: 103 MMOL/L (ref 98–107)
CHOLEST SERPL-MCNC: 260 MG/DL
CHOLESTEROL/HDL RATIO: 4.8
CO2 SERPL-SCNC: 20 MMOL/L (ref 20–31)
CREAT SERPL-MCNC: 0.58 MG/DL (ref 0.5–0.9)
GFR SERPL CREATININE-BSD FRML MDRD: >60 ML/MIN/1.73M2
GLUCOSE SERPL-MCNC: 99 MG/DL (ref 70–99)
HDLC SERPL-MCNC: 54 MG/DL
LDLC SERPL CALC-MCNC: 177 MG/DL (ref 0–130)
POTASSIUM SERPL-SCNC: 3.9 MMOL/L (ref 3.7–5.3)
PROT SERPL-MCNC: 7.5 G/DL (ref 6.4–8.3)
PTH-INTACT SERPL-MCNC: 44.9 PG/ML (ref 14–72)
SODIUM SERPL-SCNC: 143 MMOL/L (ref 135–144)
TRIGL SERPL-MCNC: 143 MG/DL
TSH SERPL-ACNC: 1.12 UIU/ML (ref 0.3–5)

## 2023-05-16 SDOH — ECONOMIC STABILITY: INCOME INSECURITY: HOW HARD IS IT FOR YOU TO PAY FOR THE VERY BASICS LIKE FOOD, HOUSING, MEDICAL CARE, AND HEATING?: NOT VERY HARD

## 2023-05-16 SDOH — ECONOMIC STABILITY: FOOD INSECURITY: WITHIN THE PAST 12 MONTHS, YOU WORRIED THAT YOUR FOOD WOULD RUN OUT BEFORE YOU GOT MONEY TO BUY MORE.: NEVER TRUE

## 2023-05-16 SDOH — ECONOMIC STABILITY: TRANSPORTATION INSECURITY
IN THE PAST 12 MONTHS, HAS LACK OF TRANSPORTATION KEPT YOU FROM MEETINGS, WORK, OR FROM GETTING THINGS NEEDED FOR DAILY LIVING?: NO

## 2023-05-16 SDOH — ECONOMIC STABILITY: FOOD INSECURITY: WITHIN THE PAST 12 MONTHS, THE FOOD YOU BOUGHT JUST DIDN'T LAST AND YOU DIDN'T HAVE MONEY TO GET MORE.: NEVER TRUE

## 2023-05-16 SDOH — ECONOMIC STABILITY: HOUSING INSECURITY
IN THE LAST 12 MONTHS, WAS THERE A TIME WHEN YOU DID NOT HAVE A STEADY PLACE TO SLEEP OR SLEPT IN A SHELTER (INCLUDING NOW)?: NO

## 2023-05-17 ENCOUNTER — OFFICE VISIT (OUTPATIENT)
Dept: FAMILY MEDICINE CLINIC | Age: 68
End: 2023-05-17
Payer: MEDICARE

## 2023-05-17 VITALS
OXYGEN SATURATION: 97 % | DIASTOLIC BLOOD PRESSURE: 82 MMHG | RESPIRATION RATE: 18 BRPM | SYSTOLIC BLOOD PRESSURE: 130 MMHG | TEMPERATURE: 97.3 F | HEART RATE: 78 BPM

## 2023-05-17 DIAGNOSIS — E87.6 HYPOKALEMIA: ICD-10-CM

## 2023-05-17 DIAGNOSIS — M48.061 FORAMINAL STENOSIS OF LUMBAR REGION: ICD-10-CM

## 2023-05-17 DIAGNOSIS — I10 ESSENTIAL HYPERTENSION: Primary | ICD-10-CM

## 2023-05-17 DIAGNOSIS — M51.36 DEGENERATION OF LUMBAR INTERVERTEBRAL DISC: ICD-10-CM

## 2023-05-17 PROCEDURE — 1123F ACP DISCUSS/DSCN MKR DOCD: CPT | Performed by: NURSE PRACTITIONER

## 2023-05-17 PROCEDURE — 3017F COLORECTAL CA SCREEN DOC REV: CPT | Performed by: NURSE PRACTITIONER

## 2023-05-17 PROCEDURE — G8400 PT W/DXA NO RESULTS DOC: HCPCS | Performed by: NURSE PRACTITIONER

## 2023-05-17 PROCEDURE — 99215 OFFICE O/P EST HI 40 MIN: CPT | Performed by: NURSE PRACTITIONER

## 2023-05-17 PROCEDURE — 1090F PRES/ABSN URINE INCON ASSESS: CPT | Performed by: NURSE PRACTITIONER

## 2023-05-17 PROCEDURE — G8427 DOCREV CUR MEDS BY ELIG CLIN: HCPCS | Performed by: NURSE PRACTITIONER

## 2023-05-17 PROCEDURE — 3078F DIAST BP <80 MM HG: CPT | Performed by: NURSE PRACTITIONER

## 2023-05-17 PROCEDURE — 1036F TOBACCO NON-USER: CPT | Performed by: NURSE PRACTITIONER

## 2023-05-17 PROCEDURE — G8417 CALC BMI ABV UP PARAM F/U: HCPCS | Performed by: NURSE PRACTITIONER

## 2023-05-17 PROCEDURE — 3074F SYST BP LT 130 MM HG: CPT | Performed by: NURSE PRACTITIONER

## 2023-05-17 RX ORDER — POTASSIUM CHLORIDE 750 MG/1
10 TABLET, EXTENDED RELEASE ORAL DAILY
Qty: 90 TABLET | Refills: 1 | Status: SHIPPED | OUTPATIENT
Start: 2023-05-17 | End: 2024-05-16

## 2023-05-17 ASSESSMENT — ENCOUNTER SYMPTOMS
DIARRHEA: 0
NAUSEA: 0
BLOOD IN STOOL: 0
BACK PAIN: 1
ABDOMINAL DISTENTION: 0
CHEST TIGHTNESS: 0
ABDOMINAL PAIN: 0
SHORTNESS OF BREATH: 1
COUGH: 0
CONSTIPATION: 0

## 2023-05-17 ASSESSMENT — PATIENT HEALTH QUESTIONNAIRE - PHQ9
SUM OF ALL RESPONSES TO PHQ QUESTIONS 1-9: 0
SUM OF ALL RESPONSES TO PHQ9 QUESTIONS 1 & 2: 0
SUM OF ALL RESPONSES TO PHQ QUESTIONS 1-9: 0
2. FEELING DOWN, DEPRESSED OR HOPELESS: 0
1. LITTLE INTEREST OR PLEASURE IN DOING THINGS: 0

## 2023-05-17 ASSESSMENT — ANXIETY QUESTIONNAIRES
6. BECOMING EASILY ANNOYED OR IRRITABLE: 0
2. NOT BEING ABLE TO STOP OR CONTROL WORRYING: 0
IF YOU CHECKED OFF ANY PROBLEMS ON THIS QUESTIONNAIRE, HOW DIFFICULT HAVE THESE PROBLEMS MADE IT FOR YOU TO DO YOUR WORK, TAKE CARE OF THINGS AT HOME, OR GET ALONG WITH OTHER PEOPLE: NOT DIFFICULT AT ALL
1. FEELING NERVOUS, ANXIOUS, OR ON EDGE: 0
3. WORRYING TOO MUCH ABOUT DIFFERENT THINGS: 0
7. FEELING AFRAID AS IF SOMETHING AWFUL MIGHT HAPPEN: 0
GAD7 TOTAL SCORE: 0
5. BEING SO RESTLESS THAT IT IS HARD TO SIT STILL: 0
4. TROUBLE RELAXING: 0

## 2023-05-17 NOTE — PROGRESS NOTES
[address]  [phone]    5/17/23     Patient ID  Rosa Franks is a 75 y.o. female  Established patient    Chief Complaint  Rosa Franks presents today for Hypertension, Back Pain, and Foot Pain (LT heel. Onset 1-2 days ago. Heel, bottom of foot. )    Have you seen any other physician or provider since your last visit? Yes - Records Obtained Orthopedics- 3/8/23  Have you had any other diagnostic tests since your last visit? Yes - Records Obtained XR Rt Shoulder- Post  Have you been seen in the emergency room and/or had an admission to a hospital since we last saw you? No     ASSESSMENT/PLAN  1. Essential hypertension  2. Hypokalemia  -     potassium chloride (KLOR-CON M) 10 MEQ extended release tablet; Take 1 tablet by mouth daily, Disp-90 tablet, R-1Normal  -     Basic Metabolic Panel; Future  -     Magnesium; Future  3. Degeneration of lumbar intervertebral disc  4. Foraminal stenosis of lumbar region     Patient is to stop OTC potassium ans take proscription  Labs next OV   Encouraged to have MRI and discuss potential temporary stimulator placement       Return in about 4 months (around 9/17/2023). On this date 5/17/2023 I have spent 44+ minutes reviewing previous notes, test results and face to face with the patient discussing the diagnosis and importance of compliance with the treatment plan as well as documenting on the day of the visit. Patient Care Team:  RENAN Corona CNP as PCP - General (Nurse Practitioner Family)  RENAN Corona CNP as PCP - Empaneled Provider  Candance Sear, MD as Consulting Physician (Orthopedic Surgery)    SUBJECTIVE/OBJECTIVE  History of Present illness / Visit Summary   Patient presents today for follow up   Reviewed health maintenance and any recent labs/imaging      We did spend time reviewing documentation from pain management.   We also discussed patient's past histories and concerns

## 2023-07-09 DIAGNOSIS — I10 ESSENTIAL HYPERTENSION: Chronic | ICD-10-CM

## 2023-07-09 DIAGNOSIS — M54.42 ACUTE BILATERAL LOW BACK PAIN WITH BILATERAL SCIATICA: ICD-10-CM

## 2023-07-09 DIAGNOSIS — M54.41 ACUTE BILATERAL LOW BACK PAIN WITH BILATERAL SCIATICA: ICD-10-CM

## 2023-07-10 NOTE — TELEPHONE ENCOUNTER
LOV 5/17/23   RTO 7/21/23  LRF 1/1623 cymbalta  LRF11/9/22 ALDACTAZIDE          Controlled Substance Monitoring:    Acute and Chronic Pain Monitoring:   RX Monitoring 11/29/2022   Periodic Controlled Substance Monitoring Obtaining appropriate analgesic effect of treatment. Chronic Pain > 50 MEDD Re-evaluated the status of the patient's underlying condition causing pain.

## 2023-07-11 RX ORDER — DULOXETIN HYDROCHLORIDE 60 MG/1
CAPSULE, DELAYED RELEASE ORAL
Qty: 90 CAPSULE | Refills: 1 | Status: SHIPPED | OUTPATIENT
Start: 2023-07-11

## 2023-07-11 RX ORDER — SPIRONOLACTONE AND HYDROCHLOROTHIAZIDE 25; 25 MG/1; MG/1
TABLET ORAL
Qty: 90 TABLET | Refills: 1 | Status: SHIPPED | OUTPATIENT
Start: 2023-07-11

## 2023-07-21 ENCOUNTER — HOSPITAL ENCOUNTER (OUTPATIENT)
Dept: MRI IMAGING | Facility: CLINIC | Age: 68
End: 2023-07-21
Payer: MEDICARE

## 2023-07-21 DIAGNOSIS — M54.17 LUMBOSACRAL NEURITIS: ICD-10-CM

## 2023-07-21 PROCEDURE — 72148 MRI LUMBAR SPINE W/O DYE: CPT

## 2023-08-03 ENCOUNTER — PATIENT MESSAGE (OUTPATIENT)
Dept: FAMILY MEDICINE CLINIC | Age: 68
End: 2023-08-03

## 2023-08-03 NOTE — TELEPHONE ENCOUNTER
From: Maritza Santiago  To: Bridget Gil  Sent: 8/3/2023 11:26 AM EDT  Subject: Echocardiogram     Would your office please send a fax order for the Echocardiogram, you want me to have, to [facility] physicians cardiology, [phone].

## 2023-08-11 DIAGNOSIS — M54.41 ACUTE BILATERAL LOW BACK PAIN WITH BILATERAL SCIATICA: ICD-10-CM

## 2023-08-11 DIAGNOSIS — M54.42 ACUTE BILATERAL LOW BACK PAIN WITH BILATERAL SCIATICA: ICD-10-CM

## 2023-08-14 RX ORDER — TIZANIDINE 4 MG/1
TABLET ORAL
Qty: 90 TABLET | Refills: 0 | Status: SHIPPED | OUTPATIENT
Start: 2023-08-14

## 2023-08-14 NOTE — TELEPHONE ENCOUNTER
LOV 5/17/2023     RTO 9/20/2023  LRF 4/14/2023          Controlled Substance Monitoring:    Acute and Chronic Pain Monitoring:   RX Monitoring 11/29/2022   Periodic Controlled Substance Monitoring Obtaining appropriate analgesic effect of treatment. Chronic Pain > 50 MEDD Re-evaluated the status of the patient's underlying condition causing pain.

## 2023-08-23 ENCOUNTER — HOSPITAL ENCOUNTER (OUTPATIENT)
Age: 68
Setting detail: SPECIMEN
Discharge: HOME | End: 2023-08-23

## 2023-08-23 DIAGNOSIS — E87.6 HYPOKALEMIA: ICD-10-CM

## 2023-08-23 LAB
ANION GAP SERPL CALCULATED.3IONS-SCNC: 13 MMOL/L (ref 9–17)
BUN SERPL-MCNC: 18 MG/DL (ref 8–23)
CALCIUM SERPL-MCNC: 9.5 MG/DL (ref 8.6–10.4)
CHLORIDE SERPL-SCNC: 98 MMOL/L (ref 98–107)
CO2 SERPL-SCNC: 26 MMOL/L (ref 20–31)
CREAT SERPL-MCNC: 0.7 MG/DL (ref 0.5–0.9)
GFR SERPL CREATININE-BSD FRML MDRD: >60 ML/MIN/1.73M2
GLUCOSE SERPL-MCNC: 93 MG/DL (ref 70–99)
MAGNESIUM SERPL-MCNC: 1.9 MG/DL (ref 1.6–2.6)
POTASSIUM SERPL-SCNC: 4.3 MMOL/L (ref 3.7–5.3)
SODIUM SERPL-SCNC: 137 MMOL/L (ref 135–144)

## 2023-09-28 ENCOUNTER — OFFICE VISIT (OUTPATIENT)
Dept: FAMILY MEDICINE CLINIC | Age: 68
End: 2023-09-28
Payer: MEDICARE

## 2023-09-28 VITALS
DIASTOLIC BLOOD PRESSURE: 84 MMHG | RESPIRATION RATE: 16 BRPM | OXYGEN SATURATION: 98 % | SYSTOLIC BLOOD PRESSURE: 126 MMHG | TEMPERATURE: 97.4 F | HEART RATE: 86 BPM

## 2023-09-28 DIAGNOSIS — I34.1 MVP (MITRAL VALVE PROLAPSE): ICD-10-CM

## 2023-09-28 DIAGNOSIS — I10 ESSENTIAL HYPERTENSION: ICD-10-CM

## 2023-09-28 DIAGNOSIS — M51.36 DEGENERATION OF LUMBAR INTERVERTEBRAL DISC: Primary | ICD-10-CM

## 2023-09-28 DIAGNOSIS — M48.061 FORAMINAL STENOSIS OF LUMBAR REGION: ICD-10-CM

## 2023-09-28 PROCEDURE — G8400 PT W/DXA NO RESULTS DOC: HCPCS | Performed by: NURSE PRACTITIONER

## 2023-09-28 PROCEDURE — 3074F SYST BP LT 130 MM HG: CPT | Performed by: NURSE PRACTITIONER

## 2023-09-28 PROCEDURE — 99214 OFFICE O/P EST MOD 30 MIN: CPT | Performed by: NURSE PRACTITIONER

## 2023-09-28 PROCEDURE — 1090F PRES/ABSN URINE INCON ASSESS: CPT | Performed by: NURSE PRACTITIONER

## 2023-09-28 PROCEDURE — G8427 DOCREV CUR MEDS BY ELIG CLIN: HCPCS | Performed by: NURSE PRACTITIONER

## 2023-09-28 PROCEDURE — 1036F TOBACCO NON-USER: CPT | Performed by: NURSE PRACTITIONER

## 2023-09-28 PROCEDURE — G8417 CALC BMI ABV UP PARAM F/U: HCPCS | Performed by: NURSE PRACTITIONER

## 2023-09-28 PROCEDURE — 1123F ACP DISCUSS/DSCN MKR DOCD: CPT | Performed by: NURSE PRACTITIONER

## 2023-09-28 PROCEDURE — 3017F COLORECTAL CA SCREEN DOC REV: CPT | Performed by: NURSE PRACTITIONER

## 2023-09-28 PROCEDURE — 3078F DIAST BP <80 MM HG: CPT | Performed by: NURSE PRACTITIONER

## 2023-09-28 ASSESSMENT — ENCOUNTER SYMPTOMS
NAUSEA: 0
BACK PAIN: 1
COUGH: 0
CHEST TIGHTNESS: 0
ABDOMINAL DISTENTION: 0
BLOOD IN STOOL: 0
SHORTNESS OF BREATH: 1
DIARRHEA: 0
CONSTIPATION: 0
ABDOMINAL PAIN: 0

## 2023-09-28 NOTE — PROGRESS NOTES
[address]   [address]  [phone]    9/28/23     Patient ID  Benitez Bowers is a 70 y.o. female  Established patient    Chief Complaint  Benitez Bowers presents today for Hypertension, Back Pain, and Medication Check    Have you seen any other physician or provider since your last visit? Yes - Records Requested Pain management  Have you had any other diagnostic tests since your last visit? Yes - Records Requested MRI  Have you been seen in the emergency room and/or had an admission to a hospital since we last saw you? No     ASSESSMENT/PLAN  1. Degeneration of lumbar intervertebral disc  2. Foraminal stenosis of lumbar region  3. Essential hypertension  -     Echo (TTE) Complete; Future  4. MVP (mitral valve prolapse)  -     Echo (TTE) Complete; Future  -     EKG 12 lead; Future     Cardiac testing - if abnormal will refer to cardiology       Return in about 3 months (around 12/28/2023). Patient Care Team:  RENAN Lara CNP as PCP - General (Nurse Practitioner Family)  RENAN Lara CNP as PCP - Empaneled Provider  Karen Martinez MD as Consulting Physician (Orthopedic Surgery)    SUBJECTIVE/OBJECTIVE  History of Present illness / Visit Summary   Patient presents today for follow up   Reviewed health maintenance and any recent labs/imaging    Caring for daughter - she had recent back surgery   Lack of rest, not in own bed          Review of Systems  Review of Systems   Constitutional:  Positive for fatigue. Negative for activity change, appetite change, chills and fever. HENT:  Negative for congestion and dental problem. Current dental exams    Eyes:         Current eye exams    Respiratory:  Positive for shortness of breath. Negative for cough and chest tightness. Cardiovascular:  Positive for palpitations and leg swelling. Negative for chest pain.    Gastrointestinal:  Negative for abdominal distention, abdominal

## 2023-10-14 DIAGNOSIS — M48.061 NEURAL FORAMINAL STENOSIS OF LUMBAR SPINE: ICD-10-CM

## 2023-10-16 RX ORDER — CELECOXIB 100 MG/1
100 CAPSULE ORAL 2 TIMES DAILY
Qty: 180 CAPSULE | Refills: 3 | Status: SHIPPED | OUTPATIENT
Start: 2023-10-16

## 2023-10-16 NOTE — TELEPHONE ENCOUNTER
LOV 9/28/2023   RTO 1/17/2024  LRF 11/10/2022          Controlled Substance Monitoring:    Acute and Chronic Pain Monitoring:   RX Monitoring Periodic Controlled Substance Monitoring Chronic Pain > 50 MEDD   11/29/2022   7:24 AM Obtaining appropriate analgesic effect of treatment. Re-evaluated the status of the patient's underlying condition causing pain.

## 2023-10-20 DIAGNOSIS — E87.6 HYPOKALEMIA: ICD-10-CM

## 2023-10-20 NOTE — TELEPHONE ENCOUNTER
LOV 09/28/23   RTO 01/17/2024  LRF 05/17/2023          Controlled Substance Monitoring:    Acute and Chronic Pain Monitoring:   RX Monitoring Periodic Controlled Substance Monitoring Chronic Pain > 50 MEDD   11/29/2022   7:24 AM Obtaining appropriate analgesic effect of treatment. Re-evaluated the status of the patient's underlying condition causing pain.

## 2023-10-23 RX ORDER — POTASSIUM CHLORIDE 750 MG/1
10 TABLET, EXTENDED RELEASE ORAL DAILY
Qty: 90 TABLET | Refills: 1 | Status: SHIPPED | OUTPATIENT
Start: 2023-10-23

## 2023-12-11 RX ORDER — DICYCLOMINE HYDROCHLORIDE 10 MG/1
10 CAPSULE ORAL DAILY
Qty: 30 CAPSULE | Refills: 5 | Status: SHIPPED | OUTPATIENT
Start: 2023-12-11

## 2023-12-11 NOTE — TELEPHONE ENCOUNTER
lumbar region     Osteoarthritis of right glenohumeral joint     Morbidly obese (HCC)     Degeneration of lumbar intervertebral disc

## 2023-12-31 DIAGNOSIS — M54.42 ACUTE BILATERAL LOW BACK PAIN WITH BILATERAL SCIATICA: ICD-10-CM

## 2023-12-31 DIAGNOSIS — M54.41 ACUTE BILATERAL LOW BACK PAIN WITH BILATERAL SCIATICA: ICD-10-CM

## 2024-01-02 DIAGNOSIS — M54.41 ACUTE BILATERAL LOW BACK PAIN WITH BILATERAL SCIATICA: ICD-10-CM

## 2024-01-02 DIAGNOSIS — M54.42 ACUTE BILATERAL LOW BACK PAIN WITH BILATERAL SCIATICA: ICD-10-CM

## 2024-01-02 RX ORDER — DULOXETIN HYDROCHLORIDE 60 MG/1
CAPSULE, DELAYED RELEASE ORAL
Qty: 90 CAPSULE | Refills: 1 | Status: SHIPPED | OUTPATIENT
Start: 2024-01-02 | End: 2025-01-02

## 2024-01-02 NOTE — TELEPHONE ENCOUNTER
LOV 9/28/23  RTO 3 months; F/U scheduled  LRF 7/11/23    Health Maintenance   Topic Date Due    Colorectal Cancer Screen  Never done    Shingles vaccine (1 of 2) Never done    DEXA (modify frequency per FRAX score)  Never done    Respiratory Syncytial Virus (RSV) Pregnant or age 60 yrs+ (1 - 1-dose 60+ series) Never done    Breast cancer screen  06/12/2015    Pneumococcal 65+ years Vaccine (1 - PCV) Never done    Flu vaccine (1) 08/01/2023    COVID-19 Vaccine (4 - 2023-24 season) 09/01/2023    Annual Wellness Visit (Medicare)  11/27/2023    Depression Screen  05/17/2024    Lipids  05/03/2028    DTaP/Tdap/Td vaccine (2 - Td or Tdap) 11/12/2029    Hepatitis A vaccine  Aged Out    Hepatitis B vaccine  Aged Out    Hib vaccine  Aged Out    Polio vaccine  Aged Out    Meningococcal (ACWY) vaccine  Aged Out    Diabetes screen  Discontinued    Hepatitis C screen  Discontinued             (applicable per patient's age: Cancer Screenings, Depression Screening, Fall Risk Screening, Immunizations)    Hemoglobin A1C (%)   Date Value   08/22/2018 5.4     LDL Cholesterol (mg/dL)   Date Value   05/03/2023 177 (H)     LDL Calculated (mg/dL)   Date Value   04/03/2019 212 (A)     AST (U/L)   Date Value   05/03/2023 32 (H)     ALT (U/L)   Date Value   05/03/2023 23     BUN (mg/dL)   Date Value   08/23/2023 18      (goal A1C is < 7)   (goal LDL is <100) need 30-50% reduction from baseline     BP Readings from Last 3 Encounters:   09/28/23 126/84   05/17/23 130/82   11/15/22 (!) 144/88    (goal /80)      All Future Testing planned in [facility]:  Lab Frequency Next Occurrence       Next Visit Date:  Future Appointments   Date Time Provider Department Center   1/17/2024 10:30 AM Amy Torrez, APRN - CNP [facility]            Patient Active Problem List:     Essential hypertension     Medication therapy management recommendation refused by patient     Mixed hyperlipidemia     Spinal stenosis of lumbar region without

## 2024-01-03 DIAGNOSIS — I10 ESSENTIAL HYPERTENSION: Chronic | ICD-10-CM

## 2024-01-03 NOTE — TELEPHONE ENCOUNTER
LOV 9/28/23  RTO 3 months; F/U scheduled  LRF 8/14/23    Health Maintenance   Topic Date Due    Colorectal Cancer Screen  Never done    Shingles vaccine (1 of 2) Never done    DEXA (modify frequency per FRAX score)  Never done    Respiratory Syncytial Virus (RSV) Pregnant or age 60 yrs+ (1 - 1-dose 60+ series) Never done    Breast cancer screen  06/12/2015    Pneumococcal 65+ years Vaccine (1 - PCV) Never done    Flu vaccine (1) 08/01/2023    COVID-19 Vaccine (4 - 2023-24 season) 09/01/2023    Annual Wellness Visit (Medicare)  11/27/2023    Depression Screen  05/17/2024    Lipids  05/03/2028    DTaP/Tdap/Td vaccine (2 - Td or Tdap) 11/12/2029    Hepatitis A vaccine  Aged Out    Hepatitis B vaccine  Aged Out    Hib vaccine  Aged Out    Polio vaccine  Aged Out    Meningococcal (ACWY) vaccine  Aged Out    Diabetes screen  Discontinued    Hepatitis C screen  Discontinued             (applicable per patient's age: Cancer Screenings, Depression Screening, Fall Risk Screening, Immunizations)    Hemoglobin A1C (%)   Date Value   08/22/2018 5.4     LDL Cholesterol (mg/dL)   Date Value   05/03/2023 177 (H)     LDL Calculated (mg/dL)   Date Value   04/03/2019 212 (A)     AST (U/L)   Date Value   05/03/2023 32 (H)     ALT (U/L)   Date Value   05/03/2023 23     BUN (mg/dL)   Date Value   08/23/2023 18      (goal A1C is < 7)   (goal LDL is <100) need 30-50% reduction from baseline     BP Readings from Last 3 Encounters:   09/28/23 126/84   05/17/23 130/82   11/15/22 (!) 144/88    (goal /80)      All Future Testing planned in [facility]:  Lab Frequency Next Occurrence       Next Visit Date:  Future Appointments   Date Time Provider Department Center   1/17/2024 10:30 AM Amy Torrez, APRN - CNP [place] [facility]            Patient Active Problem List:     Essential hypertension     Medication therapy management recommendation refused by patient     Mixed hyperlipidemia     Spinal stenosis of lumbar region without

## 2024-01-03 NOTE — TELEPHONE ENCOUNTER
LOV 9/28/23  RTO F/U scheduled  LRF 7/11/23    Health Maintenance   Topic Date Due    Colorectal Cancer Screen  Never done    Shingles vaccine (1 of 2) Never done    DEXA (modify frequency per FRAX score)  Never done    Respiratory Syncytial Virus (RSV) Pregnant or age 60 yrs+ (1 - 1-dose 60+ series) Never done    Breast cancer screen  06/12/2015    Pneumococcal 65+ years Vaccine (1 - PCV) Never done    Flu vaccine (1) 08/01/2023    COVID-19 Vaccine (4 - 2023-24 season) 09/01/2023    Annual Wellness Visit (Medicare)  11/27/2023    Depression Screen  05/17/2024    Lipids  05/03/2028    DTaP/Tdap/Td vaccine (2 - Td or Tdap) 11/12/2029    Hepatitis A vaccine  Aged Out    Hepatitis B vaccine  Aged Out    Hib vaccine  Aged Out    Polio vaccine  Aged Out    Meningococcal (ACWY) vaccine  Aged Out    Diabetes screen  Discontinued    Hepatitis C screen  Discontinued             (applicable per patient's age: Cancer Screenings, Depression Screening, Fall Risk Screening, Immunizations)    Hemoglobin A1C (%)   Date Value   08/22/2018 5.4     LDL Cholesterol (mg/dL)   Date Value   05/03/2023 177 (H)     LDL Calculated (mg/dL)   Date Value   04/03/2019 212 (A)     AST (U/L)   Date Value   05/03/2023 32 (H)     ALT (U/L)   Date Value   05/03/2023 23     BUN (mg/dL)   Date Value   08/23/2023 18      (goal A1C is < 7)   (goal LDL is <100) need 30-50% reduction from baseline     BP Readings from Last 3 Encounters:   09/28/23 126/84   05/17/23 130/82   11/15/22 (!) 144/88    (goal /80)      All Future Testing planned in [facility]:  Lab Frequency Next Occurrence       Next Visit Date:  Future Appointments   Date Time Provider Department Center   1/17/2024 10:30 AM Amy Torrez, APRN - CNP [facility]            Patient Active Problem List:     Essential hypertension     Medication therapy management recommendation refused by patient     Mixed hyperlipidemia     Spinal stenosis of lumbar region without neurogenic

## 2024-01-05 RX ORDER — SPIRONOLACTONE AND HYDROCHLOROTHIAZIDE 25; 25 MG/1; MG/1
TABLET ORAL
Qty: 90 TABLET | Refills: 1 | Status: SHIPPED | OUTPATIENT
Start: 2024-01-05 | End: 2025-01-05

## 2024-01-05 RX ORDER — TIZANIDINE 4 MG/1
TABLET ORAL
Qty: 90 TABLET | Refills: 1 | Status: SHIPPED | OUTPATIENT
Start: 2024-01-05 | End: 2025-01-03

## 2024-01-17 ENCOUNTER — OFFICE VISIT (OUTPATIENT)
Dept: FAMILY MEDICINE CLINIC | Age: 69
End: 2024-01-17
Payer: MEDICARE

## 2024-01-17 ENCOUNTER — OFFICE VISIT (OUTPATIENT)
Dept: FAMILY MEDICINE CLINIC | Age: 69
End: 2024-01-17

## 2024-01-17 VITALS
DIASTOLIC BLOOD PRESSURE: 86 MMHG | TEMPERATURE: 96.9 F | WEIGHT: 269.4 LBS | SYSTOLIC BLOOD PRESSURE: 120 MMHG | RESPIRATION RATE: 18 BRPM | HEIGHT: 67 IN | BODY MASS INDEX: 42.28 KG/M2

## 2024-01-17 DIAGNOSIS — G25.81 RLS (RESTLESS LEGS SYNDROME): ICD-10-CM

## 2024-01-17 DIAGNOSIS — Z53.20 BREAST SCREENING DECLINED: ICD-10-CM

## 2024-01-17 DIAGNOSIS — M25.50 ARTHRALGIA, UNSPECIFIED JOINT: ICD-10-CM

## 2024-01-17 DIAGNOSIS — R79.82 ELEVATED C-REACTIVE PROTEIN (CRP): ICD-10-CM

## 2024-01-17 DIAGNOSIS — Z00.00 ANNUAL PHYSICAL EXAM: Primary | ICD-10-CM

## 2024-01-17 DIAGNOSIS — E66.01 OBESITY, CLASS III, BMI 40-49.9 (MORBID OBESITY) (HCC): ICD-10-CM

## 2024-01-17 DIAGNOSIS — Z53.20 COLON CANCER SCREENING DECLINED: ICD-10-CM

## 2024-01-17 DIAGNOSIS — R71.8 OTHER ABNORMALITY OF RED BLOOD CELLS: ICD-10-CM

## 2024-01-17 DIAGNOSIS — M45.6 ANKYLOSING SPONDYLITIS LUMBAR REGION (HCC): ICD-10-CM

## 2024-01-17 DIAGNOSIS — Z00.00 MEDICARE ANNUAL WELLNESS VISIT, SUBSEQUENT: Primary | ICD-10-CM

## 2024-01-17 DIAGNOSIS — R10.84 GENERALIZED ABDOMINAL PAIN: ICD-10-CM

## 2024-01-17 DIAGNOSIS — Z13.31 SCREENING FOR DEPRESSION: ICD-10-CM

## 2024-01-17 DIAGNOSIS — D58.2 ELEVATED HEMOGLOBIN (HCC): ICD-10-CM

## 2024-01-17 DIAGNOSIS — E87.6 HYPOKALEMIA: ICD-10-CM

## 2024-01-17 DIAGNOSIS — I10 ESSENTIAL HYPERTENSION: ICD-10-CM

## 2024-01-17 DIAGNOSIS — R63.5 WEIGHT GAIN, ABNORMAL: ICD-10-CM

## 2024-01-17 DIAGNOSIS — M48.061 FORAMINAL STENOSIS OF LUMBAR REGION: ICD-10-CM

## 2024-01-17 PROCEDURE — 1090F PRES/ABSN URINE INCON ASSESS: CPT | Performed by: NURSE PRACTITIONER

## 2024-01-17 PROCEDURE — G0439 PPPS, SUBSEQ VISIT: HCPCS | Performed by: NURSE PRACTITIONER

## 2024-01-17 PROCEDURE — G8400 PT W/DXA NO RESULTS DOC: HCPCS | Performed by: NURSE PRACTITIONER

## 2024-01-17 PROCEDURE — 1036F TOBACCO NON-USER: CPT | Performed by: NURSE PRACTITIONER

## 2024-01-17 PROCEDURE — G8427 DOCREV CUR MEDS BY ELIG CLIN: HCPCS | Performed by: NURSE PRACTITIONER

## 2024-01-17 PROCEDURE — 3017F COLORECTAL CA SCREEN DOC REV: CPT | Performed by: NURSE PRACTITIONER

## 2024-01-17 PROCEDURE — G8417 CALC BMI ABV UP PARAM F/U: HCPCS | Performed by: NURSE PRACTITIONER

## 2024-01-17 PROCEDURE — 1123F ACP DISCUSS/DSCN MKR DOCD: CPT | Performed by: NURSE PRACTITIONER

## 2024-01-17 PROCEDURE — G8484 FLU IMMUNIZE NO ADMIN: HCPCS | Performed by: NURSE PRACTITIONER

## 2024-01-17 PROCEDURE — 99214 OFFICE O/P EST MOD 30 MIN: CPT | Performed by: NURSE PRACTITIONER

## 2024-01-17 PROCEDURE — 3074F SYST BP LT 130 MM HG: CPT | Performed by: NURSE PRACTITIONER

## 2024-01-17 PROCEDURE — 3079F DIAST BP 80-89 MM HG: CPT | Performed by: NURSE PRACTITIONER

## 2024-01-17 ASSESSMENT — PATIENT HEALTH QUESTIONNAIRE - PHQ9
SUM OF ALL RESPONSES TO PHQ9 QUESTIONS 1 & 2: 0
2. FEELING DOWN, DEPRESSED OR HOPELESS: 0
SUM OF ALL RESPONSES TO PHQ QUESTIONS 1-9: 0
1. LITTLE INTEREST OR PLEASURE IN DOING THINGS: 0
SUM OF ALL RESPONSES TO PHQ QUESTIONS 1-9: 0

## 2024-01-17 ASSESSMENT — LIFESTYLE VARIABLES
HOW MANY STANDARD DRINKS CONTAINING ALCOHOL DO YOU HAVE ON A TYPICAL DAY: PATIENT DOES NOT DRINK
HOW OFTEN DO YOU HAVE A DRINK CONTAINING ALCOHOL: NEVER

## 2024-01-17 NOTE — PROGRESS NOTES
Medicare Annual Wellness Visit    Annmarie Riggins is here for Medicare AWV, Hypertension, and Back Pain    Assessment & Plan   Medicare annual wellness visit, subsequent  Essential hypertension  -     CBC with Auto Differential; Future  Hypokalemia  -     Comprehensive Metabolic Panel; Future  -     Magnesium; Future  Foraminal stenosis of lumbar region  -     External Referral To Orthopaedic Surgery  Obesity, Class III, BMI 40-49.9 (morbid obesity) (HCC)  -     Iron and TIBC; Future  -     Ferritin; Future  RLS (restless legs syndrome)  -     Vitamin B12 & Folate; Future  Elevated C-reactive protein (CRP)  -     C-Reactive Protein; Future  Generalized abdominal pain  -     Vitamin B12 & Folate; Future  -     Celiac Disease Panel; Future  -     Iron and TIBC; Future  -     Ferritin; Future  Arthralgia, unspecified joint  -     HLA-B27 Antigen; Future  Weight gain, abnormal  -     Comprehensive Metabolic Panel; Future  -     Vitamin B12 & Folate; Future  -     CBC with Auto Differential; Future  -     Iron and TIBC; Future  -     Ferritin; Future  Elevated hemoglobin (HCC)  -     Vitamin B12 & Folate; Future  -     CBC with Auto Differential; Future  -     Iron and TIBC; Future  -     Ferritin; Future  Other abnormality of red blood cells  -     Iron and TIBC; Future  -     Ferritin; Future  Ankylosing spondylitis lumbar region (HCC)  -     HLA-B27 Antigen; Future  Colon cancer screening declined  Screening for depression  Breast screening declined    Recommendations for Preventive Services Due: see orders and patient instructions/AVS.  Recommended screening schedule for the next 5-10 years is provided to the patient in written form: see Patient Instructions/AVS.     Return in 6 months (on 7/17/2024).     Subjective   The following acute and/or chronic problems were also addressed today:  See additional OV note.     Patient's complete Health Risk Assessment and screening values have been reviewed and are found in

## 2024-01-17 NOTE — PROGRESS NOTES
[facility]   [address] [facility], [place], operated by [facility] [address]  [phone]    1/17/24     Patient ID  Annmarie Riggins is a 68 y.o. female  Established patient    Chief Complaint  Annmarie Riggins presents today for Medicare AWV, Hypertension, and Back Pain    Have you seen any other physician or provider since your last visit? Yes - Records Obtained Neurosurgery- Dr. Landeros  Have you had any other diagnostic tests since your last visit? Yes - Records Obtained ECHO  Have you been seen in the emergency room and/or had an admission to a hospital since we last saw you? No     ASSESSMENT/PLAN  1. Medicare annual wellness visit, subsequent  2. Essential hypertension  -     CBC with Auto Differential; Future  3. Hypokalemia  -     Comprehensive Metabolic Panel; Future  -     Magnesium; Future  4. Foraminal stenosis of lumbar region  -     External Referral To Orthopaedic Surgery  5. Obesity, Class III, BMI 40-49.9 (morbid obesity) (HCC)  -     Iron and TIBC; Future  -     Ferritin; Future  6. RLS (restless legs syndrome)  -     Vitamin B12 & Folate; Future  7. Elevated C-reactive protein (CRP)  -     C-Reactive Protein; Future  8. Generalized abdominal pain  -     Vitamin B12 & Folate; Future  -     Celiac Disease Panel; Future  -     Iron and TIBC; Future  -     Ferritin; Future  9. Arthralgia, unspecified joint  -     HLA-B27 Antigen; Future  10. Weight gain, abnormal  -     Comprehensive Metabolic Panel; Future  -     Vitamin B12 & Folate; Future  -     CBC with Auto Differential; Future  -     Iron and TIBC; Future  -     Ferritin; Future  11. Elevated hemoglobin (HCC)  -     Vitamin B12 & Folate; Future  -     CBC with Auto Differential; Future  -     Iron and TIBC; Future  -     Ferritin; Future  12. Other abnormality of red blood cells  -     Iron and TIBC; Future  -     Ferritin; Future  13. Ankylosing spondylitis lumbar region (HCC)  -     HLA-B27 Antigen; Future  14. Colon cancer screening

## 2024-01-23 PROBLEM — E66.813 OBESITY, CLASS III, BMI 40-49.9 (MORBID OBESITY): Status: ACTIVE | Noted: 2020-07-05

## 2024-01-23 PROBLEM — M45.6 ANKYLOSING SPONDYLITIS LUMBAR REGION (HCC): Status: ACTIVE | Noted: 2024-01-23

## 2024-01-23 PROBLEM — D58.2 ELEVATED HEMOGLOBIN (HCC): Status: ACTIVE | Noted: 2024-01-23

## 2024-01-30 ENCOUNTER — PATIENT MESSAGE (OUTPATIENT)
Dept: FAMILY MEDICINE CLINIC | Age: 69
End: 2024-01-30
Payer: COMMERCIAL

## 2024-01-30 DIAGNOSIS — M45.6 ANKYLOSING SPONDYLITIS LUMBAR REGION (HCC): Primary | ICD-10-CM

## 2024-01-30 PROCEDURE — 99441 PR PHYS/QHP TELEPHONE EVALUATION 5-10 MIN: CPT | Performed by: NURSE PRACTITIONER

## 2024-01-31 ENCOUNTER — HOSPITAL ENCOUNTER (OUTPATIENT)
Age: 69
Setting detail: SPECIMEN
Discharge: HOME | End: 2024-01-31

## 2024-01-31 DIAGNOSIS — R10.84 GENERALIZED ABDOMINAL PAIN: ICD-10-CM

## 2024-01-31 DIAGNOSIS — E66.01 OBESITY, CLASS III, BMI 40-49.9 (MORBID OBESITY) (HCC): ICD-10-CM

## 2024-01-31 DIAGNOSIS — D58.2 ELEVATED HEMOGLOBIN (HCC): ICD-10-CM

## 2024-01-31 DIAGNOSIS — R79.82 ELEVATED C-REACTIVE PROTEIN (CRP): ICD-10-CM

## 2024-01-31 DIAGNOSIS — M45.6 ANKYLOSING SPONDYLITIS LUMBAR REGION (HCC): ICD-10-CM

## 2024-01-31 DIAGNOSIS — M25.50 ARTHRALGIA, UNSPECIFIED JOINT: ICD-10-CM

## 2024-01-31 DIAGNOSIS — R63.5 WEIGHT GAIN, ABNORMAL: ICD-10-CM

## 2024-01-31 DIAGNOSIS — G25.81 RLS (RESTLESS LEGS SYNDROME): ICD-10-CM

## 2024-01-31 DIAGNOSIS — E87.6 HYPOKALEMIA: ICD-10-CM

## 2024-01-31 DIAGNOSIS — R71.8 OTHER ABNORMALITY OF RED BLOOD CELLS: ICD-10-CM

## 2024-01-31 DIAGNOSIS — I10 ESSENTIAL HYPERTENSION: ICD-10-CM

## 2024-01-31 LAB
ALBUMIN SERPL-MCNC: 4.3 G/DL (ref 3.5–5.2)
ALBUMIN/GLOB SERPL: 1.1 {RATIO} (ref 1–2.5)
ALP SERPL-CCNC: 86 U/L (ref 35–104)
ALT SERPL-CCNC: 24 U/L (ref 5–33)
ANION GAP SERPL CALCULATED.3IONS-SCNC: 15 MMOL/L (ref 9–17)
AST SERPL-CCNC: 35 U/L
BASOPHILS # BLD: 0.07 K/UL (ref 0–0.2)
BASOPHILS NFR BLD: 1 % (ref 0–2)
BILIRUB SERPL-MCNC: 0.8 MG/DL (ref 0.3–1.2)
BUN SERPL-MCNC: 24 MG/DL (ref 8–23)
CALCIUM SERPL-MCNC: 10.6 MG/DL (ref 8.6–10.4)
CHLORIDE SERPL-SCNC: 100 MMOL/L (ref 98–107)
CO2 SERPL-SCNC: 25 MMOL/L (ref 20–31)
CREAT SERPL-MCNC: 0.6 MG/DL (ref 0.5–0.9)
CRP SERPL HS-MCNC: 8.7 MG/L (ref 0–5)
EOSINOPHIL # BLD: 0.08 K/UL (ref 0–0.44)
EOSINOPHILS RELATIVE PERCENT: 1 % (ref 1–4)
ERYTHROCYTE [DISTWIDTH] IN BLOOD BY AUTOMATED COUNT: 13.5 % (ref 11.8–14.4)
GFR SERPL CREATININE-BSD FRML MDRD: >60 ML/MIN/1.73M2
GLUCOSE SERPL-MCNC: 115 MG/DL (ref 70–99)
HCT VFR BLD AUTO: 49.4 % (ref 36.3–47.1)
HGB BLD-MCNC: 16.5 G/DL (ref 11.9–15.1)
IMM GRANULOCYTES # BLD AUTO: 0.05 K/UL (ref 0–0.3)
IMM GRANULOCYTES NFR BLD: 0 %
LYMPHOCYTES NFR BLD: 2.23 K/UL (ref 1.1–3.7)
LYMPHOCYTES RELATIVE PERCENT: 20 % (ref 24–43)
MAGNESIUM SERPL-MCNC: 2.3 MG/DL (ref 1.6–2.6)
MCH RBC QN AUTO: 29.9 PG (ref 25.2–33.5)
MCHC RBC AUTO-ENTMCNC: 33.4 G/DL (ref 28.4–34.8)
MCV RBC AUTO: 89.5 FL (ref 82.6–102.9)
MONOCYTES NFR BLD: 0.79 K/UL (ref 0.1–1.2)
MONOCYTES NFR BLD: 7 % (ref 3–12)
NEUTROPHILS NFR BLD: 71 % (ref 36–65)
NEUTS SEG NFR BLD: 7.94 K/UL (ref 1.5–8.1)
NRBC BLD-RTO: 0 PER 100 WBC
PLATELET # BLD AUTO: 350 K/UL (ref 138–453)
PMV BLD AUTO: 12.1 FL (ref 8.1–13.5)
POTASSIUM SERPL-SCNC: 3.9 MMOL/L (ref 3.7–5.3)
PROT SERPL-MCNC: 8.2 G/DL (ref 6.4–8.3)
RBC # BLD AUTO: 5.52 M/UL (ref 3.95–5.11)
SODIUM SERPL-SCNC: 140 MMOL/L (ref 135–144)
WBC OTHER # BLD: 11.2 K/UL (ref 3.5–11.3)

## 2024-02-01 LAB
FERRITIN SERPL-MCNC: 373 NG/ML (ref 13–150)
FOLATE SERPL-MCNC: >20 NG/ML
IRON SATN MFR SERPL: 35 % (ref 20–55)
IRON SERPL-MCNC: 106 UG/DL (ref 37–145)
TIBC SERPL-MCNC: 307 UG/DL (ref 250–450)
UNSATURATED IRON BINDING CAPACITY: 201 UG/DL (ref 112–347)
VIT B12 SERPL-MCNC: 1139 PG/ML (ref 232–1245)

## 2024-02-01 RX ORDER — PREDNISONE 5 MG/1
TABLET ORAL
Qty: 21 TABLET | Refills: 0 | Status: SHIPPED | OUTPATIENT
Start: 2024-02-01 | End: 2024-02-10

## 2024-02-02 LAB
GLIADIN IGA SER IA-ACNC: 3.4 U/ML
GLIADIN IGG SER IA-ACNC: 1.7 U/ML
IGA SERPL-MCNC: 502 MG/DL (ref 70–400)
TTG IGA SER IA-ACNC: 1.4 U/ML

## 2024-02-02 NOTE — TELEPHONE ENCOUNTER
Patient initiated encounter due to chronic lumbar back pain flare up asking for steroids.   Steroids were sent to local pharmacy.  Approximately 5-10 minutes was spent within the encounter reviewing previous MAR and previous office visit notes.

## 2024-02-13 ENCOUNTER — PATIENT MESSAGE (OUTPATIENT)
Dept: FAMILY MEDICINE CLINIC | Age: 69
End: 2024-02-13

## 2024-02-19 DIAGNOSIS — D58.2 ELEVATED FERRITIN, HEMOGLOBIN, AND RED BLOOD CELL COUNT (HCC): Primary | ICD-10-CM

## 2024-02-19 DIAGNOSIS — R79.89 ELEVATED FERRITIN, HEMOGLOBIN, AND RED BLOOD CELL COUNT (HCC): Primary | ICD-10-CM

## 2024-02-19 DIAGNOSIS — R71.8 ELEVATED FERRITIN, HEMOGLOBIN, AND RED BLOOD CELL COUNT (HCC): Primary | ICD-10-CM

## 2024-03-06 ENCOUNTER — TELEPHONE (OUTPATIENT)
Dept: FAMILY MEDICINE CLINIC | Age: 69
End: 2024-03-06

## 2024-03-06 DIAGNOSIS — Z01.810 PREOPERATIVE CARDIOVASCULAR EXAMINATION: ICD-10-CM

## 2024-03-06 DIAGNOSIS — R94.31 ABNORMAL EKG: Primary | ICD-10-CM

## 2024-03-06 NOTE — TELEPHONE ENCOUNTER
Maggie notified.     She will contact patient with information. HOLD in basket for cardio information tomorrow.

## 2024-03-06 NOTE — TELEPHONE ENCOUNTER
Patient Annmarie called stated that she had pre op testing done at [facility] and they are requesting clearance from , prior to surgery tomorrow 3/7. Patient stated that her EKG- was perfect it was the pulse rate that was high. Writer consulter with [facility] She was not aware of pre-op testing or that surgery was scheduled.  requested a call into the surgeon office Dr. Nur office. Writer call Dr. Boom Acuña  and communicated  is requesting test and results. Checked Amy ness found 2 papers for testing on Annmarie . Writer communicated with REYNA Rojo gave her direct phone extension to call back with questions, [phone]

## 2024-03-06 NOTE — TELEPHONE ENCOUNTER
Covid/flu Results released in [facility].   Surgery will need postponed  She will need cardiac clearance - I had cardiology look at EKG   We will contact her with cardiology information.   I will refer to whomever can see her soonest - this week or next week     Call Esme with [facility] office and let her know as well  Ask her to please reschedule ASA for patient   [phone]

## 2024-03-07 ENCOUNTER — PATIENT MESSAGE (OUTPATIENT)
Dept: FAMILY MEDICINE CLINIC | Age: 69
End: 2024-03-07

## 2024-03-07 NOTE — TELEPHONE ENCOUNTER
From: Annmarie Riggins  To: Amy Torrez  Sent: 3/7/2024 10:13 AM EST  Subject: Cardiac assessment    , what has happened to the medical community? I am in extreme pain. Languishing day by day. My heart racing? Low potassium, fear anxiety. Are your legs going deader and weaker by the day? How much pain are you enduring by the day? My EKG and echo were good. At no other time have I exhibited fast heart rate. I did absolutely fine with my shoulder. My God, Have Mer[facility].

## 2024-03-07 NOTE — TELEPHONE ENCOUNTER
Patient given cardiology information. She will contact.     Sent in [employer] message.     New surgery date third week of march with Dr. Nur.

## 2024-03-08 DIAGNOSIS — M54.42 ACUTE BILATERAL LOW BACK PAIN WITH BILATERAL SCIATICA: ICD-10-CM

## 2024-03-08 DIAGNOSIS — M54.41 ACUTE BILATERAL LOW BACK PAIN WITH BILATERAL SCIATICA: ICD-10-CM

## 2024-03-08 RX ORDER — TIZANIDINE 4 MG/1
TABLET ORAL
Qty: 90 TABLET | Refills: 1 | Status: SHIPPED | OUTPATIENT
Start: 2024-03-08

## 2024-03-08 NOTE — TELEPHONE ENCOUNTER
LOV 1/17/24   RTO 7/17/24  LRF 1/5/24          Controlled Substance Monitoring:    Acute and Chronic Pain Monitoring:   RX Monitoring Periodic Controlled Substance Monitoring Chronic Pain > 50 MEDD   11/29/2022   7:24 AM Obtaining appropriate analgesic effect of treatment. Re-evaluated the status of the patient's underlying condition causing pain.

## 2024-04-06 DIAGNOSIS — E87.6 HYPOKALEMIA: ICD-10-CM

## 2024-04-08 NOTE — TELEPHONE ENCOUNTER
LOV 1/17/24  RTO 6 months; F/U scheduled  LRF 10/23/23    Health Maintenance   Topic Date Due    Colorectal Cancer Screen  Never done    Shingles vaccine (1 of 2) Never done    Respiratory Syncytial Virus (RSV) Pregnant or age 60 yrs+ (1 - 1-dose 60+ series) Never done    Breast cancer screen  06/12/2015    Pneumococcal 65+ years Vaccine (1 of 1 - PCV) Never done    COVID-19 Vaccine (4 - 2023-24 season) 09/01/2023    DEXA (modify frequency per FRAX score)  01/23/2025 (Originally 9/19/2009)    Flu vaccine (Season Ended) 08/01/2024    Depression Screen  01/17/2025    Annual Wellness Visit (Medicare)  01/17/2025    Diabetes screen  05/03/2026    Lipids  05/03/2028    DTaP/Tdap/Td vaccine (2 - Td or Tdap) 11/12/2029    Hepatitis A vaccine  Aged Out    Hepatitis B vaccine  Aged Out    Hib vaccine  Aged Out    Polio vaccine  Aged Out    Meningococcal (ACWY) vaccine  Aged Out    Hepatitis C screen  Discontinued             (applicable per patient's age: Cancer Screenings, Depression Screening, Fall Risk Screening, Immunizations)    Hemoglobin A1C (%)   Date Value   08/22/2018 5.4     LDL Cholesterol (mg/dL)   Date Value   05/03/2023 177 (H)     LDL Calculated (mg/dL)   Date Value   04/03/2019 212 (A)     AST (U/L)   Date Value   01/31/2024 35 (H)     ALT (U/L)   Date Value   01/31/2024 24     BUN (mg/dL)   Date Value   01/31/2024 24 (H)      (goal A1C is < 7)   (goal LDL is <100) need 30-50% reduction from baseline     BP Readings from Last 3 Encounters:   01/17/24 120/86   09/28/23 126/84   05/17/23 130/82    (goal /80)      All Future Testing planned in [facility]:  Lab Frequency Next Occurrence   CBC Once 02/19/2024   Ferritin Once 02/19/2024   PTH, Intact with Ionized Calcium Once 02/19/2024       Next Visit Date:  Future Appointments   Date Time Provider Department Center   7/17/2024 10:30 AM Amy Torrez, APRN - CNP [facility]            Patient Active Problem List:     Essential hypertension

## 2024-04-09 RX ORDER — POTASSIUM CHLORIDE 750 MG/1
10 TABLET, EXTENDED RELEASE ORAL DAILY
Qty: 90 TABLET | Refills: 1 | Status: SHIPPED | OUTPATIENT
Start: 2024-04-09

## 2024-06-17 RX ORDER — DICYCLOMINE HYDROCHLORIDE 10 MG/1
10 CAPSULE ORAL DAILY
Qty: 30 CAPSULE | Refills: 5 | Status: SHIPPED | OUTPATIENT
Start: 2024-06-17

## 2024-06-17 NOTE — TELEPHONE ENCOUNTER
LOV 1/17/24  RTO 6 months; F/U scheduled  LRF 12/11/23    Health Maintenance   Topic Date Due    Colorectal Cancer Screen  Never done    Shingles vaccine (1 of 2) Never done    Respiratory Syncytial Virus (RSV) Pregnant or age 60 yrs+ (1 - 1-dose 60+ series) Never done    Breast cancer screen  06/12/2015    Pneumococcal 65+ years Vaccine (1 of 1 - PCV) Never done    COVID-19 Vaccine (4 - 2023-24 season) 09/01/2023    DEXA (modify frequency per FRAX score)  01/23/2025 (Originally 9/19/2009)    Flu vaccine (Season Ended) 08/01/2024    Depression Screen  01/17/2025    Annual Wellness Visit (Medicare)  01/17/2025    Diabetes screen  05/03/2026    Lipids  05/03/2028    DTaP/Tdap/Td vaccine (2 - Td or Tdap) 11/12/2029    Hepatitis A vaccine  Aged Out    Hepatitis B vaccine  Aged Out    Hib vaccine  Aged Out    Polio vaccine  Aged Out    Meningococcal (ACWY) vaccine  Aged Out    Hepatitis C screen  Discontinued             (applicable per patient's age: Cancer Screenings, Depression Screening, Fall Risk Screening, Immunizations)    Hemoglobin A1C (%)   Date Value   08/22/2018 5.4     AST (U/L)   Date Value   01/31/2024 35 (H)     ALT (U/L)   Date Value   01/31/2024 24     BUN (mg/dL)   Date Value   01/31/2024 24 (H)      (goal A1C is < 7)   (goal LDL is <100) need 30-50% reduction from baseline     BP Readings from Last 3 Encounters:   01/17/24 120/86   09/28/23 126/84   05/17/23 130/82    (goal /80)      All Future Testing planned in [facility]:  Lab Frequency Next Occurrence   CBC Once 02/19/2024   Ferritin Once 02/19/2024   PTH, Intact with Ionized Calcium Once 02/19/2024       Next Visit Date:  Future Appointments   Date Time Provider Department Center   7/17/2024 10:30 AM Amy Torrez, APRN - CNP [facility]            Patient Active Problem List:     Essential hypertension     Medication therapy management recommendation refused by patient     Mixed hyperlipidemia     Spinal stenosis of lumbar region

## 2024-07-01 DIAGNOSIS — M54.41 ACUTE BILATERAL LOW BACK PAIN WITH BILATERAL SCIATICA: ICD-10-CM

## 2024-07-01 DIAGNOSIS — I10 ESSENTIAL HYPERTENSION: Chronic | ICD-10-CM

## 2024-07-01 DIAGNOSIS — M54.42 ACUTE BILATERAL LOW BACK PAIN WITH BILATERAL SCIATICA: ICD-10-CM

## 2024-07-02 RX ORDER — SPIRONOLACTONE AND HYDROCHLOROTHIAZIDE 25; 25 MG/1; MG/1
TABLET ORAL
Qty: 90 TABLET | Refills: 1 | Status: SHIPPED | OUTPATIENT
Start: 2024-07-02 | End: 2025-07-03

## 2024-07-02 RX ORDER — DULOXETIN HYDROCHLORIDE 60 MG/1
CAPSULE, DELAYED RELEASE ORAL
Qty: 90 CAPSULE | Refills: 1 | Status: SHIPPED | OUTPATIENT
Start: 2024-07-02 | End: 2025-07-03

## 2024-07-02 NOTE — TELEPHONE ENCOUNTER
LOV 1/17/24  RTO 6 months; F/U scheduled  LRF 1/2/24 and 1/5/24    Health Maintenance   Topic Date Due    Colorectal Cancer Screen  Never done    Shingles vaccine (1 of 2) Never done    Respiratory Syncytial Virus (RSV) Pregnant or age 60 yrs+ (1 - 1-dose 60+ series) Never done    Breast cancer screen  06/12/2015    Pneumococcal 65+ years Vaccine (1 of 1 - PCV) Never done    COVID-19 Vaccine (4 - 2023-24 season) 09/01/2023    DEXA (modify frequency per FRAX score)  01/23/2025 (Originally 9/19/2009)    Flu vaccine (1) 08/01/2024    Depression Screen  01/17/2025    Annual Wellness Visit (Medicare)  01/17/2025    Diabetes screen  05/03/2026    Lipids  05/03/2028    DTaP/Tdap/Td vaccine (2 - Td or Tdap) 11/12/2029    Hepatitis A vaccine  Aged Out    Hepatitis B vaccine  Aged Out    Hib vaccine  Aged Out    Polio vaccine  Aged Out    Meningococcal (ACWY) vaccine  Aged Out    Hepatitis C screen  Discontinued             (applicable per patient's age: Cancer Screenings, Depression Screening, Fall Risk Screening, Immunizations)    Hemoglobin A1C (%)   Date Value   08/22/2018 5.4     AST (U/L)   Date Value   01/31/2024 35 (H)     ALT (U/L)   Date Value   01/31/2024 24     BUN (mg/dL)   Date Value   01/31/2024 24 (H)      (goal A1C is < 7)   (goal LDL is <100) need 30-50% reduction from baseline     BP Readings from Last 3 Encounters:   01/17/24 120/86   09/28/23 126/84   05/17/23 130/82    (goal /80)      All Future Testing planned in [facility]:  Lab Frequency Next Occurrence   CBC Once 02/19/2024   Ferritin Once 02/19/2024   PTH, Intact with Ionized Calcium Once 02/19/2024       Next Visit Date:  Future Appointments   Date Time Provider Department Center   7/17/2024 10:30 AM Amy Torrez, APRN - CNP [facility]            Patient Active Problem List:     Essential hypertension     Medication therapy management recommendation refused by patient     Mixed hyperlipidemia     Spinal stenosis of lumbar region

## 2024-07-14 SDOH — ECONOMIC STABILITY: FOOD INSECURITY: WITHIN THE PAST 12 MONTHS, YOU WORRIED THAT YOUR FOOD WOULD RUN OUT BEFORE YOU GOT MONEY TO BUY MORE.: NEVER TRUE

## 2024-07-14 SDOH — ECONOMIC STABILITY: INCOME INSECURITY: HOW HARD IS IT FOR YOU TO PAY FOR THE VERY BASICS LIKE FOOD, HOUSING, MEDICAL CARE, AND HEATING?: NOT VERY HARD

## 2024-07-14 SDOH — ECONOMIC STABILITY: FOOD INSECURITY: WITHIN THE PAST 12 MONTHS, THE FOOD YOU BOUGHT JUST DIDN'T LAST AND YOU DIDN'T HAVE MONEY TO GET MORE.: NEVER TRUE

## 2024-07-17 ENCOUNTER — OFFICE VISIT (OUTPATIENT)
Dept: FAMILY MEDICINE CLINIC | Age: 69
End: 2024-07-17
Payer: MEDICARE

## 2024-07-17 VITALS
SYSTOLIC BLOOD PRESSURE: 118 MMHG | TEMPERATURE: 97.2 F | HEIGHT: 67 IN | RESPIRATION RATE: 20 BRPM | OXYGEN SATURATION: 97 % | WEIGHT: 262 LBS | DIASTOLIC BLOOD PRESSURE: 80 MMHG | HEART RATE: 73 BPM | BODY MASS INDEX: 41.12 KG/M2

## 2024-07-17 DIAGNOSIS — E87.6 HYPOKALEMIA: ICD-10-CM

## 2024-07-17 DIAGNOSIS — D58.2 ELEVATED HEMOGLOBIN (HCC): ICD-10-CM

## 2024-07-17 DIAGNOSIS — E78.2 MIXED HYPERLIPIDEMIA: ICD-10-CM

## 2024-07-17 DIAGNOSIS — Z13.31 SCREENING FOR DEPRESSION: ICD-10-CM

## 2024-07-17 DIAGNOSIS — M48.061 SPINAL STENOSIS OF LUMBAR REGION WITHOUT NEUROGENIC CLAUDICATION: ICD-10-CM

## 2024-07-17 DIAGNOSIS — M54.41 ACUTE BILATERAL LOW BACK PAIN WITH BILATERAL SCIATICA: ICD-10-CM

## 2024-07-17 DIAGNOSIS — M45.6 ANKYLOSING SPONDYLITIS LUMBAR REGION (HCC): ICD-10-CM

## 2024-07-17 DIAGNOSIS — Z53.20 MAMMOGRAM DECLINED: ICD-10-CM

## 2024-07-17 DIAGNOSIS — M54.42 ACUTE BILATERAL LOW BACK PAIN WITH BILATERAL SCIATICA: ICD-10-CM

## 2024-07-17 DIAGNOSIS — E66.01 OBESITY, CLASS III, BMI 40-49.9 (MORBID OBESITY) (HCC): ICD-10-CM

## 2024-07-17 DIAGNOSIS — Z53.20 COLONOSCOPY REFUSED: ICD-10-CM

## 2024-07-17 DIAGNOSIS — I10 ESSENTIAL HYPERTENSION: Primary | ICD-10-CM

## 2024-07-17 DIAGNOSIS — F41.9 ANXIETY: ICD-10-CM

## 2024-07-17 DIAGNOSIS — H69.91 EUSTACHIAN TUBE DYSFUNCTION, RIGHT: ICD-10-CM

## 2024-07-17 DIAGNOSIS — E55.9 VITAMIN D DEFICIENCY: ICD-10-CM

## 2024-07-17 PROBLEM — M43.16 SPONDYLOLISTHESIS, LUMBAR REGION: Status: ACTIVE | Noted: 2024-03-27

## 2024-07-17 PROCEDURE — 99214 OFFICE O/P EST MOD 30 MIN: CPT | Performed by: NURSE PRACTITIONER

## 2024-07-17 PROCEDURE — G8400 PT W/DXA NO RESULTS DOC: HCPCS | Performed by: NURSE PRACTITIONER

## 2024-07-17 PROCEDURE — 3017F COLORECTAL CA SCREEN DOC REV: CPT | Performed by: NURSE PRACTITIONER

## 2024-07-17 PROCEDURE — G8417 CALC BMI ABV UP PARAM F/U: HCPCS | Performed by: NURSE PRACTITIONER

## 2024-07-17 PROCEDURE — G2211 COMPLEX E/M VISIT ADD ON: HCPCS | Performed by: NURSE PRACTITIONER

## 2024-07-17 PROCEDURE — G8427 DOCREV CUR MEDS BY ELIG CLIN: HCPCS | Performed by: NURSE PRACTITIONER

## 2024-07-17 PROCEDURE — 1123F ACP DISCUSS/DSCN MKR DOCD: CPT | Performed by: NURSE PRACTITIONER

## 2024-07-17 PROCEDURE — 1090F PRES/ABSN URINE INCON ASSESS: CPT | Performed by: NURSE PRACTITIONER

## 2024-07-17 PROCEDURE — 1036F TOBACCO NON-USER: CPT | Performed by: NURSE PRACTITIONER

## 2024-07-17 PROCEDURE — 3079F DIAST BP 80-89 MM HG: CPT | Performed by: NURSE PRACTITIONER

## 2024-07-17 PROCEDURE — 3074F SYST BP LT 130 MM HG: CPT | Performed by: NURSE PRACTITIONER

## 2024-07-17 RX ORDER — FLUTICASONE PROPIONATE 50 MCG
1 SPRAY, SUSPENSION (ML) NASAL DAILY
Qty: 32 G | Refills: 1 | Status: SHIPPED | OUTPATIENT
Start: 2024-07-17

## 2024-07-17 RX ORDER — OXYCODONE HYDROCHLORIDE AND ACETAMINOPHEN 5; 325 MG/1; MG/1
1 TABLET ORAL 2 TIMES DAILY
COMMUNITY
Start: 2024-06-28

## 2024-07-17 ASSESSMENT — PATIENT HEALTH QUESTIONNAIRE - PHQ9
1. LITTLE INTEREST OR PLEASURE IN DOING THINGS: NOT AT ALL
2. FEELING DOWN, DEPRESSED OR HOPELESS: NOT AT ALL
SUM OF ALL RESPONSES TO PHQ QUESTIONS 1-9: 0
SUM OF ALL RESPONSES TO PHQ9 QUESTIONS 1 & 2: 0
SUM OF ALL RESPONSES TO PHQ QUESTIONS 1-9: 0

## 2024-07-17 NOTE — PROGRESS NOTES
numbness to feet - there is more feeling overall   Comes and goes - where able to feel all toes     Still needing percocet twice daily - ordered per ortho     6/25/24 ortho -  Annmarie Riggins is a 68 y.o. female who presents for a follow up in regards to her lower back symptoms. We had last seen her on 5/9/2024. Patient underwent a TLIF of L4-L5 She denies any new onset gait or bowel and bladder changes. Patient states that therapy seems to be improving. She feels there is a about 50% improvement as far as the numbness in both of her feet.     We feel the patient is doing very well. She will continue with formal physical therapy and strengthening. We feel the patient is doing very well, therefore, we are going to see the patient back as needed. All questions were answered during today's visit.     Review of Systems  Review of Systems   Constitutional:  Positive for fatigue. Negative for activity change, appetite change, chills and fever.   HENT:  Positive for sinus pain (right side). Negative for congestion and dental problem.         Current dental exams    Eyes:         Current eye exams    Respiratory:  Positive for shortness of breath (w/exertion). Negative for cough and chest tightness.    Cardiovascular:  Positive for palpitations and leg swelling. Negative for chest pain.   Gastrointestinal:  Negative for abdominal distention, abdominal pain, blood in stool, constipation, diarrhea and nausea.   Genitourinary:  Positive for frequency and urgency. Negative for difficulty urinating, dysuria and hematuria.        Mixed incontinence  Wears pad    Musculoskeletal:  Positive for back pain and gait problem. Negative for arthralgias and myalgias.        Following with Orthopedic and now in PT   Still using cane   Neurological:  Negative for dizziness, weakness (improving), light-headedness, numbness (BLE - improving) and headaches.   Psychiatric/Behavioral:  Negative for agitation, decreased concentration,

## 2024-09-11 DIAGNOSIS — M54.41 ACUTE BILATERAL LOW BACK PAIN WITH BILATERAL SCIATICA: ICD-10-CM

## 2024-09-11 DIAGNOSIS — M54.42 ACUTE BILATERAL LOW BACK PAIN WITH BILATERAL SCIATICA: ICD-10-CM

## 2024-09-13 RX ORDER — DULOXETIN HYDROCHLORIDE 60 MG/1
60 CAPSULE, DELAYED RELEASE ORAL DAILY
Qty: 90 CAPSULE | Refills: 1 | Status: SHIPPED | OUTPATIENT
Start: 2024-09-13 | End: 2025-09-14

## 2024-09-26 ENCOUNTER — E-VISIT (OUTPATIENT)
Dept: FAMILY MEDICINE CLINIC | Age: 69
End: 2024-09-26
Payer: MEDICARE

## 2024-09-26 DIAGNOSIS — B96.89 ACUTE BACTERIAL SINUSITIS: Primary | ICD-10-CM

## 2024-09-26 DIAGNOSIS — J01.90 ACUTE BACTERIAL SINUSITIS: Primary | ICD-10-CM

## 2024-09-26 PROCEDURE — 99421 OL DIG E/M SVC 5-10 MIN: CPT | Performed by: NURSE PRACTITIONER

## 2024-09-26 ASSESSMENT — LIFESTYLE VARIABLES: SMOKING_STATUS: NO, I'VE NEVER SMOKED

## 2024-09-27 RX ORDER — AZITHROMYCIN 250 MG/1
TABLET, FILM COATED ORAL
Qty: 6 TABLET | Refills: 0 | Status: SHIPPED | OUTPATIENT
Start: 2024-09-27 | End: 2024-10-07

## 2024-10-06 DIAGNOSIS — E87.6 HYPOKALEMIA: ICD-10-CM

## 2024-10-08 RX ORDER — POTASSIUM CHLORIDE 750 MG/1
10 TABLET, EXTENDED RELEASE ORAL DAILY
Qty: 90 TABLET | Refills: 1 | Status: SHIPPED | OUTPATIENT
Start: 2024-10-08

## 2024-10-08 NOTE — TELEPHONE ENCOUNTER
LOV 9/26/24   RTO 1/22/25  LRF 4/9/24          Controlled Substance Monitoring:    Acute and Chronic Pain Monitoring:   RX Monitoring Periodic Controlled Substance Monitoring Chronic Pain > 50 MEDD   11/29/2022   7:24 AM Obtaining appropriate analgesic effect of treatment. Re-evaluated the status of the patient's underlying condition causing pain.

## 2024-10-16 ENCOUNTER — PATIENT MESSAGE (OUTPATIENT)
Dept: FAMILY MEDICINE CLINIC | Age: 69
End: 2024-10-16

## 2024-10-16 DIAGNOSIS — M48.061 FORAMINAL STENOSIS OF LUMBAR REGION: ICD-10-CM

## 2024-10-16 DIAGNOSIS — M48.061 SPINAL STENOSIS OF LUMBAR REGION WITHOUT NEUROGENIC CLAUDICATION: Primary | ICD-10-CM

## 2024-10-16 DIAGNOSIS — M45.6 ANKYLOSING SPONDYLITIS LUMBAR REGION (HCC): ICD-10-CM

## 2024-10-24 ENCOUNTER — HOSPITAL ENCOUNTER (OUTPATIENT)
Age: 69
Setting detail: SPECIMEN
Discharge: HOME | End: 2024-10-24

## 2024-10-24 DIAGNOSIS — E55.9 VITAMIN D DEFICIENCY: ICD-10-CM

## 2024-10-24 DIAGNOSIS — E78.2 MIXED HYPERLIPIDEMIA: ICD-10-CM

## 2024-10-24 DIAGNOSIS — E87.6 HYPOKALEMIA: ICD-10-CM

## 2024-10-24 DIAGNOSIS — D58.2 ELEVATED HEMOGLOBIN (HCC): ICD-10-CM

## 2024-10-24 DIAGNOSIS — F41.9 ANXIETY: ICD-10-CM

## 2024-10-24 DIAGNOSIS — I10 ESSENTIAL HYPERTENSION: ICD-10-CM

## 2024-10-24 LAB
25(OH)D3 SERPL-MCNC: 28.3 NG/ML (ref 30–100)
ALBUMIN SERPL-MCNC: 4 G/DL (ref 3.5–5.2)
ALBUMIN/GLOB SERPL: 1 {RATIO} (ref 1–2.5)
ALP SERPL-CCNC: 83 U/L (ref 35–104)
ALT SERPL-CCNC: 35 U/L (ref 10–35)
ANION GAP SERPL CALCULATED.3IONS-SCNC: 11 MMOL/L (ref 9–16)
AST SERPL-CCNC: 57 U/L (ref 10–35)
BASOPHILS # BLD: 0.03 K/UL (ref 0–0.2)
BASOPHILS NFR BLD: 1 % (ref 0–2)
BILIRUB SERPL-MCNC: 0.4 MG/DL (ref 0–1.2)
BUN SERPL-MCNC: 25 MG/DL (ref 8–23)
CALCIUM SERPL-MCNC: 9.5 MG/DL (ref 8.6–10.4)
CHLORIDE SERPL-SCNC: 101 MMOL/L (ref 98–107)
CHOLEST SERPL-MCNC: 279 MG/DL (ref 0–199)
CHOLESTEROL/HDL RATIO: 5
CO2 SERPL-SCNC: 27 MMOL/L (ref 20–31)
CREAT SERPL-MCNC: 0.6 MG/DL (ref 0.5–0.9)
EOSINOPHIL # BLD: 0.13 K/UL (ref 0–0.44)
EOSINOPHILS RELATIVE PERCENT: 2 % (ref 1–4)
ERYTHROCYTE [DISTWIDTH] IN BLOOD BY AUTOMATED COUNT: 13.5 % (ref 11.8–14.4)
GFR, ESTIMATED: >90 ML/MIN/1.73M2
GLUCOSE P FAST SERPL-MCNC: 92 MG/DL (ref 74–99)
HCT VFR BLD AUTO: 45.1 % (ref 36.3–47.1)
HDLC SERPL-MCNC: 56 MG/DL
HGB BLD-MCNC: 14.7 G/DL (ref 11.9–15.1)
IMM GRANULOCYTES # BLD AUTO: 0.04 K/UL (ref 0–0.3)
IMM GRANULOCYTES NFR BLD: 1 %
LDLC SERPL CALC-MCNC: 210 MG/DL (ref 0–100)
LYMPHOCYTES NFR BLD: 1.71 K/UL (ref 1.1–3.7)
LYMPHOCYTES RELATIVE PERCENT: 26 % (ref 24–43)
MAGNESIUM SERPL-MCNC: 2 MG/DL (ref 1.6–2.4)
MCH RBC QN AUTO: 29.6 PG (ref 25.2–33.5)
MCHC RBC AUTO-ENTMCNC: 32.6 G/DL (ref 28.4–34.8)
MCV RBC AUTO: 90.7 FL (ref 82.6–102.9)
MONOCYTES NFR BLD: 0.63 K/UL (ref 0.1–1.2)
MONOCYTES NFR BLD: 10 % (ref 3–12)
NEUTROPHILS NFR BLD: 60 % (ref 36–65)
NEUTS SEG NFR BLD: 4.07 K/UL (ref 1.5–8.1)
NRBC BLD-RTO: 0 PER 100 WBC
PLATELET # BLD AUTO: 302 K/UL (ref 138–453)
PMV BLD AUTO: 12.4 FL (ref 8.1–13.5)
POTASSIUM SERPL-SCNC: 4.1 MMOL/L (ref 3.7–5.3)
PROT SERPL-MCNC: 7.4 G/DL (ref 6.6–8.7)
PTH-INTACT SERPL-MCNC: 33 PG/ML (ref 15–65)
RBC # BLD AUTO: 4.97 M/UL (ref 3.95–5.11)
SODIUM SERPL-SCNC: 139 MMOL/L (ref 136–145)
T4 FREE SERPL-MCNC: 1.3 NG/DL (ref 0.92–1.68)
TRIGL SERPL-MCNC: 67 MG/DL
TSH SERPL DL<=0.05 MIU/L-ACNC: 0.79 UIU/ML (ref 0.27–4.2)
VLDLC SERPL CALC-MCNC: 13 MG/DL
WBC OTHER # BLD: 6.6 K/UL (ref 3.5–11.3)

## 2024-12-11 DIAGNOSIS — M48.061 NEURAL FORAMINAL STENOSIS OF LUMBAR SPINE: ICD-10-CM

## 2024-12-11 RX ORDER — CELECOXIB 100 MG/1
100 CAPSULE ORAL 2 TIMES DAILY
Qty: 180 CAPSULE | Refills: 1 | Status: SHIPPED | OUTPATIENT
Start: 2024-12-11

## 2024-12-11 NOTE — TELEPHONE ENCOUNTER
Patient is also requesting a Handicap Placard and is requesting to be mailed to her.        LOV 7/1/24   RTO 1/10/25 Becky  LRF 10/16/2023                Controlled Substance Monitoring:    Acute and Chronic Pain Monitoring:   RX Monitoring Periodic Controlled Substance Monitoring Chronic Pain > 50 MEDD   11/29/2022   7:24 AM Obtaining appropriate analgesic effect of treatment. Re-evaluated the status of the patient's underlying condition causing pain.

## 2025-01-03 ENCOUNTER — HOSPITAL ENCOUNTER (OUTPATIENT)
Age: 70
Setting detail: SPECIMEN
Discharge: HOME | End: 2025-01-03

## 2025-01-03 DIAGNOSIS — R79.89 ELEVATED FERRITIN, HEMOGLOBIN, AND RED BLOOD CELL COUNT (HCC): ICD-10-CM

## 2025-01-03 DIAGNOSIS — D58.2 ELEVATED FERRITIN, HEMOGLOBIN, AND RED BLOOD CELL COUNT (HCC): ICD-10-CM

## 2025-01-03 DIAGNOSIS — R71.8 ELEVATED FERRITIN, HEMOGLOBIN, AND RED BLOOD CELL COUNT (HCC): ICD-10-CM

## 2025-01-03 LAB
CA-I BLD-SCNC: 1.2 MMOL/L (ref 1.13–1.33)
ERYTHROCYTE [DISTWIDTH] IN BLOOD BY AUTOMATED COUNT: 13.2 % (ref 11.8–14.4)
FERRITIN SERPL-MCNC: 281 NG/ML
HCT VFR BLD AUTO: 48.9 % (ref 36.3–47.1)
HGB BLD-MCNC: 15.4 G/DL (ref 11.9–15.1)
MCH RBC QN AUTO: 28.9 PG (ref 25.2–33.5)
MCHC RBC AUTO-ENTMCNC: 31.5 G/DL (ref 28.4–34.8)
MCV RBC AUTO: 91.9 FL (ref 82.6–102.9)
NRBC BLD-RTO: 0 PER 100 WBC
PLATELET # BLD AUTO: 327 K/UL (ref 138–453)
PMV BLD AUTO: 12.4 FL (ref 8.1–13.5)
PTH-INTACT SERPL-MCNC: 39 PG/ML (ref 15–65)
RBC # BLD AUTO: 5.32 M/UL (ref 3.95–5.11)
WBC OTHER # BLD: 8.9 K/UL (ref 3.5–11.3)

## 2025-01-06 ASSESSMENT — ENCOUNTER SYMPTOMS
EYE PAIN: 0
ABDOMINAL DISTENTION: 0
VOMITING: 0
EYE REDNESS: 0
SHORTNESS OF BREATH: 0
CHEST TIGHTNESS: 0
SORE THROAT: 0
EYE DISCHARGE: 0
NAUSEA: 0
COUGH: 0
DIARRHEA: 0
WHEEZING: 0
COLOR CHANGE: 0
TROUBLE SWALLOWING: 0
ABDOMINAL PAIN: 0
RHINORRHEA: 0

## 2025-01-06 NOTE — PROGRESS NOTES
Annmarie Riggins is a 69 y.o. female who presents in office today with Self establish new care with office. Previous PCP was Amy Torrez.    Chief Complaint   Patient presents with    Established New Doctor     Previous PCP RENAN Fraser         History of Present Illness:     HPI    Patient here to establish care, previous PCP was Amy Torrez.  Last office visit was July 2024.    History of hypertension, on Aldactazide.  Blood pressure today is significantly elevated at 160/100, repeat blood pressure 140/90 denies any vision changes or headaches.  Tells me she was just her pain management doctor on Wednesday and her blood pressure was 120s.  She does have a blood pressure cuff at home, also admits to significant whitecoat syndrome.      Has been having pain in her right frontal sinus, tells me she has a lot of phlegm production and horrid smelling breath.  She tells me she has a new machine that she is going to use to clear her mucus, if not she would like to go to ENT, she has me she has had sinus surgeries in the past.    Follows with pain management for lower back pain monthly.  Currently taking Percocet.  Also has a history of osteoarthritis for which she is taking Celebrex, she is wondering if this is even working.    Repeat lab work is showing elevated hemoglobin and hematocrit, she also has a history of high ferritin, she is recommended to see hematology oncology, refusing referral at this time.    Does see dermatology, tells me she is going to schedule for a full skin exam.  Has had skin biopsies in the past which have come back negative.    She is currently refusing mammogram and colonoscopy, tells me that she does not want to be screened.  Tells me she wants to live her life fully until she dies, and does not want to undergo any cancer treatments.      Care gaps:   Colon CA screening: Due  Vaccines: Flu vaccine today  Hepatitis C/HIV screens: low risk   Breast CA screening: Due  OB/GYN,

## 2025-01-07 SDOH — HEALTH STABILITY: PHYSICAL HEALTH: ON AVERAGE, HOW MANY DAYS PER WEEK DO YOU ENGAGE IN MODERATE TO STRENUOUS EXERCISE (LIKE A BRISK WALK)?: 7 DAYS

## 2025-01-07 SDOH — HEALTH STABILITY: PHYSICAL HEALTH: ON AVERAGE, HOW MANY MINUTES DO YOU ENGAGE IN EXERCISE AT THIS LEVEL?: 20 MIN

## 2025-01-10 ENCOUNTER — OFFICE VISIT (OUTPATIENT)
Dept: FAMILY MEDICINE CLINIC | Age: 70
End: 2025-01-10
Payer: MEDICARE

## 2025-01-10 VITALS
DIASTOLIC BLOOD PRESSURE: 90 MMHG | RESPIRATION RATE: 18 BRPM | SYSTOLIC BLOOD PRESSURE: 140 MMHG | BODY MASS INDEX: 41.57 KG/M2 | TEMPERATURE: 97.1 F | HEART RATE: 94 BPM | OXYGEN SATURATION: 97 % | WEIGHT: 265.4 LBS

## 2025-01-10 DIAGNOSIS — R79.89 ELEVATED FERRITIN: ICD-10-CM

## 2025-01-10 DIAGNOSIS — Z53.20 COLONOSCOPY REFUSED: ICD-10-CM

## 2025-01-10 DIAGNOSIS — D58.2 ELEVATED HEMOGLOBIN (HCC): ICD-10-CM

## 2025-01-10 DIAGNOSIS — Z53.20 MAMMOGRAM DECLINED: ICD-10-CM

## 2025-01-10 DIAGNOSIS — M19.019 SHOULDER ARTHRITIS: ICD-10-CM

## 2025-01-10 DIAGNOSIS — I10 ESSENTIAL HYPERTENSION: ICD-10-CM

## 2025-01-10 DIAGNOSIS — M45.6 ANKYLOSING SPONDYLITIS LUMBAR REGION (HCC): ICD-10-CM

## 2025-01-10 DIAGNOSIS — Z23 NEED FOR INFLUENZA VACCINATION: ICD-10-CM

## 2025-01-10 DIAGNOSIS — Z76.89 ENCOUNTER TO ESTABLISH CARE: Primary | ICD-10-CM

## 2025-01-10 DIAGNOSIS — M19.011 OSTEOARTHRITIS OF RIGHT GLENOHUMERAL JOINT: ICD-10-CM

## 2025-01-10 PROCEDURE — G8417 CALC BMI ABV UP PARAM F/U: HCPCS

## 2025-01-10 PROCEDURE — 3080F DIAST BP >= 90 MM HG: CPT

## 2025-01-10 PROCEDURE — 1090F PRES/ABSN URINE INCON ASSESS: CPT

## 2025-01-10 PROCEDURE — 3077F SYST BP >= 140 MM HG: CPT

## 2025-01-10 PROCEDURE — 1036F TOBACCO NON-USER: CPT

## 2025-01-10 PROCEDURE — 1125F AMNT PAIN NOTED PAIN PRSNT: CPT

## 2025-01-10 PROCEDURE — G0008 ADMIN INFLUENZA VIRUS VAC: HCPCS

## 2025-01-10 PROCEDURE — 3017F COLORECTAL CA SCREEN DOC REV: CPT

## 2025-01-10 PROCEDURE — 1160F RVW MEDS BY RX/DR IN RCRD: CPT

## 2025-01-10 PROCEDURE — G8400 PT W/DXA NO RESULTS DOC: HCPCS

## 2025-01-10 PROCEDURE — 1159F MED LIST DOCD IN RCRD: CPT

## 2025-01-10 PROCEDURE — 99214 OFFICE O/P EST MOD 30 MIN: CPT

## 2025-01-10 PROCEDURE — 1123F ACP DISCUSS/DSCN MKR DOCD: CPT

## 2025-01-10 PROCEDURE — 90653 IIV ADJUVANT VACCINE IM: CPT

## 2025-01-10 PROCEDURE — G8427 DOCREV CUR MEDS BY ELIG CLIN: HCPCS

## 2025-01-10 ASSESSMENT — PATIENT HEALTH QUESTIONNAIRE - PHQ9
SUM OF ALL RESPONSES TO PHQ QUESTIONS 1-9: 0
SUM OF ALL RESPONSES TO PHQ QUESTIONS 1-9: 0
1. LITTLE INTEREST OR PLEASURE IN DOING THINGS: NOT AT ALL
SUM OF ALL RESPONSES TO PHQ9 QUESTIONS 1 & 2: 0
2. FEELING DOWN, DEPRESSED OR HOPELESS: NOT AT ALL
SUM OF ALL RESPONSES TO PHQ QUESTIONS 1-9: 0
SUM OF ALL RESPONSES TO PHQ QUESTIONS 1-9: 0

## 2025-01-10 ASSESSMENT — ENCOUNTER SYMPTOMS
SINUS PAIN: 1
BACK PAIN: 1

## 2025-01-24 ENCOUNTER — LAB (OUTPATIENT)
Dept: FAMILY MEDICINE CLINIC | Age: 70
End: 2025-01-24

## 2025-01-24 VITALS — HEART RATE: 68 BPM | OXYGEN SATURATION: 97 % | DIASTOLIC BLOOD PRESSURE: 78 MMHG | SYSTOLIC BLOOD PRESSURE: 136 MMHG

## 2025-01-24 DIAGNOSIS — I10 ESSENTIAL HYPERTENSION: Chronic | ICD-10-CM

## 2025-01-24 RX ORDER — SPIRONOLACTONE AND HYDROCHLOROTHIAZIDE 25; 25 MG/1; MG/1
1 TABLET ORAL DAILY
Qty: 90 TABLET | Refills: 1 | Status: SHIPPED | OUTPATIENT
Start: 2025-01-24 | End: 2026-01-25

## 2025-01-24 RX ORDER — DICYCLOMINE HYDROCHLORIDE 10 MG/1
10 CAPSULE ORAL DAILY
Qty: 30 CAPSULE | Refills: 5 | Status: SHIPPED | OUTPATIENT
Start: 2025-01-24

## 2025-01-24 NOTE — TELEPHONE ENCOUNTER
LOV 1/10/25   RTO 7/9/25  LRF 6/17/24, 7/2/24          Controlled Substance Monitoring:    Acute and Chronic Pain Monitoring:   RX Monitoring Periodic Controlled Substance Monitoring Chronic Pain > 50 MEDD   11/29/2022   7:24 AM Obtaining appropriate analgesic effect of treatment. Re-evaluated the status of the patient's underlying condition causing pain.

## 2025-01-24 NOTE — PROGRESS NOTES
Patient here today for a blood pressure check since last appointment with PCP.    Patient denies any complaints of symptoms such as headache, blurred vision, nausea, lightheadedness.      Patient is currently taking Aldactazide medication. The patient took the medication this morning.     The patients blood pressure today is 136/78 and the pulse is 68.     Pt brought in BP log, Scanned into chart    Becky  consulted and notified of blood pressure and no symptoms. Becky advised continue current medications.      Writer discussed with patient physician’s plan.  Patient verbalized understanding.  Patient to follow up with Becky on 7/9/25.

## 2025-01-24 NOTE — PROGRESS NOTES
Patient presents in the office today with self for BP check. She is alert, oriented and dressed appropriate.

## 2025-02-03 ENCOUNTER — E-VISIT (OUTPATIENT)
Dept: FAMILY MEDICINE CLINIC | Age: 70
End: 2025-02-03
Payer: MEDICARE

## 2025-02-03 DIAGNOSIS — R05.1 ACUTE COUGH: Primary | ICD-10-CM

## 2025-02-03 DIAGNOSIS — I10 ESSENTIAL HYPERTENSION: Chronic | ICD-10-CM

## 2025-02-03 PROCEDURE — 99421 OL DIG E/M SVC 5-10 MIN: CPT

## 2025-02-03 RX ORDER — BENZONATATE 100 MG/1
100 CAPSULE ORAL 3 TIMES DAILY PRN
Qty: 30 CAPSULE | Refills: 0 | Status: SHIPPED | OUTPATIENT
Start: 2025-02-03 | End: 2025-02-13

## 2025-02-03 RX ORDER — GUAIFENESIN 600 MG/1
600 TABLET, EXTENDED RELEASE ORAL 2 TIMES DAILY
Qty: 30 TABLET | Refills: 0 | Status: SHIPPED | OUTPATIENT
Start: 2025-02-03 | End: 2025-02-18

## 2025-02-03 ASSESSMENT — LIFESTYLE VARIABLES: SMOKING_STATUS: NO, I'VE NEVER SMOKED

## 2025-02-06 RX ORDER — DICYCLOMINE HYDROCHLORIDE 10 MG/1
10 CAPSULE ORAL DAILY
Qty: 90 CAPSULE | Refills: 1 | Status: SHIPPED | OUTPATIENT
Start: 2025-02-06 | End: 2025-08-05

## 2025-02-06 RX ORDER — SPIRONOLACTONE AND HYDROCHLOROTHIAZIDE 25; 25 MG/1; MG/1
1 TABLET ORAL DAILY
Qty: 90 TABLET | Refills: 1 | Status: SHIPPED | OUTPATIENT
Start: 2025-02-06 | End: 2025-08-05

## 2025-02-06 NOTE — TELEPHONE ENCOUNTER
Patient requesting to mail order pharmacy    LOV 1/10/25  RTO 6 months; F/U scheduled  LRF 1/24/25    Health Maintenance   Topic Date Due    Colorectal Cancer Screen  Never done    DEXA (modify frequency per FRAX score)  Never done    Breast cancer screen  06/12/2015    COVID-19 Vaccine (4 - 2024-25 season) 09/01/2024    Annual Wellness Visit (Medicare)  01/17/2025    Shingles vaccine (1 of 2) 07/17/2025 (Originally 9/19/2004)    Pneumococcal 50+ years Vaccine (1 of 1 - PCV) 07/17/2025 (Originally 9/19/2004)    Respiratory Syncytial Virus (RSV) Pregnant or age 60 yrs+ (1 - Risk 60-74 years 1-dose series) 07/17/2025 (Originally 9/19/2014)    Depression Screen  01/10/2026    Diabetes screen  10/24/2027    Lipids  10/24/2029    DTaP/Tdap/Td vaccine (2 - Td or Tdap) 11/12/2029    Flu vaccine  Completed    Hepatitis A vaccine  Aged Out    Hepatitis B vaccine  Aged Out    Hib vaccine  Aged Out    Polio vaccine  Aged Out    Meningococcal (ACWY) vaccine  Aged Out    Hepatitis C screen  Discontinued             (applicable per patient's age: Cancer Screenings, Depression Screening, Fall Risk Screening, Immunizations)    Hemoglobin A1C (%)   Date Value   08/22/2018 5.4     AST (U/L)   Date Value   10/24/2024 57 (H)     ALT (U/L)   Date Value   10/24/2024 35     BUN (mg/dL)   Date Value   10/24/2024 25 (H)      (goal A1C is < 7)   (goal LDL is <100) need 30-50% reduction from baseline     BP Readings from Last 3 Encounters:   01/24/25 136/78   01/10/25 (!) 140/90   07/17/24 118/80    (goal /80)      All Future Testing planned in [facility]:  Lab Frequency Next Occurrence   CBC Once 05/02/2025   Ferritin Once 05/02/2025       Next Visit Date:  Future Appointments   Date Time Provider Department Center   7/9/2025 11:00 AM Becky Sunshine, APRN - CNP [facility]            Patient Active Problem List:     Essential hypertension     Medication therapy management recommendation refused by patient     Mixed

## 2025-02-11 DIAGNOSIS — M54.42 ACUTE BILATERAL LOW BACK PAIN WITH BILATERAL SCIATICA: ICD-10-CM

## 2025-02-11 DIAGNOSIS — H69.91 EUSTACHIAN TUBE DYSFUNCTION, RIGHT: ICD-10-CM

## 2025-02-11 DIAGNOSIS — M54.41 ACUTE BILATERAL LOW BACK PAIN WITH BILATERAL SCIATICA: ICD-10-CM

## 2025-02-11 DIAGNOSIS — E87.6 HYPOKALEMIA: ICD-10-CM

## 2025-02-11 DIAGNOSIS — M48.061 NEURAL FORAMINAL STENOSIS OF LUMBAR SPINE: ICD-10-CM

## 2025-02-11 RX ORDER — FLUTICASONE PROPIONATE 50 MCG
1 SPRAY, SUSPENSION (ML) NASAL DAILY
Qty: 32 G | Refills: 1 | Status: SHIPPED | OUTPATIENT
Start: 2025-02-11

## 2025-02-11 RX ORDER — POTASSIUM CHLORIDE 750 MG/1
10 TABLET, EXTENDED RELEASE ORAL DAILY
Qty: 90 TABLET | Refills: 1 | Status: SHIPPED | OUTPATIENT
Start: 2025-02-11

## 2025-02-11 RX ORDER — CELECOXIB 100 MG/1
100 CAPSULE ORAL 2 TIMES DAILY
Qty: 180 CAPSULE | Refills: 1 | Status: SHIPPED | OUTPATIENT
Start: 2025-02-11

## 2025-02-11 NOTE — TELEPHONE ENCOUNTER
LOV 1/10/25   RTO 7/9/25  LRF 12/11/24, 7/17/24, 10/8/24, 9/13/24    Pt is changing to mail order pharmacy           Controlled Substance Monitoring:    Acute and Chronic Pain Monitoring:   RX Monitoring Periodic Controlled Substance Monitoring Chronic Pain > 50 MEDD   11/29/2022   7:24 AM Obtaining appropriate analgesic effect of treatment. Re-evaluated the status of the patient's underlying condition causing pain.

## 2025-02-23 DIAGNOSIS — E87.6 HYPOKALEMIA: ICD-10-CM

## 2025-02-23 DIAGNOSIS — M54.42 ACUTE BILATERAL LOW BACK PAIN WITH BILATERAL SCIATICA: ICD-10-CM

## 2025-02-23 DIAGNOSIS — M54.41 ACUTE BILATERAL LOW BACK PAIN WITH BILATERAL SCIATICA: ICD-10-CM

## 2025-02-27 RX ORDER — POTASSIUM CHLORIDE 750 MG/1
10 TABLET, EXTENDED RELEASE ORAL DAILY
Qty: 90 TABLET | Refills: 1 | OUTPATIENT
Start: 2025-02-27

## 2025-02-27 RX ORDER — DULOXETIN HYDROCHLORIDE 60 MG/1
60 CAPSULE, DELAYED RELEASE ORAL DAILY
Qty: 90 CAPSULE | Refills: 0 | Status: SHIPPED | OUTPATIENT
Start: 2025-02-27 | End: 2025-08-26

## 2025-02-27 NOTE — TELEPHONE ENCOUNTER
LOV 1/10/25  RTO 6 months; F/U scheduled  LRF 9/13/24    Health Maintenance   Topic Date Due    Colorectal Cancer Screen  Never done    DEXA (modify frequency per FRAX score)  Never done    Breast cancer screen  06/12/2015    COVID-19 Vaccine (4 - 2024-25 season) 09/01/2024    Annual Wellness Visit (Medicare)  01/17/2025    Shingles vaccine (1 of 2) 07/17/2025 (Originally 9/19/2004)    Pneumococcal 50+ years Vaccine (1 of 1 - PCV) 07/17/2025 (Originally 9/19/2004)    Respiratory Syncytial Virus (RSV) Pregnant or age 60 yrs+ (1 - Risk 60-74 years 1-dose series) 07/17/2025 (Originally 9/19/2014)    Depression Screen  01/10/2026    Diabetes screen  10/24/2027    Lipids  10/24/2029    DTaP/Tdap/Td vaccine (2 - Td or Tdap) 11/12/2029    Flu vaccine  Completed    Hepatitis A vaccine  Aged Out    Hepatitis B vaccine  Aged Out    Hib vaccine  Aged Out    Polio vaccine  Aged Out    Meningococcal (ACWY) vaccine  Aged Out    Hepatitis C screen  Discontinued             (applicable per patient's age: Cancer Screenings, Depression Screening, Fall Risk Screening, Immunizations)    Hemoglobin A1C (%)   Date Value   08/22/2018 5.4     AST (U/L)   Date Value   10/24/2024 57 (H)     ALT (U/L)   Date Value   10/24/2024 35     BUN (mg/dL)   Date Value   10/24/2024 25 (H)      (goal A1C is < 7)   (goal LDL is <100) need 30-50% reduction from baseline     BP Readings from Last 3 Encounters:   01/24/25 136/78   01/10/25 (!) 140/90   07/17/24 118/80    (goal /80)      All Future Testing planned in [facility]:  Lab Frequency Next Occurrence   CBC Once 05/02/2025   Ferritin Once 05/02/2025       Next Visit Date:  Future Appointments   Date Time Provider Department Center   7/9/2025 11:00 AM Becky Sunshine, APRN - CNP [facility]            Patient Active Problem List:     Essential hypertension     Medication therapy management recommendation refused by patient     Mixed hyperlipidemia     Spinal stenosis of lumbar region

## 2025-02-27 NOTE — TELEPHONE ENCOUNTER
LOV 1/10/25  RTO 6 months; F/U scheduled  LRF 2/11/25    Health Maintenance   Topic Date Due    Colorectal Cancer Screen  Never done    DEXA (modify frequency per FRAX score)  Never done    Breast cancer screen  06/12/2015    COVID-19 Vaccine (4 - 2024-25 season) 09/01/2024    Annual Wellness Visit (Medicare)  01/17/2025    Shingles vaccine (1 of 2) 07/17/2025 (Originally 9/19/2004)    Pneumococcal 50+ years Vaccine (1 of 1 - PCV) 07/17/2025 (Originally 9/19/2004)    Respiratory Syncytial Virus (RSV) Pregnant or age 60 yrs+ (1 - Risk 60-74 years 1-dose series) 07/17/2025 (Originally 9/19/2014)    Depression Screen  01/10/2026    Diabetes screen  10/24/2027    Lipids  10/24/2029    DTaP/Tdap/Td vaccine (2 - Td or Tdap) 11/12/2029    Flu vaccine  Completed    Hepatitis A vaccine  Aged Out    Hepatitis B vaccine  Aged Out    Hib vaccine  Aged Out    Polio vaccine  Aged Out    Meningococcal (ACWY) vaccine  Aged Out    Hepatitis C screen  Discontinued             (applicable per patient's age: Cancer Screenings, Depression Screening, Fall Risk Screening, Immunizations)    Hemoglobin A1C (%)   Date Value   08/22/2018 5.4     AST (U/L)   Date Value   10/24/2024 57 (H)     ALT (U/L)   Date Value   10/24/2024 35     BUN (mg/dL)   Date Value   10/24/2024 25 (H)      (goal A1C is < 7)   (goal LDL is <100) need 30-50% reduction from baseline     BP Readings from Last 3 Encounters:   01/24/25 136/78   01/10/25 (!) 140/90   07/17/24 118/80    (goal /80)      All Future Testing planned in [facility]:  Lab Frequency Next Occurrence   CBC Once 05/02/2025   Ferritin Once 05/02/2025       Next Visit Date:  Future Appointments   Date Time Provider Department Center   7/9/2025 11:00 AM Becky Sunshine, APRN - CNP [facility]            Patient Active Problem List:     Essential hypertension     Medication therapy management recommendation refused by patient     Mixed hyperlipidemia     Spinal stenosis of lumbar region

## 2025-04-15 DIAGNOSIS — M54.42 ACUTE BILATERAL LOW BACK PAIN WITH BILATERAL SCIATICA: ICD-10-CM

## 2025-04-15 DIAGNOSIS — M54.41 ACUTE BILATERAL LOW BACK PAIN WITH BILATERAL SCIATICA: ICD-10-CM

## 2025-04-16 NOTE — TELEPHONE ENCOUNTER
LOV 1/10/25  LRF 2/11/25  RTO    Health Maintenance   Topic Date Due    Colorectal Cancer Screen  Never done    DEXA (modify frequency per FRAX score)  Never done    Breast cancer screen  06/12/2015    COVID-19 Vaccine (4 - 2024-25 season) 09/01/2024    Annual Wellness Visit (Medicare)  01/17/2025    Shingles vaccine (1 of 2) 07/17/2025 (Originally 9/19/2004)    Pneumococcal 50+ years Vaccine (1 of 1 - PCV) 07/17/2025 (Originally 9/19/2004)    Respiratory Syncytial Virus (RSV) Pregnant or age 60 yrs+ (1 - Risk 60-74 years 1-dose series) 07/17/2025 (Originally 9/19/2014)    Depression Screen  01/10/2026    Diabetes screen  10/24/2027    Lipids  10/24/2029    DTaP/Tdap/Td vaccine (2 - Td or Tdap) 11/12/2029    Flu vaccine  Completed    Hepatitis A vaccine  Aged Out    Hepatitis B vaccine  Aged Out    Hib vaccine  Aged Out    Polio vaccine  Aged Out    Meningococcal (ACWY) vaccine  Aged Out    Meningococcal B vaccine  Aged Out    Hepatitis C screen  Discontinued             (applicable per patient's age: Cancer Screenings, Depression Screening, Fall Risk Screening, Immunizations)    Hemoglobin A1C (%)   Date Value   08/22/2018 5.4     AST (U/L)   Date Value   10/24/2024 57 (H)     ALT (U/L)   Date Value   10/24/2024 35     BUN (mg/dL)   Date Value   10/24/2024 25 (H)      (goal A1C is < 7)   (goal LDL is <100) need 30-50% reduction from baseline     BP Readings from Last 3 Encounters:   01/24/25 136/78   01/10/25 (!) 140/90   07/17/24 118/80    (goal /80)      All Future Testing planned in [facility]:  Lab Frequency Next Occurrence   CBC Once 05/02/2025   Ferritin Once 05/02/2025       Next Visit Date:  Future Appointments   Date Time Provider Department Center   7/11/2025 11:00 AM Becky Sunshine, APRN - CNP [facility]            Patient Active Problem List:     Essential hypertension     Medication therapy management recommendation refused by patient     Mixed hyperlipidemia     Spinal stenosis of

## 2025-04-25 DIAGNOSIS — E87.6 HYPOKALEMIA: ICD-10-CM

## 2025-04-25 RX ORDER — POTASSIUM CHLORIDE 750 MG/1
10 TABLET, EXTENDED RELEASE ORAL DAILY
Qty: 90 TABLET | Refills: 1 | Status: CANCELLED | OUTPATIENT
Start: 2025-04-25

## 2025-06-04 DIAGNOSIS — M54.41 ACUTE BILATERAL LOW BACK PAIN WITH BILATERAL SCIATICA: ICD-10-CM

## 2025-06-04 DIAGNOSIS — M54.42 ACUTE BILATERAL LOW BACK PAIN WITH BILATERAL SCIATICA: ICD-10-CM

## 2025-06-05 RX ORDER — DULOXETIN HYDROCHLORIDE 60 MG/1
60 CAPSULE, DELAYED RELEASE ORAL DAILY
Qty: 90 CAPSULE | Refills: 1 | Status: SHIPPED | OUTPATIENT
Start: 2025-06-05

## 2025-06-05 NOTE — TELEPHONE ENCOUNTER
LOV 01/10/2025   RTO 07/11/2025  LRF 02/27/2025          Controlled Substance Monitoring:    Acute and Chronic Pain Monitoring:   RX Monitoring Periodic Controlled Substance Monitoring Chronic Pain > 50 MEDD   11/29/2022   7:24 AM Obtaining appropriate analgesic effect of treatment. Re-evaluated the status of the patient's underlying condition causing pain.

## 2025-07-02 ENCOUNTER — HOSPITAL ENCOUNTER (OUTPATIENT)
Age: 70
Setting detail: SPECIMEN
Discharge: HOME | End: 2025-07-02

## 2025-07-02 DIAGNOSIS — D58.2 ELEVATED HEMOGLOBIN: ICD-10-CM

## 2025-07-02 DIAGNOSIS — R79.89 ELEVATED FERRITIN: ICD-10-CM

## 2025-07-02 LAB
ERYTHROCYTE [DISTWIDTH] IN BLOOD BY AUTOMATED COUNT: 13.4 % (ref 11.8–14.4)
FERRITIN SERPL-MCNC: 324 NG/ML
HCT VFR BLD AUTO: 47.7 % (ref 36.3–47.1)
HGB BLD-MCNC: 15.3 G/DL (ref 11.9–15.1)
MCH RBC QN AUTO: 29.7 PG (ref 25.2–33.5)
MCHC RBC AUTO-ENTMCNC: 32.1 G/DL (ref 28.4–34.8)
MCV RBC AUTO: 92.4 FL (ref 82.6–102.9)
NRBC BLD-RTO: 0 PER 100 WBC
PLATELET # BLD AUTO: ABNORMAL K/UL (ref 138–453)
PLATELET, FLUORESCENCE: 333 K/UL (ref 138–453)
PLATELETS.RETICULATED NFR BLD AUTO: 1.8 % (ref 1.1–10.3)
RBC # BLD AUTO: 5.16 M/UL (ref 3.95–5.11)
WBC OTHER # BLD: 8.2 K/UL (ref 3.5–11.3)

## 2025-07-03 ENCOUNTER — RESULTS FOLLOW-UP (OUTPATIENT)
Dept: FAMILY MEDICINE CLINIC | Age: 70
End: 2025-07-03

## 2025-07-03 DIAGNOSIS — E87.6 HYPOKALEMIA: ICD-10-CM

## 2025-07-03 NOTE — TELEPHONE ENCOUNTER
LOV 2/3/25   RTO 7/11/25  LRF 2/11/25          Controlled Substance Monitoring:    Acute and Chronic Pain Monitoring:   RX Monitoring Periodic Controlled Substance Monitoring Chronic Pain > 50 MEDD   11/29/2022   7:24 AM Obtaining appropriate analgesic effect of treatment. Re-evaluated the status of the patient's underlying condition causing pain.

## 2025-07-07 RX ORDER — POTASSIUM CHLORIDE 750 MG/1
10 TABLET, EXTENDED RELEASE ORAL DAILY
Qty: 90 TABLET | Refills: 1 | Status: SHIPPED | OUTPATIENT
Start: 2025-07-07

## 2025-07-22 DIAGNOSIS — M48.061 NEURAL FORAMINAL STENOSIS OF LUMBAR SPINE: ICD-10-CM

## 2025-07-23 RX ORDER — CELECOXIB 100 MG/1
100 CAPSULE ORAL 2 TIMES DAILY
Qty: 180 CAPSULE | Refills: 3 | Status: SHIPPED | OUTPATIENT
Start: 2025-07-23

## 2025-07-23 NOTE — TELEPHONE ENCOUNTER
Annmarie Riggins is calling to request a refill on the following medication(s):    Last Visit Date (If Applicable):  2/3/2025    Next Visit Date:    8/1/2025    Medication Request:  Requested Prescriptions     Pending Prescriptions Disp Refills    celecoxib (CELEBREX) 100 MG capsule [Pharmacy Med Name: CELECOXIB CAPS 100MG] 180 capsule 3     Sig: TAKE 1 CAPSULE TWICE A DAY

## 2025-08-01 ENCOUNTER — OFFICE VISIT (OUTPATIENT)
Dept: FAMILY MEDICINE CLINIC | Age: 70
End: 2025-08-01
Payer: MEDICARE

## 2025-08-01 VITALS
SYSTOLIC BLOOD PRESSURE: 126 MMHG | OXYGEN SATURATION: 96 % | HEIGHT: 67 IN | WEIGHT: 266 LBS | HEART RATE: 88 BPM | BODY MASS INDEX: 41.75 KG/M2 | TEMPERATURE: 97.5 F | DIASTOLIC BLOOD PRESSURE: 84 MMHG

## 2025-08-01 DIAGNOSIS — Z13.1 DIABETES MELLITUS SCREENING: ICD-10-CM

## 2025-08-01 DIAGNOSIS — M79.7 FIBROMYALGIA: ICD-10-CM

## 2025-08-01 DIAGNOSIS — E55.9 VITAMIN D DEFICIENCY: ICD-10-CM

## 2025-08-01 DIAGNOSIS — M25.50 ARTHRALGIA, UNSPECIFIED JOINT: ICD-10-CM

## 2025-08-01 DIAGNOSIS — I10 ESSENTIAL HYPERTENSION: ICD-10-CM

## 2025-08-01 DIAGNOSIS — M48.061 SPINAL STENOSIS OF LUMBAR REGION WITHOUT NEUROGENIC CLAUDICATION: ICD-10-CM

## 2025-08-01 DIAGNOSIS — M19.011 OSTEOARTHRITIS OF RIGHT GLENOHUMERAL JOINT: ICD-10-CM

## 2025-08-01 DIAGNOSIS — Z13.6 SCREENING FOR CARDIOVASCULAR CONDITION: ICD-10-CM

## 2025-08-01 DIAGNOSIS — Z00.00 MEDICARE ANNUAL WELLNESS VISIT, SUBSEQUENT: Primary | ICD-10-CM

## 2025-08-01 DIAGNOSIS — Z98.890 STATUS POST CARPAL TUNNEL RELEASE: ICD-10-CM

## 2025-08-01 DIAGNOSIS — E87.6 HYPOKALEMIA: ICD-10-CM

## 2025-08-01 PROCEDURE — 3074F SYST BP LT 130 MM HG: CPT

## 2025-08-01 PROCEDURE — G8417 CALC BMI ABV UP PARAM F/U: HCPCS

## 2025-08-01 PROCEDURE — 99214 OFFICE O/P EST MOD 30 MIN: CPT

## 2025-08-01 PROCEDURE — G8400 PT W/DXA NO RESULTS DOC: HCPCS

## 2025-08-01 PROCEDURE — 1123F ACP DISCUSS/DSCN MKR DOCD: CPT

## 2025-08-01 PROCEDURE — 1159F MED LIST DOCD IN RCRD: CPT

## 2025-08-01 PROCEDURE — G2211 COMPLEX E/M VISIT ADD ON: HCPCS

## 2025-08-01 PROCEDURE — 1160F RVW MEDS BY RX/DR IN RCRD: CPT

## 2025-08-01 PROCEDURE — 1090F PRES/ABSN URINE INCON ASSESS: CPT

## 2025-08-01 PROCEDURE — 1036F TOBACCO NON-USER: CPT

## 2025-08-01 PROCEDURE — 3079F DIAST BP 80-89 MM HG: CPT

## 2025-08-01 PROCEDURE — G0439 PPPS, SUBSEQ VISIT: HCPCS

## 2025-08-01 PROCEDURE — G8427 DOCREV CUR MEDS BY ELIG CLIN: HCPCS

## 2025-08-01 PROCEDURE — 3017F COLORECTAL CA SCREEN DOC REV: CPT

## 2025-08-01 RX ORDER — POTASSIUM CHLORIDE 750 MG/1
10 TABLET, EXTENDED RELEASE ORAL DAILY
Qty: 90 TABLET | Refills: 1 | Status: SHIPPED | OUTPATIENT
Start: 2025-08-01

## 2025-08-01 RX ORDER — AMITRIPTYLINE HYDROCHLORIDE 10 MG/1
10 TABLET ORAL NIGHTLY
Qty: 30 TABLET | Refills: 1 | Status: SHIPPED | OUTPATIENT
Start: 2025-08-01

## 2025-08-01 ASSESSMENT — PATIENT HEALTH QUESTIONNAIRE - PHQ9
SUM OF ALL RESPONSES TO PHQ QUESTIONS 1-9: 0
1. LITTLE INTEREST OR PLEASURE IN DOING THINGS: NOT AT ALL
2. FEELING DOWN, DEPRESSED OR HOPELESS: NOT AT ALL
SUM OF ALL RESPONSES TO PHQ QUESTIONS 1-9: 0

## 2025-08-01 NOTE — PROGRESS NOTES
Medicare Annual Wellness Visit    Annmarie Riggins is here for Medicare AWV and Hypertension    Assessment & Plan   Medicare annual wellness visit, subsequent  Essential hypertension  -     CBC; Future  -     Comprehensive Metabolic Panel, Fasting; Future  -     Lipid Panel; Future  -     TSH reflex to FT4; Future  -     Hemoglobin A1C; Future  -     Vitamin D 25 Hydroxy; Future  Status post carpal tunnel release  Fibromyalgia  -     amitriptyline (ELAVIL) 10 MG tablet; Take 1 tablet by mouth nightly, Disp-30 tablet, R-1Normal  Arthralgia, unspecified joint  Osteoarthritis of right glenohumeral joint  Spinal stenosis of lumbar region without neurogenic claudication  Hypokalemia  -     potassium chloride (KLOR-CON M) 10 MEQ extended release tablet; Take 1 tablet by mouth daily, Disp-90 tablet, R-1Normal  Vitamin D deficiency  -     Vitamin D 25 Hydroxy; Future  Screening for cardiovascular condition  -     Lipid Panel; Future  Diabetes mellitus screening  -     Hemoglobin A1C; Future    Continue current antihypertensives   Add amitriptyline nightly to see if this helps with ongoing pains  Potassium refilled per patient request  Discussed the addition of a daily antihistamine for nasal/allergy symptoms  Discussed GLP-1's however patient is not interested at this time due to side effect profile  Continue follow up with specialists   Obtain annual labs in October     Return in 3 months (on 11/1/2025), or if symptoms worsen or fail to improve, for follow up (3-6 months).       Subjective     Patient presents today for Medicare annual wellness visit and chronic condition follow-up.    Hypertension on Aldactazide, blood pressure today is 126/84 with heart rate of 88.    Had a right carpal tunnel release on 5/27, tells me symptoms have significantly improved since. Last visit with orthopedics was in June.     Tells me she is tired and her entire body hurts, notes she was diagnosed with fibromyalgia in her 30s. Had had

## 2025-09-02 ENCOUNTER — PATIENT MESSAGE (OUTPATIENT)
Dept: FAMILY MEDICINE CLINIC | Age: 70
End: 2025-09-02

## 2025-09-02 DIAGNOSIS — I10 ESSENTIAL HYPERTENSION: Chronic | ICD-10-CM

## 2025-09-02 DIAGNOSIS — H43.399 VITREOUS FLOATERS, UNSPECIFIED LATERALITY: Primary | ICD-10-CM

## 2025-09-03 RX ORDER — DICYCLOMINE HYDROCHLORIDE 10 MG/1
10 CAPSULE ORAL DAILY
Qty: 90 CAPSULE | Refills: 1 | Status: SHIPPED | OUTPATIENT
Start: 2025-09-03

## 2025-09-03 RX ORDER — SPIRONOLACTONE AND HYDROCHLOROTHIAZIDE 25; 25 MG/1; MG/1
1 TABLET ORAL DAILY
Qty: 90 TABLET | Refills: 3 | Status: SHIPPED | OUTPATIENT
Start: 2025-09-03

## (undated) DEVICE — BUR SURG RND MIC 4X4 MM 54.5 MM 8 FLUT J NOTCH SS

## (undated) DEVICE — SHEET, ORTHO, SPLIT, STERILE: Brand: MEDLINE

## (undated) DEVICE — HANDPIECE SET WITH BONE CLEANING TIP AND SUCTION TUBE: Brand: INTERPULSE

## (undated) DEVICE — COVER,TABLE,HEAVY DUTY,50"X90",STRL: Brand: MEDLINE

## (undated) DEVICE — TUBING SUCT 12FR MAL ALUM SHFT FN CAP VENT UNIV CONN W/ OBT

## (undated) DEVICE — BANDAGE COBAN 6 IN WND 6INX5YD FOAM

## (undated) DEVICE — COVER,TABLE,60X90,STERILE: Brand: MEDLINE

## (undated) DEVICE — DUAL CUT SAGITTAL BLADE

## (undated) DEVICE — SUTURE FIBERWIRE SZ 2 L38IN NONABSORBABLE BLU L36.6MM 1/2 AR7202

## (undated) DEVICE — SUTURE VCRL SZ 0 L36IN ABSRB UD CT-1 L36MM 1/2 CIR TAPR PNT VCP946H

## (undated) DEVICE — Device

## (undated) DEVICE — TOWEL,OR,DSP,ST,BLUE,STD,6/PK,12PK/CS: Brand: MEDLINE

## (undated) DEVICE — 4-PORT MANIFOLD: Brand: NEPTUNE 2

## (undated) DEVICE — YANKAUER, OPEN TIP, W/O VENT, STERILE: Brand: MEDLINE

## (undated) DEVICE — GLOVE ORANGE PI 7   MSG9070

## (undated) DEVICE — CEMENT MIXING SYSTEM WITH FEMORAL BREAKWAY NOZZLE: Brand: REVOLUTION

## (undated) DEVICE — INTENDED FOR TISSUE SEPARATION, AND OTHER PROCEDURES THAT REQUIRE A SHARP SURGICAL BLADE TO PUNCTURE OR CUT.: Brand: BARD-PARKER ® CARBON RIB-BACK BLADES

## (undated) DEVICE — SOLUTION IRRIG 3000ML 0.9% SOD CHL USP UROMATIC PLAS CONT

## (undated) DEVICE — SUTURE PROL SZ 3-0 L18IN NONABSORBABLE BLU L24MM FS-1 3/8 8684G

## (undated) DEVICE — YANKAUER,OPEN TIP,W/O VENT,STERILE: Brand: MEDLINE INDUSTRIES, INC.

## (undated) DEVICE — TUBING, SUCTION, 9/32" X 12', STRAIGHT: Brand: MEDLINE INDUSTRIES, INC.

## (undated) DEVICE — NEPTUNE E-SEP SMOKE EVACUATION PENCIL, COATED, 70MM BLADE, PUSH BUTTON SWITCH: Brand: NEPTUNE E-SEP

## (undated) DEVICE — SUTURE ETHBND EXCEL SZ 1 L30IN NONABSORBABLE GRN L36MM CT-1 X425H

## (undated) DEVICE — KIT POS ULT SHLDR PT CARE REHAB SLNG

## (undated) DEVICE — GOWN,SIRUS,NONRNF,SETINSLV,XL,20/CS: Brand: MEDLINE

## (undated) DEVICE — SPONGE LAP W18XL18IN WHT COT 4 PLY FLD STRUNG RADPQ DISP ST

## (undated) DEVICE — SOLUTION IRRIG 1000ML STRL H2O USP PLAS POUR BTL

## (undated) DEVICE — SOLUTION IRRIG 1000ML 0.9% SOD CHL USP POUR PLAS BTL

## (undated) DEVICE — GLOVE ORANGE PI 7 1/2   MSG9075

## (undated) DEVICE — MERCY HEALTH ST CHARLES: Brand: MEDLINE INDUSTRIES, INC.

## (undated) DEVICE — DRESSING TRNSPAR W5XL4.5IN FLM SHT SEMIPERMEABLE WIND

## (undated) DEVICE — SUTURE VCRL + SZ 3-0 L36IN ABSRB UD L36MM CT-1 1/2 CIR VCP944H

## (undated) DEVICE — STRIP,CLOSURE,WOUND,MEDI-STRIP,1/2X4: Brand: MEDLINE

## (undated) DEVICE — GOWN,AURORA,NONREINFORCED,LARGE: Brand: MEDLINE

## (undated) DEVICE — ETHIBOND EXCEL SUT 30 INCHES75CM 2 GRN

## (undated) DEVICE — ELECTRODE ES L3IN S STL BLDE INSUL DISP VALLEYLAB EDGE

## (undated) DEVICE — BLANKET WRM W40.2XL55.9IN IORT LO BODY + MISTRAL AIR

## (undated) DEVICE — 450 ML BOTTLE OF 0.05% CHLORHEXIDINE GLUCONATE IN 99.95% STERILE WATER FOR IRRIGATION, USP AND APPLICATOR.: Brand: IRRISEPT ANTIMICROBIAL WOUND LAVAGE

## (undated) DEVICE — 3M™ IOBAN™ 2 ANTIMICROBIAL INCISE DRAPE 6651EZ: Brand: IOBAN™ 2

## (undated) DEVICE — DRAPE,EXTREMITY,89X128,STERILE: Brand: MEDLINE